# Patient Record
Sex: FEMALE | Race: BLACK OR AFRICAN AMERICAN | Employment: UNEMPLOYED | RURAL
[De-identification: names, ages, dates, MRNs, and addresses within clinical notes are randomized per-mention and may not be internally consistent; named-entity substitution may affect disease eponyms.]

---

## 2017-01-25 RX ORDER — ROPINIROLE 0.5 MG/1
0.5 TABLET, FILM COATED ORAL 2 TIMES DAILY
Qty: 60 TAB | Refills: 0 | Status: CANCELLED | OUTPATIENT
Start: 2017-01-25

## 2017-01-26 RX ORDER — ROPINIROLE 0.5 MG/1
0.5 TABLET, FILM COATED ORAL
Qty: 90 TAB | Status: CANCELLED | OUTPATIENT
Start: 2017-01-26

## 2017-02-03 RX ORDER — DICLOFENAC SODIUM 75 MG/1
TABLET, DELAYED RELEASE ORAL
Qty: 60 TAB | Refills: 0 | Status: SHIPPED | OUTPATIENT
Start: 2017-02-03 | End: 2017-09-14 | Stop reason: SDUPTHER

## 2017-04-25 ENCOUNTER — OFFICE VISIT (OUTPATIENT)
Dept: FAMILY MEDICINE CLINIC | Age: 72
End: 2017-04-25

## 2017-04-25 VITALS
RESPIRATION RATE: 16 BRPM | OXYGEN SATURATION: 97 % | DIASTOLIC BLOOD PRESSURE: 85 MMHG | SYSTOLIC BLOOD PRESSURE: 189 MMHG | BODY MASS INDEX: 32.76 KG/M2 | HEIGHT: 62 IN | WEIGHT: 178 LBS | TEMPERATURE: 97.3 F | HEART RATE: 75 BPM

## 2017-04-25 DIAGNOSIS — Z12.39 BREAST CANCER SCREENING: ICD-10-CM

## 2017-04-25 DIAGNOSIS — E55.9 VITAMIN D DEFICIENCY: ICD-10-CM

## 2017-04-25 DIAGNOSIS — F20.3 UNDIFFERENTIATED SCHIZOPHRENIA (HCC): ICD-10-CM

## 2017-04-25 DIAGNOSIS — I10 ESSENTIAL HYPERTENSION: Primary | ICD-10-CM

## 2017-04-25 DIAGNOSIS — E11.9 DIABETES MELLITUS TYPE 2, DIET-CONTROLLED (HCC): ICD-10-CM

## 2017-04-25 RX ORDER — ACETAMINOPHEN 500 MG
TABLET ORAL
Qty: 90 CAP | Refills: 1 | Status: SHIPPED | OUTPATIENT
Start: 2017-04-25 | End: 2017-05-20 | Stop reason: SDUPTHER

## 2017-04-25 RX ORDER — HYDROCHLOROTHIAZIDE 12.5 MG/1
12.5 TABLET ORAL DAILY
Qty: 90 TAB | Refills: 1 | Status: SHIPPED | OUTPATIENT
Start: 2017-04-25 | End: 2017-05-11 | Stop reason: SDUPTHER

## 2017-04-25 RX ORDER — LISINOPRIL 20 MG/1
TABLET ORAL
Qty: 90 TAB | Refills: 2 | Status: SHIPPED | OUTPATIENT
Start: 2017-04-25 | End: 2017-10-02 | Stop reason: SDUPTHER

## 2017-04-25 NOTE — PROGRESS NOTES
Debi Raya is an 70 y.o. female who presents with chief concern of HTN. HTN:  No check at home. Has been severely elevated at [facility] visits and elevated during visits over the last year since I saw her in June 2016. BP Readings from Last 3 Encounters:   04/25/17 189/85   10/13/16 161/77   08/18/16 184/85   Today, she states she hasn't taken her Lisinopril for 1 week because it was \"making her cold\". This cold and hot sensation has been going on for \"over 3 years\". Not clear what made her think it was Lisinopril. She is not smoking. Not drinking etOH. Eats well rounded diet and avoids fried foods. Denies HA, Vision changes, CP, Palp, SOB, ANDERSON, Edema, dizziness. Schizophrenia:  She goes for her injection next week. Still hearing voices, but no harmful thoughts of hurting self or others. No delusions, no harmful actions. Detailed  Review of Systems, positives are bolded, strike through if denied. GEN:    CV:      Pulm:    Abd/GI:   Psych:    Neuro:     ENT:      Endocrine:   /Temperature intolerance/  :      MSK:      Skin:    Lower Ext:           Current and past medical information:  I personally reviewed and included updated list below.     Past Medical History:   Diagnosis Date    Hypertension     Psychotic disorder        Allergies   Allergen Reactions    Bactrim [Sulfamethoxazole-Trimethoprim] Nausea Only       Past Surgical History:   Procedure Laterality Date    HX GYN         Social History     Social History    Marital status:      Spouse name: N/A    Number of children: N/A    Years of education: N/A     Social History Main Topics    Smoking status: Former Smoker    Smokeless tobacco: Never Used    Alcohol use No    Drug use: No    Sexual activity: Not Asked     Other Topics Concern    None     Social History Narrative           Physical Exam, Abnormal/pertinent findings bolded,     Visit Vitals    /85 (BP 1 Location: Left arm, BP Patient Position: Sitting)    Pulse 75    Temp 97.3 °F (36.3 °C) (Oral)    Resp 16    Ht 5' 2\" (1.575 m)    Wt 178 lb (80.7 kg)    SpO2 97%    BMI 32.56 kg/m2          GEN:    Alert and Oriented, No acute distress  Psych:  Mood appropriate, Strange affect but very pleasant, No pressured speech, linear thoughts  CV:    Regular Rhythm, S1 and S2 audible, no MRG, no palpable thrills  Pulm:    CTA B/L, no wheezes/rubs  GI/Abd:   Non-tender to palpation, normal bowel sounds x4, no masses  Neuro:   Lucid, No focal deficits  ENT:    EOMI, Non-icteric sclera, MMM  Neck:   Trachea midline, no lymphadenopathy  :    No suprapubic tenderness  MSK:  Normal gait, FROM in all four extremities  Skin:   No visible Rash, Ecchymosis, or Excoriations  Lower Ext: No edema, No tenderness to palpation, No palpable cords        Assessment/PLAN    1. Essential hypertension  - Her last few visits her BP has been severely elevated and with her Diet controlled diabetes would still recommend goal <140/90. She has not been taking the Lisinopril which I started in June 2016 for the last week but that does not account for the last year of elevated BP. Will start a low dose second agent (HCTZ) and titrate both. She is not having any cardiopulmonary symptoms.   - Advised follow up in 1 month for repeat BP.   - METABOLIC PANEL, COMPREHENSIVE  - CBC WITH AUTOMATED DIFF  - LIPID PANEL  - HEMOGLOBIN A1C WITH EAG  - MICROALBUMIN, UR, RAND W/ MICROALBUMIN/CREA RATIO  - TSH 3RD GENERATION  - hydroCHLOROthiazide (HYDRODIURIL) 12.5 mg tablet; Take 1 Tab by mouth daily. Dispense: 90 Tab; Refill: 1    2. Diabetes mellitus type 2, diet-controlled ([address]  - Diet controlled. She denies any symptoms of lows. Continue to monitor lab work and discuss vision screening at next visit.    - METABOLIC PANEL, COMPREHENSIVE  - CBC WITH AUTOMATED DIFF  - LIPID PANEL  - HEMOGLOBIN A1C WITH EAG  - MICROALBUMIN, UR, RAND W/ MICROALBUMIN/CREA RATIO  - TSH 3RD GENERATION    3. Undifferentiated schizophrenia ([facility]  - Stable, has follow up next week with [facility].   - METABOLIC PANEL, COMPREHENSIVE  - CBC WITH AUTOMATED DIFF  - LIPID PANEL  - HEMOGLOBIN A1C WITH EAG  - MICROALBUMIN, UR, RAND W/ MICROALBUMIN/CREA RATIO  - TSH 3RD GENERATION    4. Vitamin D deficiency  Lab Results   Component Value Date/Time    VITAMIN D, 25-HYDROXY 7.7 06/20/2016 10:00 AM       Severely low level last year. We attempted to give the 50,000 units weekly but she had a difficult time remembering and level was not rechecked. Will start just a daily supplemental and reassess today as a baseline.   - VITAMIN D, 25 HYDROXY  - Cholecalciferol, Vitamin D3, (VITAMIN D3) 2,000 unit cap capsule; Take 1 tablet daily. Dispense: 90 Cap; Refill: 1    5. Breast cancer screening  - Due for cancer screening.   - DYLAN MAMMO BI SCREENING INCL CAD; Future    Vit D, BP check in 1 month,    Follow-up Disposition:  Return in about 1 month (around 5/25/2017). For next visit follow up [facility], Lab eval, vitamin D, BP check in 1 month. Plan of care:  Discussed diagnoses in detail with patient. Medication risks/benefits/side effects discussed with patient. All of the patient's questions were addressed. The patient understands and agrees with our plan of care. The patient knows to call back if they are unsure of or forget any changes we discussed today or if the symptoms change. The patient received an After-Visit Summary which contains VS, orders, medication list and allergy list. This can be used as a \"mini-medical record\" should they have to seek medical care while out of town.       Maria Eugenia Hoffman DO  Resident note, PGY-3  Patient discussed with [address] [place] [address] Physician, Dr. Dave Suarez  Date Time Provider [place] Jackson   5/23/2017 1:20 PM Maria Eugenia Hoffman DO [facility] SCHED           Current Medications after this visit[de-identified]       Current Outpatient Prescriptions Medication Sig    hydroCHLOROthiazide (HYDRODIURIL) 12.5 mg tablet Take 1 Tab by mouth daily.  Cholecalciferol, Vitamin D3, (VITAMIN D3) 2,000 unit cap capsule Take 1 tablet daily.  lisinopril (PRINIVIL, ZESTRIL) 20 mg tablet TAKE ONE TABLET BY MOUTH EVERY DAY    diclofenac EC (VOLTAREN) 75 mg EC tablet TAKE ONE TABLET BY MOUTH TWICE DAILY AS NEEDED    lurasidone (LATUDA) 80 mg tab tablet 40 mg.    fluPHENAZine (PROLIXIN) 5 mg tablet     fluPHENAZine decanoate (PROLIXIN) 25 mg/mL injection     benztropine (COGENTIN) 0.5 mg tablet      No current facility-administered medications for this visit.

## 2017-04-25 NOTE — MR AVS SNAPSHOT
Visit Information Date & Time Provider Department Dept. Phone Encounter #  
 4/25/2017  8:50 AM Jacqui Durán, [place] [address] [id number] Follow-up Instructions Return in about 3 months (around 7/25/2017). Upcoming Health Maintenance Date Due DTaP/Tdap/Td series (1 - Tdap) 7/7/1966 GLAUCOMA SCREENING Q2Y 7/7/2010 Pneumococcal 65+ Low/Medium Risk (2 of 2 - PCV13) 10/22/2014 EYE EXAM RETINAL OR DILATED Q1 11/12/2014 FOBT Q 1 YEAR AGE 50-75 2/24/2016 MEDICARE YEARLY EXAM 3/18/2016 BREAST CANCER SCRN MAMMOGRAM 4/14/2016 HEMOGLOBIN A1C Q6M 12/20/2016 FOOT EXAM Q1 6/20/2017 MICROALBUMIN Q1 6/20/2017 LIPID PANEL Q1 6/20/2017 Allergies as of 4/25/2017  Review Complete On: 4/25/2017 By: Ricardo Lloyd LPN Severity Noted Reaction Type Reactions Bactrim [Sulfamethoxazole-trimethoprim]  06/27/2013    Nausea Only Current Immunizations  Reviewed on 10/13/2016 Name Date Influenza Vaccine (Quad) PF 10/13/2016, 2/9/2016, 9/25/2014 Influenza Vaccine Split 10/16/2012 Pneumococcal Polysaccharide (PPSV-23) 10/22/2013 Not reviewed this visit You Were Diagnosed With   
  
 Codes Comments Essential hypertension    -  Primary ICD-10-CM: I10 
ICD-9-CM: 401.9 Diabetes mellitus type 2, diet-controlled ([facility]     ICD-10-CM: E11.9 ICD-9-CM: 250.00 Undifferentiated schizophrenia ([facility]     ICD-10-CM: F20.3 ICD-9-CM: 295.90 Vitamin D deficiency     ICD-10-CM: E55.9 ICD-9-CM: 268.9 Breast cancer screening     ICD-10-CM: Z12.39 
ICD-9-CM: V76.10 Vitals BP Pulse Temp Resp Height(growth percentile) Weight(growth percentile) 192/83 (BP 1 Location: Left arm, BP Patient Position: Sitting) 75 97.3 °F (36.3 °C) (Oral) 16 5' 2\" (1.575 m) 178 lb (80.7 kg) SpO2 BMI OB Status Smoking Status 97% 32.56 kg/m2 Postmenopausal Former Smoker Vitals History BMI and BSA Data Body Mass Index Body Surface Area 32.56 kg/m 2 1.88 m 2 Preferred Pharmacy Pharmacy Name Phone [address] [facility] [address] [place] [address] [facility] [phone] Your Updated Medication List  
  
   
This list is accurate as of: 4/25/17  9:36 AM.  Always use your most recent med list.  
  
  
  
  
 benztropine 0.5 mg tablet Commonly known as:  COGENTIN Cholecalciferol (Vitamin D3) 2,000 unit Cap capsule Commonly known as:  VITAMIN D3 Take 1 tablet daily. diclofenac EC 75 mg EC tablet Commonly known as:  VOLTAREN  
TAKE ONE TABLET BY MOUTH TWICE DAILY AS NEEDED  
  
 fluPHENAZine 5 mg tablet Commonly known as:  PROLIXIN  
  
 fluPHENAZine decanoate 25 mg/mL injection Commonly known as:  PROLIXIN  
  
 hydroCHLOROthiazide 12.5 mg tablet Commonly known as:  HYDRODIURIL Take 1 Tab by mouth daily. lisinopril 20 mg tablet Commonly known as:  PRINIVIL, ZESTRIL  
TAKE ONE TABLET BY MOUTH EVERY DAY  
  
 lurasidone 80 mg Tab tablet Commonly known as:  LATUDA  
40 mg.  
  
  
  
  
Prescriptions Sent to Pharmacy Refills  
 hydroCHLOROthiazide (HYDRODIURIL) 12.5 mg tablet 1 Sig: Take 1 Tab by mouth daily. Class: Normal  
 Pharmacy: [address] [place] [address] Ph #: [phone] Route: Oral  
 Cholecalciferol, Vitamin D3, (VITAMIN D3) 2,000 unit cap capsule 1 Sig: Take 1 tablet daily. Class: Normal  
 Pharmacy: [address] [place] [address] Ph #: [phone] We Performed the Following CBC WITH AUTOMATED DIFF [78259 CPT(R)] HEMOGLOBIN A1C WITH EAG [90518 CPT(R)] LIPID PANEL [82976 CPT(R)] METABOLIC PANEL, COMPREHENSIVE [29780 CPT(R)] MICROALBUMIN, UR, RAND W/ MICROALBUMIN/CREA RATIO [id number] CPT(R)] TSH 3RD GENERATION [86033 CPT(R)] VITAMIN D, 25 HYDROXY [id number] CPT(R)] Follow-up Instructions Return in about 3 months (around 7/25/2017). To-Do List   
 04/25/2017 Imaging:  DYLAN MAMMO BI SCREENING INCL CAD Patient Instructions DASH Diet: Care Instructions Your Care Instructions The DASH diet is an eating plan that can help lower your blood pressure. DASH stands for Dietary Approaches to Stop Hypertension. Hypertension is high blood pressure. The DASH diet focuses on eating foods that are high in calcium, potassium, and magnesium. These nutrients can lower blood pressure. The foods that are highest in these nutrients are fruits, vegetables, low-fat dairy products, nuts, seeds, and legumes. But taking calcium, potassium, and magnesium supplements instead of eating foods that are high in those nutrients does not have the same effect. The DASH diet also includes whole grains, fish, and poultry. The DASH diet is one of several lifestyle changes your doctor may recommend to lower your high blood pressure. Your doctor may also want you to decrease the amount of sodium in your diet. Lowering sodium while following the DASH diet can lower blood pressure even further than just the DASH diet alone. Follow-up care is a key part of your treatment and safety. Be sure to make and go to all appointments, and call your doctor if you are having problems. It's also a good idea to know your test results and keep a list of the medicines you take. How can you care for yourself at home? Following the DASH diet · Eat 4 to 5 servings of fruit each day. A serving is 1 medium-sized piece of fruit, ½ cup chopped or canned fruit, 1/4 cup dried fruit, or 4 ounces (½ cup) of fruit juice. Choose fruit more often than fruit juice. · Eat 4 to 5 servings of vegetables each day. A serving is 1 cup of lettuce or raw leafy vegetables, ½ cup of chopped or cooked vegetables, or 4 ounces (½ cup) of vegetable juice. Choose vegetables more often than vegetable juice. · Get 2 to 3 servings of low-fat and fat-free dairy each day. A serving is 8 ounces of milk, 1 cup of yogurt, or 1 ½ ounces of cheese. · Eat 6 to 8 servings of grains each day. A serving is 1 slice of bread, 1 ounce of dry cereal, or ½ cup of cooked rice, pasta, or cooked cereal. Try to choose whole-grain products as much as possible. · Limit lean meat, poultry, and fish to 2 servings each day. A serving is 3 ounces, about the size of a deck of cards. · Eat 4 to 5 servings of nuts, seeds, and legumes (cooked dried beans, lentils, and split peas) each week. A serving is 1/3 cup of nuts, 2 tablespoons of seeds, or ½ cup of cooked beans or peas. · Limit fats and oils to 2 to 3 servings each day. A serving is 1 teaspoon of vegetable oil or 2 tablespoons of salad dressing. · Limit sweets and added sugars to 5 servings or less a week. A serving is 1 tablespoon jelly or jam, ½ cup sorbet, or 1 cup of lemonade. · Eat less than 2,300 milligrams (mg) of sodium a day. If you limit your sodium to 1,500 mg a day, you can lower your blood pressure even more. Tips for success · Start small. Do not try to make dramatic changes to your diet all at once. You might feel that you are missing out on your favorite foods and then be more likely to not follow the plan. Make small changes, and stick with them. Once those changes become habit, add a few more changes. · Try some of the following: ¨ Make it a goal to eat a fruit or vegetable at every meal and at snacks. This will make it easy to get the recommended amount of fruits and vegetables each day. ¨ Try yogurt topped with fruit and nuts for a snack or healthy dessert. ¨ Add lettuce, tomato, cucumber, and onion to sandwiches. ¨ Combine a ready-made pizza crust with low-fat mozzarella cheese and lots of vegetable toppings. Try using tomatoes, squash, spinach, broccoli, carrots, cauliflower, and onions. ¨ Have a variety of cut-up vegetables with a low-fat dip as an appetizer instead of chips and dip. ¨ Sprinkle sunflower seeds or chopped almonds over salads. Or try adding chopped walnuts or almonds to cooked vegetables. ¨ Try some vegetarian meals using beans and peas. Add garbanzo or kidney beans to salads. Make burritos and tacos with mashed godfrey beans or black beans. Where can you learn more? Go to http://florin-kevin.info/. Enter T412 in the search box to learn more about \"DASH Diet: Care Instructions. \" Current as of: March 23, 2016 Content Version: 11.2 © 6332-8224 [employer]. Care instructions adapted under license by [employer] (which disclaims liability or warranty for this information). If you have questions about a medical condition or this instruction, always ask your healthcare professional. [place] [address] any warranty or liability for your use of this information. Introducing [facility]! Soraya Harvey introduces [employer] patient portal. Now you can access parts of your medical record, email your doctor's office, and request medication refills online. 1. In your internet browser, go to [email or web address]. [employer] 2. Click on the First Time User? Click Here link in the Sign In box. You will see the New Member Sign Up page. 3. Enter your [employer] Access Code exactly as it appears below. You will not need to use this code after youve completed the sign-up process. If you do not sign up before the expiration date, you must request a new code. · [employer] Access Code: [email or web address] Expires: 7/24/2017  8:00 AM 
 
4. Enter the last four digits of your Social Security Number (xxxx) and Date of Birth (mm/dd/yyyy) as indicated and click Submit. You will be taken to the next sign-up page. 5. Create a [employer] ID.  This will be your [employer] login ID and cannot be changed, so think of one that is secure and easy to remember. 6. Create a [employer] password. You can change your password at any time. 7. Enter your Password Reset Question and Answer. This can be used at a later time if you forget your password. 8. Enter your e-mail address. You will receive e-mail notification when new information is available in [address] 9. Click Sign Up. You can now view and download portions of your medical record. 10. Click the Download Summary menu link to download a portable copy of your medical information. If you have questions, please visit the Frequently Asked Questions section of the [employer] website. Remember, [employer] is NOT to be used for urgent needs. For medical emergencies, dial 911. Now available from your iPhone and Android! Please provide this summary of care documentation to your next provider. Your primary care clinician is listed as Ronen Guerrero. If you have any questions after today's visit, please call [phone].

## 2017-04-25 NOTE — PROGRESS NOTES
Reviewed record in preparation for visit and have obtained necessary documentation. Patient did not bring medications to visit for review. Information provided on Advanced Directive, Living Will. Body mass index is 32.56 kg/(m^2).    Health Maintenance Due   Topic Date Due    DTaP/Tdap/Td series (1 - Tdap) 07/07/1966    GLAUCOMA SCREENING Q2Y  07/07/2010    Pneumococcal 65+ Low/Medium Risk (2 of 2 - PCV13) 10/22/2014    EYE EXAM RETINAL OR DILATED Q1  11/12/2014    FOBT Q 1 YEAR AGE 50-75  02/24/2016    MEDICARE YEARLY EXAM  03/18/2016    BREAST CANCER SCRN MAMMOGRAM  04/14/2016    HEMOGLOBIN A1C Q6M  12/20/2016

## 2017-04-25 NOTE — PATIENT INSTRUCTIONS

## 2017-04-26 LAB
25(OH)D3+25(OH)D2 SERPL-MCNC: 11.9 NG/ML (ref 30–100)
ALBUMIN SERPL-MCNC: 4.5 G/DL (ref 3.5–4.8)
ALBUMIN/CREAT UR: <22.4 MG/G CREAT (ref 0–30)
ALBUMIN/GLOB SERPL: 1.6 {RATIO} (ref 1.2–2.2)
ALP SERPL-CCNC: 55 IU/L (ref 39–117)
ALT SERPL-CCNC: 7 IU/L (ref 0–32)
AST SERPL-CCNC: 14 IU/L (ref 0–40)
BASOPHILS # BLD AUTO: 0 X10E3/UL (ref 0–0.2)
BASOPHILS NFR BLD AUTO: 0 %
BILIRUB SERPL-MCNC: 0.4 MG/DL (ref 0–1.2)
BUN SERPL-MCNC: 6 MG/DL (ref 8–27)
BUN/CREAT SERPL: 8 (ref 12–28)
CALCIUM SERPL-MCNC: 9.3 MG/DL (ref 8.7–10.3)
CHLORIDE SERPL-SCNC: 100 MMOL/L (ref 96–106)
CHOLEST SERPL-MCNC: 173 MG/DL (ref 100–199)
CO2 SERPL-SCNC: 26 MMOL/L (ref 18–29)
CREAT SERPL-MCNC: 0.73 MG/DL (ref 0.57–1)
CREAT UR-MCNC: 13.4 MG/DL
EOSINOPHIL # BLD AUTO: 0.2 X10E3/UL (ref 0–0.4)
EOSINOPHIL NFR BLD AUTO: 4 %
ERYTHROCYTE [DISTWIDTH] IN BLOOD BY AUTOMATED COUNT: 13.8 % (ref 12.3–15.4)
EST. AVERAGE GLUCOSE BLD GHB EST-MCNC: 120 MG/DL
GLOBULIN SER CALC-MCNC: 2.9 G/DL (ref 1.5–4.5)
GLUCOSE SERPL-MCNC: 101 MG/DL (ref 65–99)
HBA1C MFR BLD: 5.8 % (ref 4.8–5.6)
HCT VFR BLD AUTO: 34.3 % (ref 34–46.6)
HDLC SERPL-MCNC: 72 MG/DL
HGB BLD-MCNC: 11.4 G/DL (ref 11.1–15.9)
IMM GRANULOCYTES # BLD: 0 X10E3/UL (ref 0–0.1)
IMM GRANULOCYTES NFR BLD: 0 %
LDLC SERPL CALC-MCNC: 90 MG/DL (ref 0–99)
LYMPHOCYTES # BLD AUTO: 1.2 X10E3/UL (ref 0.7–3.1)
LYMPHOCYTES NFR BLD AUTO: 28 %
Lab: NORMAL
MCH RBC QN AUTO: 31.3 PG (ref 26.6–33)
MCHC RBC AUTO-ENTMCNC: 33.2 G/DL (ref 31.5–35.7)
MCV RBC AUTO: 94 FL (ref 79–97)
MICROALBUMIN UR-MCNC: <3 UG/ML
MONOCYTES # BLD AUTO: 0.2 X10E3/UL (ref 0.1–0.9)
MONOCYTES NFR BLD AUTO: 5 %
NEUTROPHILS # BLD AUTO: 2.7 X10E3/UL (ref 1.4–7)
NEUTROPHILS NFR BLD AUTO: 63 %
PLATELET # BLD AUTO: 284 X10E3/UL (ref 150–379)
POTASSIUM SERPL-SCNC: 4.1 MMOL/L (ref 3.5–5.2)
PROT SERPL-MCNC: 7.4 G/DL (ref 6–8.5)
RBC # BLD AUTO: 3.64 X10E6/UL (ref 3.77–5.28)
SODIUM SERPL-SCNC: 141 MMOL/L (ref 134–144)
TRIGL SERPL-MCNC: 55 MG/DL (ref 0–149)
TSH SERPL DL<=0.005 MIU/L-ACNC: 2.58 UIU/ML (ref 0.45–4.5)
VLDLC SERPL CALC-MCNC: 11 MG/DL (ref 5–40)
WBC # BLD AUTO: 4.3 X10E3/UL (ref 3.4–10.8)

## 2017-05-04 NOTE — PROGRESS NOTES
Labs OK. Vitamin D still very low. She had difficulty with the weekly dose and will continue the daily. Letter sent.

## 2017-09-11 ENCOUNTER — OFFICE VISIT (OUTPATIENT)
Dept: FAMILY MEDICINE CLINIC | Age: 72
End: 2017-09-11

## 2017-09-11 VITALS
BODY MASS INDEX: 35.15 KG/M2 | TEMPERATURE: 98.3 F | RESPIRATION RATE: 18 BRPM | HEIGHT: 62 IN | OXYGEN SATURATION: 98 % | WEIGHT: 191 LBS | HEART RATE: 78 BPM | DIASTOLIC BLOOD PRESSURE: 83 MMHG | SYSTOLIC BLOOD PRESSURE: 180 MMHG

## 2017-09-11 DIAGNOSIS — G89.29 CHRONIC PAIN OF LEFT KNEE: Primary | ICD-10-CM

## 2017-09-11 DIAGNOSIS — M25.562 CHRONIC PAIN OF LEFT KNEE: Primary | ICD-10-CM

## 2017-09-11 DIAGNOSIS — F20.9 SCHIZOPHRENIA, UNSPECIFIED TYPE (HCC): ICD-10-CM

## 2017-09-11 RX ORDER — HYDROXYZINE 25 MG/1
TABLET, FILM COATED ORAL
COMMUNITY
End: 2018-01-24 | Stop reason: SDUPTHER

## 2017-09-11 RX ORDER — ARIPIPRAZOLE 15 MG/1
15 TABLET ORAL DAILY
COMMUNITY
End: 2018-01-24 | Stop reason: SDUPTHER

## 2017-09-11 RX ORDER — OLANZAPINE 10 MG/1
10 TABLET ORAL
COMMUNITY
End: 2018-09-21 | Stop reason: ALTCHOICE

## 2017-09-11 RX ORDER — LIDOCAINE HYDROCHLORIDE 10 MG/ML
1 INJECTION INFILTRATION; PERINEURAL ONCE
Qty: 1 VIAL | Refills: 0
Start: 2017-09-11 | End: 2017-09-11

## 2017-09-11 RX ORDER — TRIAMCINOLONE ACETONIDE 40 MG/ML
40 INJECTION, SUSPENSION INTRA-ARTICULAR; INTRAMUSCULAR ONCE
Qty: 1 ML | Refills: 0
Start: 2017-09-11 | End: 2017-09-11

## 2017-09-11 NOTE — PROGRESS NOTES
Progress Note    Patient: Justine Leiva MRN: [id number]  SSN: [id number]    YOB: 1945  Age: 67 y.o. Sex: female        Chief Complaint   Patient presents with    Knee Pain     L knee         Subjective:     Encounter Diagnoses   Name Primary?  Chronic pain of left knee Yes    Schizophrenia, unspecified type ([facility]        Left knee pain  Patient presenting with left knee pain x 1month. This is a chronic issue for which the patient has received treatment in the past including \"pain pills\" (possibliy diclofenac), tylenol and steroid injection. Reports that pain pill and tylenol do not work but is interested in getting a needle. Denies recent trauma or falls. Current and past medical information:    Current Medications after this visit[de-identified]    Current Outpatient Prescriptions   Medication Sig    hydrOXYzine HCl (ATARAX) 25 mg tablet Take  by mouth two (2) times daily as needed for Itching.  OLANZapine (ZYPREXA) 10 mg tablet Take 10 mg by mouth nightly.  ARIPiprazole (ABILIFY) 15 mg tablet Take 15 mg by mouth daily.  triamcinolone acetonide (KENALOG) 40 mg/mL injection 1 mL by IntraMUSCular route once for 1 dose.  lidocaine (XYLOCAINE) 10 mg/mL (1 %) injection 1 mL by IntraDERMal route once for 1 dose.  VITAMIN D3 2,000 unit cap capsule TAKE ONE CAPSULE BY MOUTH EVERY DAY    lisinopril (PRINIVIL, ZESTRIL) 20 mg tablet TAKE ONE TABLET BY MOUTH EVERY DAY    diclofenac EC (VOLTAREN) 75 mg EC tablet TAKE ONE TABLET BY MOUTH TWICE DAILY AS NEEDED    lurasidone (LATUDA) 80 mg tab tablet 40 mg.    fluPHENAZine (PROLIXIN) 5 mg tablet     benztropine (COGENTIN) 0.5 mg tablet     hydroCHLOROthiazide (HYDRODIURIL) 12.5 mg tablet TAKE ONE TABLET BY MOUTH EVERY DAY    fluPHENAZine decanoate (PROLIXIN) 25 mg/mL injection      No current facility-administered medications for this visit.         Patient Active Problem List    Diagnosis Date Noted    Tardive dyskinesia 11/28/2014    Schizophrenia ([facility] 11/28/2014    Schizoaffective disorder ([facility] 11/28/2014    Psychosis 11/28/2014    Knee pain 07/26/2012    Diabetes mellitus type 2, diet-controlled ([facility] 07/25/2012    HTN (hypertension) 07/09/2012    Hypokalemia 07/09/2012    MR (mental retardation) 07/09/2012       Past Medical History:   Diagnosis Date    Hypertension     Psychotic disorder        Allergies   Allergen Reactions    Bactrim [Sulfamethoxazole-Trimethoprim] Nausea Only       Past Surgical History:   Procedure Laterality Date    HX GYN         Social History     Social History    Marital status:      Spouse name: N/A    Number of children: N/A    Years of education: N/A     Social History Main Topics    Smoking status: Former Smoker    Smokeless tobacco: Never Used    Alcohol use No    Drug use: No    Sexual activity: Not Asked     Other Topics Concern    None     Social History Narrative       {Review of Systems   Musculoskeletal: Positive for joint pain. Negative for back pain, falls and myalgias. Neurological: Negative for dizziness, tingling, tremors and headaches. Objective:     Vitals:    09/11/17 0759 09/11/17 0828   BP: 180/84 180/83   Pulse: 78    Resp: 18    Temp: 98.3 °F (36.8 °C)    TempSrc: Oral    SpO2: 98%    Weight: 191 lb (86.6 kg)    Height: 5' 2\" (1.575 m)       Body mass index is 34.93 kg/(m^2).       Physical Exam   Musculoskeletal:  Knee: left  Knee Effusion: None  Quadriceps atrophy: None   Popliteal (Bakers) Cyst: Negative   Patellofemoral crepitus: Negative  Q angle:     ROM:  Flexion: 130  Extension: 0   Hip IR/ER: FROM without pain    Alignment:  Foot:   Patellar tracking:     Dynamic Test:  Gait: Normal   Assistive devices: None    Palpation:   Patella tenderness: None  Patellar tendon tenderness: None  Quad tendon tenderness: None  Medial joint line tenderness: None  Lateral joint line tenderness: None  MCL tenderness: None  LCL tenderness: None  Medial facet tenderness: None  Lateral facet tenderness: None  Condyle tenderness: None  Tibia tubercle tenderness: None  Proximal fibula tenderness: None  Plica tenderness: None  Prepatellar bursa tenderness: None  Pes bursa tenderness: None  ITB tenderness: None    Ligament/Meniscal Exam:  Patellar Grind: Negative   Patellar apprehension (medial/lateral): Negative   Lochman: Negative, with good endpoint   Anterior Drawer: Negative   Posterior Drawer: Negative   Valgus stress: Negative with good endpoint   Varus stress: Negative with good endpoint   Dial test: Negative   Nirav: Negative   Shira sign: Negative. Strength (0-5/5):   Flexion: Left: 5/5    Right: 5/5    Extension: Left: 5/5    Right: 5/5    Hip abduction: 5/5    Hip adduction: 5/5            Health Maintenance Due   Topic Date Due    DTaP/Tdap/Td series (1 - Tdap) 07/07/1966    GLAUCOMA SCREENING Q2Y  07/07/2010    Pneumococcal 65+ Low/Medium Risk (2 of 2 - PCV13) 10/22/2014    EYE EXAM RETINAL OR DILATED Q1  11/12/2014    FOBT Q 1 YEAR AGE 50-75  02/24/2016    BREAST CANCER SCRN MAMMOGRAM  04/14/2016    FOOT EXAM Q1  06/20/2017    INFLUENZA AGE 9 TO ADULT  08/01/2017       Assessment and orders:     Encounter Diagnoses     ICD-10-CM ICD-9-CM   1. Chronic pain of left knee M25.562 719.46    G89.29 338.29   2. Schizophrenia, unspecified type ([facility] F20.9 295.90       1. Chronic pain of left knee  - triamcinolone acetonide (KENALOG) 40 mg/mL injection; 1 mL by IntraMUSCular route once for 1 dose. Dispense: 1 mL; Refill: 0  - lidocaine (XYLOCAINE) 10 mg/mL (1 %) injection; 1 mL by IntraDERMal route once for 1 dose. Dispense: 1 Vial; Refill: 0  - PA DRAIN/INJECT LARGE JOINT/BURSA  - 20610    2. Schizophrenia, unspecified type ([facility]  - hydrOXYzine HCl (ATARAX) 25 mg tablet; Take  by mouth two (2) times daily as needed for Itching.  - OLANZapine (ZYPREXA) 10 mg tablet; Take 10 mg by mouth nightly. - ARIPiprazole (ABILIFY) 15 mg tablet;  Take 15 mg by mouth daily.        Plan of care:  Discussed diagnoses in detail with patient. Medication risks/benefits/side effects discussed with patient. All of the patient's questions were addressed. The patient understands and agrees with our plan of care. The patient knows to call back if they are unsure of or forget any changes we discussed today or if the symptoms change. The patient received an After-Visit Summary which contains VS, orders, medication list and allergy list. This can be used as a \"mini-medical record\" should they have to seek medical care while out of town. Follow-up Disposition:  Return in about 2 weeks (around 9/25/2017) for HTN, please schedule with residents. .    No future appointments.     Signed By: Jesse Connolly MD     September 11, 2017

## 2017-09-11 NOTE — PROGRESS NOTES
[facility]  OFFICE PROCEDURE PROGRESS NOTE        Chart reviewed for the following:   Jann Dillard MD, have reviewed the History, Physical and updated the Allergic reactions for [address] performed immediately prior to start of procedure:   Jann Dillard MD, have performed the following reviews on Chanelle Aaron prior to the start of the procedure:            * Patient was identified by name and date of birth   * Agreement on procedure being performed was verified  * Risks and Benefits explained to the patient  * Procedure site verified and marked as necessary  * Patient was positioned for comfort  * Consent was signed and verified     Time: 8:25      Date of procedure: 9/11/2017    Procedure performed by:  Osvaldo Lainez MD    Provider assisted by: Ailyn Oliver LPN    Patient assisted by: self    How tolerated by patient: tolerated the procedure well with no complications    Post Procedural Pain Scale: 0 - No Hurt    Comments: none          Procedure note:  After consent was obtained and a timeout to verify accuracy the left knee joint was prepped with alcohol and 3 swabs of betadine. Using sterile no-touch technique the joint space was entered and no blood returned. One cc of 2% plain xylocaine, 1 cc of marcaine and 1 cc of Kenalog (40 mg) were mixed and injected. The procedure was well tolerated and the patient got immediate relief from pain. It was explained that pain may actually increase the day after the injection until the steroid takes effect. The patient knows to rest the joint for 2 days and call immediately if fever, redness or swelling of the joint occurs. Joint injections improve symptoms most but not all of the time.  Call back if symptoms persist.

## 2017-09-11 NOTE — PATIENT INSTRUCTIONS
Joint Injections: Care Instructions  Your Care Instructions  Joint injections are shots into a joint, such as the knee. They may be used to put in medicines, such as pain relievers. Or they can be used to take out fluid. Sometimes the fluid is tested in a lab. This can help find the cause of a joint problem. A corticosteroid, or steroid, shot is used to reduce inflammation in tendons or joints. It is often used to treat problems such as arthritis, tendinitis, and bursitis. Steroids can be injected directly into a painful, inflamed joint. They can also help reduce inflammation of a bursa. A bursa is a sac of fluid. It cushions and lubricates areas where tendons, ligaments, skin, muscles, or bones rub against each other. A steroid shot can sometimes help with short-term pain relief when other treatments haven't worked. If steroid shots help, pain may improve for weeks or months. Follow-up care is a key part of your treatment and safety. Be sure to make and go to all appointments, and call your doctor if you are having problems. It's also a good idea to know your test results and keep a list of the medicines you take. How can you care for yourself at home? · Put ice or a cold pack on the area for 10 to 20 minutes at a time. Put a thin cloth between the ice and your skin. · Take anti-inflammatory medicines to reduce pain, swelling, or inflammation. These include ibuprofen (Advil, Motrin) and naproxen (Aleve). Read and follow all instructions on the label. · Avoid strenuous activities for several days, especially those that put stress on the area where you got the shot. · If you have dressings over the area, keep them clean and dry. You may remove them when your doctor tells you to. When should you call for help? Call your doctor now or seek immediate medical care if:  · You have signs of infection, such as:  ¨ Increased pain, swelling, warmth, or redness. ¨ Red streaks leading from the site.   ¨ Pus draining from the site. ¨ A fever. Watch closely for changes in your health, and be sure to contact your doctor if you have any problems. Where can you learn more? Go to http://florin-kevin.info/. Enter N616 in the search box to learn more about \"Joint Injections: Care Instructions. \"  Current as of: March 21, 2017  Content Version: 11.3  © 1459-9966 [employer]. Care instructions adapted under license by [employer] (which disclaims liability or warranty for this information). If you have questions about a medical condition or this instruction, always ask your healthcare professional. [address] any warranty or liability for your use of this information.

## 2017-09-11 NOTE — PROGRESS NOTES
Reviewed record in preparation for visit and have necessary documentation  Opportunity was given for questions  Goals that were addressed and/or need to be completed after this appointment include   Health Maintenance Due   Topic Date Due    DTaP/Tdap/Td series (1 - Tdap) 07/07/1966    GLAUCOMA SCREENING Q2Y  07/07/2010    Pneumococcal 65+ Low/Medium Risk (2 of 2 - PCV13) 10/22/2014    EYE EXAM RETINAL OR DILATED Q1  11/12/2014    FOBT Q 1 YEAR AGE 50-75  02/24/2016    BREAST CANCER SCRN MAMMOGRAM  04/14/2016    FOOT EXAM Q1  06/20/2017    INFLUENZA AGE 9 TO ADULT  08/01/2017

## 2017-09-11 NOTE — MR AVS SNAPSHOT
Visit Information Date & Time Provider Department Dept. Phone Encounter #  
 9/11/2017  8:00 AM Alvaro Hathaway MD [facility] [place] [id number] Follow-up Instructions Return in about 2 weeks (around 9/25/2017) for HTN, please schedule with residents. Ariesraymondalba Deng Upcoming Health Maintenance Date Due DTaP/Tdap/Td series (1 - Tdap) 7/7/1966 GLAUCOMA SCREENING Q2Y 7/7/2010 Pneumococcal 65+ Low/Medium Risk (2 of 2 - PCV13) 10/22/2014 EYE EXAM RETINAL OR DILATED Q1 11/12/2014 FOBT Q 1 YEAR AGE 50-75 2/24/2016 BREAST CANCER SCRN MAMMOGRAM 4/14/2016 FOOT EXAM Q1 6/20/2017 INFLUENZA AGE 9 TO ADULT 8/1/2017 MEDICARE YEARLY EXAM 6/2/2018* HEMOGLOBIN A1C Q6M 10/25/2017 MICROALBUMIN Q1 4/25/2018 LIPID PANEL Q1 4/25/2018 *Topic was postponed. The date shown is not the original due date. Allergies as of 9/11/2017  Review Complete On: 9/11/2017 By: Alvaro Hathaway MD  
  
 Severity Noted Reaction Type Reactions Bactrim [Sulfamethoxazole-trimethoprim]  06/27/2013    Nausea Only Current Immunizations  Reviewed on 10/13/2016 Name Date Influenza Vaccine (Quad) PF 10/13/2016, 2/9/2016, 9/25/2014 Influenza Vaccine Split 10/16/2012 Pneumococcal Polysaccharide (PPSV-23) 10/22/2013 Not reviewed this visit You Were Diagnosed With   
  
 Codes Comments Chronic pain of left knee    -  Primary ICD-10-CM: M25.562, P72.56 ICD-9-CM: 719.46, 338.29 Schizophrenia, unspecified type ([facility]     ICD-10-CM: F20.9 ICD-9-CM: 295.90 Vitals BP Pulse Temp Resp Height(growth percentile) Weight(growth percentile) 180/83 78 98.3 °F (36.8 °C) (Oral) 18 5' 2\" (1.575 m) 191 lb (86.6 kg) SpO2 BMI OB Status Smoking Status 98% 34.93 kg/m2 Postmenopausal Former Smoker Vitals History BMI and BSA Data Body Mass Index Body Surface Area  34.93 kg/m 2 1.95 m 2  
  
  
 Preferred Pharmacy Pharmacy Name Phone [address] [facility] [place] [facility] [phone] Your Updated Medication List  
  
   
This list is accurate as of: 9/11/17  8:33 AM.  Always use your most recent med list.  
  
  
  
  
 ARIPiprazole 15 mg tablet Commonly known as:  ABILIFY Take 15 mg by mouth daily. benztropine 0.5 mg tablet Commonly known as:  COGENTIN  
  
 diclofenac EC 75 mg EC tablet Commonly known as:  VOLTAREN  
TAKE ONE TABLET BY MOUTH TWICE DAILY AS NEEDED  
  
 fluPHENAZine 5 mg tablet Commonly known as:  PROLIXIN  
  
 fluPHENAZine decanoate 25 mg/mL injection Commonly known as:  PROLIXIN  
  
 hydroCHLOROthiazide 12.5 mg tablet Commonly known as:  HYDRODIURIL  
TAKE ONE TABLET BY MOUTH EVERY DAY  
  
 hydrOXYzine HCl 25 mg tablet Commonly known as:  ATARAX Take  by mouth two (2) times daily as needed for Itching. lidocaine 10 mg/mL (1 %) injection Commonly known as:  XYLOCAINE  
1 mL by IntraDERMal route once for 1 dose. lisinopril 20 mg tablet Commonly known as:  PRINIVIL, ZESTRIL  
TAKE ONE TABLET BY MOUTH EVERY DAY  
  
 lurasidone 80 mg Tab tablet Commonly known as:  LATUDA  
40 mg. OLANZapine 10 mg tablet Commonly known as:  ZyPREXA Take 10 mg by mouth nightly. triamcinolone acetonide 40 mg/mL injection Commonly known as:  KENALOG  
1 mL by IntraMUSCular route once for 1 dose. VITAMIN D3 2,000 unit Cap capsule Generic drug:  Cholecalciferol (Vitamin D3) TAKE ONE CAPSULE BY MOUTH EVERY DAY We Performed the Following VT DRAIN/INJECT LARGE JOINT/BURSA [id number] CPT(R)] Follow-up Instructions Return in about 2 weeks (around 9/25/2017) for HTN, please schedule with residents. .  
  
  
Patient Instructions Joint Injections: Care Instructions Your Care Instructions Joint injections are shots into a joint, such as the knee.  They may be used to put in medicines, such as pain relievers. Or they can be used to take out fluid. Sometimes the fluid is tested in a lab. This can help find the cause of a joint problem. A corticosteroid, or steroid, shot is used to reduce inflammation in tendons or joints. It is often used to treat problems such as arthritis, tendinitis, and bursitis. Steroids can be injected directly into a painful, inflamed joint. They can also help reduce inflammation of a bursa. A bursa is a sac of fluid. It cushions and lubricates areas where tendons, ligaments, skin, muscles, or bones rub against each other. A steroid shot can sometimes help with short-term pain relief when other treatments haven't worked. If steroid shots help, pain may improve for weeks or months. Follow-up care is a key part of your treatment and safety. Be sure to make and go to all appointments, and call your doctor if you are having problems. It's also a good idea to know your test results and keep a list of the medicines you take. How can you care for yourself at home? · Put ice or a cold pack on the area for 10 to 20 minutes at a time. Put a thin cloth between the ice and your skin. · Take anti-inflammatory medicines to reduce pain, swelling, or inflammation. These include ibuprofen (Advil, Motrin) and naproxen (Aleve). Read and follow all instructions on the label. · Avoid strenuous activities for several days, especially those that put stress on the area where you got the shot. · If you have dressings over the area, keep them clean and dry. You may remove them when your doctor tells you to. When should you call for help? Call your doctor now or seek immediate medical care if: 
· You have signs of infection, such as: 
¨ Increased pain, swelling, warmth, or redness. ¨ Red streaks leading from the site. ¨ Pus draining from the site. ¨ A fever. Watch closely for changes in your health, and be sure to contact your doctor if you have any problems. Where can you learn more? Go to http://florin-kevin.info/. Enter N616 in the search box to learn more about \"Joint Injections: Care Instructions. \" Current as of: March 21, 2017 Content Version: 11.3 © 9958-6868 [employer]. Care instructions adapted under license by [employer] (which disclaims liability or warranty for this information). If you have questions about a medical condition or this instruction, always ask your healthcare professional. [address] any warranty or liability for your use of this information. Introducing [facility]! Karan Tadeo introduces [employer] patient portal. Now you can access parts of your medical record, email your doctor's office, and request medication refills online. 1. In your internet browser, go to [email or web address]. [employer]/[facility] [employer] 2. Click on the First Time User? Click Here link in the Sign In box. You will see the New Member Sign Up page. 3. Enter your [employer] Access Code exactly as it appears below. You will not need to use this code after youve completed the sign-up process. If you do not sign up before the expiration date, you must request a new code. · [employer] Access Code: [email or web address] Expires: 12/10/2017  8:33 AM 
 
4. Enter the last four digits of your Social Security Number (xxxx) and Date of Birth (mm/dd/yyyy) as indicated and click Submit. You will be taken to the next sign-up page. 5. Create a [employer] ID. This will be your [employer] login ID and cannot be changed, so think of one that is secure and easy to remember. 6. Create a [employer] password. You can change your password at any time. 7. Enter your Password Reset Question and Answer. This can be used at a later time if you forget your password. 8. Enter your e-mail address. You will receive e-mail notification when new information is available in [address] 9. Click Sign Up. You can now view and download portions of your medical record. 10. Click the Download Summary menu link to download a portable copy of your medical information. If you have questions, please visit the Frequently Asked Questions section of the [facility] website. Remember, [facility] is NOT to be used for urgent needs. For medical emergencies, dial 911. Now available from your iPhone and Android! Please provide this summary of care documentation to your next provider. If you have any questions after today's visit, please call [phone].

## 2017-09-14 RX ORDER — DICLOFENAC SODIUM 75 MG/1
TABLET, DELAYED RELEASE ORAL
Qty: 60 TAB | Refills: 0 | Status: SHIPPED | OUTPATIENT
Start: 2017-09-14 | End: 2018-01-24 | Stop reason: SDUPTHER

## 2017-09-26 DIAGNOSIS — E55.9 VITAMIN D DEFICIENCY: ICD-10-CM

## 2017-09-27 RX ORDER — NICOTINE 11MG/24HR
PATCH, TRANSDERMAL 24 HOURS TRANSDERMAL
Qty: 30 CAP | Refills: 3 | Status: SHIPPED | OUTPATIENT
Start: 2017-09-27 | End: 2018-01-24 | Stop reason: SDUPTHER

## 2017-10-02 ENCOUNTER — OFFICE VISIT (OUTPATIENT)
Dept: FAMILY MEDICINE CLINIC | Age: 72
End: 2017-10-02

## 2017-10-02 VITALS
HEIGHT: 62 IN | HEART RATE: 92 BPM | RESPIRATION RATE: 16 BRPM | SYSTOLIC BLOOD PRESSURE: 145 MMHG | OXYGEN SATURATION: 98 % | DIASTOLIC BLOOD PRESSURE: 72 MMHG | WEIGHT: 192.8 LBS | BODY MASS INDEX: 35.48 KG/M2 | TEMPERATURE: 97.8 F

## 2017-10-02 DIAGNOSIS — I10 ESSENTIAL HYPERTENSION: Primary | ICD-10-CM

## 2017-10-02 DIAGNOSIS — Z12.11 SCREENING FOR MALIGNANT NEOPLASM OF COLON: ICD-10-CM

## 2017-10-02 DIAGNOSIS — E11.9 DIABETES MELLITUS TYPE 2, DIET-CONTROLLED (HCC): ICD-10-CM

## 2017-10-02 DIAGNOSIS — Z23 ENCOUNTER FOR IMMUNIZATION: ICD-10-CM

## 2017-10-02 DIAGNOSIS — Z12.39 ENCOUNTER FOR OTHER SCREENING FOR MALIGNANT NEOPLASM OF BREAST: ICD-10-CM

## 2017-10-02 RX ORDER — HYDROCHLOROTHIAZIDE 12.5 MG/1
12.5 TABLET ORAL DAILY
Qty: 90 TAB | Refills: 2 | Status: SHIPPED | OUTPATIENT
Start: 2017-10-02 | End: 2017-11-25 | Stop reason: SDUPTHER

## 2017-10-02 RX ORDER — LISINOPRIL 20 MG/1
20 TABLET ORAL DAILY
Qty: 90 TAB | Refills: 2 | Status: SHIPPED | OUTPATIENT
Start: 2017-10-02 | End: 2018-01-24 | Stop reason: SDUPTHER

## 2017-10-02 NOTE — PROGRESS NOTES
Chief Complaint   Patient presents with    Medication Refill    Hypertension    Other     \"I feel weak in my stomach. \"     Body mass index is 35.26 kg/(m^2).     Reviewed record in preparation for visit and have necessary documentation  Pt did not bring medication to office visit for review  Information was given to pt on Advanced Directives, Living Will  Information was given on Shingles Vaccine  Opportunity was given for questions  Goals that were addressed and/or need to be completed after this appointment include:     Health Maintenance Due   Topic Date Due    DTaP/Tdap/Td series (1 - Tdap) 07/07/1966    GLAUCOMA SCREENING Q2Y  07/07/2010    Pneumococcal 65+ Low/Medium Risk (2 of 2 - PCV13) 10/22/2014    EYE EXAM RETINAL OR DILATED Q1  11/12/2014    FOBT Q 1 YEAR AGE 50-75  02/24/2016    BREAST CANCER SCRN MAMMOGRAM  04/14/2016    FOOT EXAM Q1  06/20/2017    INFLUENZA AGE 9 TO ADULT  08/01/2017    HEMOGLOBIN A1C Q6M  10/25/2017

## 2017-10-02 NOTE — PATIENT INSTRUCTIONS

## 2017-10-02 NOTE — MR AVS SNAPSHOT
Visit Information Date & Time Provider Department Dept. Phone Encounter #  
 10/2/2017  9:10 AM Erika Ferrell MD [address] [facility] [id number] Follow-up Instructions Return in about 3 months (around 1/2/2018) for Routine. Gómez Callaway Upcoming Health Maintenance Date Due DTaP/Tdap/Td series (1 - Tdap) 7/7/1966 GLAUCOMA SCREENING Q2Y 7/7/2010 Pneumococcal 65+ Low/Medium Risk (2 of 2 - PCV13) 10/22/2014 EYE EXAM RETINAL OR DILATED Q1 11/12/2014 FOBT Q 1 YEAR AGE 50-75 2/24/2016 BREAST CANCER SCRN MAMMOGRAM 4/14/2016 FOOT EXAM Q1 6/20/2017 INFLUENZA AGE 9 TO ADULT 8/1/2017 HEMOGLOBIN A1C Q6M 10/25/2017 MEDICARE YEARLY EXAM 6/2/2018* MICROALBUMIN Q1 4/25/2018 LIPID PANEL Q1 4/25/2018 *Topic was postponed. The date shown is not the original due date. Allergies as of 10/2/2017  Review Complete On: 10/2/2017 By: Erika Ferrell MD  
  
 Severity Noted Reaction Type Reactions Bactrim [Sulfamethoxazole-trimethoprim]  06/27/2013    Nausea Only Current Immunizations  Reviewed on 10/13/2016 Name Date Influenza High Dose Vaccine PF  Incomplete Influenza Vaccine (Quad) PF 10/13/2016, 2/9/2016, 9/25/2014 Influenza Vaccine Split 10/16/2012 Pneumococcal Polysaccharide (PPSV-23) 10/22/2013 Not reviewed this visit You Were Diagnosed With   
  
 Codes Comments Essential hypertension    -  Primary ICD-10-CM: I10 
ICD-9-CM: 401.9 Diabetes mellitus type 2, [facility]     ICD-10-CM: E11.9 ICD-9-CM: 250.00 Encounter for immunization     ICD-10-CM: O71 ICD-9-CM: V03.89 Screening for malignant neoplasm of colon     ICD-10-CM: Z12.11 ICD-9-CM: V76.51 Encounter for other screening for malignant neoplasm of breast     ICD-10-CM: Z12.39 
ICD-9-CM: V76.19 Vitals BP Pulse Temp Resp Height(growth percentile) Weight(growth percentile) 145/72 (BP 1 Location: Left arm, BP Patient Position: Sitting) 92 97.8 °F (36.6 °C) (Oral) 16 5' 2\" (1.575 m) 192 lb 12.8 oz (87.5 kg) SpO2 BMI OB Status Smoking Status 98% 35.26 kg/m2 Postmenopausal Former Smoker Vitals History BMI and BSA Data Body Mass Index Body Surface Area  
 35.26 kg/m 2 1.96 m 2 Preferred Pharmacy Pharmacy Name Phone [address] [place], [place] - [address] [phone] Your Updated Medication List  
  
   
This list is accurate as of: 10/2/17  9:17 AM.  Always use your most recent med list.  
  
  
  
  
 ARIPiprazole 15 mg tablet Commonly known as:  ABILIFY Take 15 mg by mouth daily. benztropine 0.5 mg tablet Commonly known as:  COGENTIN  
  
 diclofenac EC 75 mg EC tablet Commonly known as:  VOLTAREN  
TAKE ONE TABLET BY MOUTH TWICE DAILY AS NEEDED  
  
 fluPHENAZine 5 mg tablet Commonly known as:  PROLIXIN  
  
 fluPHENAZine decanoate 25 mg/mL injection Commonly known as:  PROLIXIN  
  
 hydroCHLOROthiazide 12.5 mg tablet Commonly known as:  HYDRODIURIL Take 1 Tab by mouth daily. hydrOXYzine HCl 25 mg tablet Commonly known as:  ATARAX Take  by mouth two (2) times daily as needed for Itching. lisinopril 20 mg tablet Commonly known as:  Joceline Ezra Take 1 Tab by mouth daily. lurasidone 80 mg Tab tablet Commonly known as:  LATUDA  
40 mg. OLANZapine 10 mg tablet Commonly known as:  ZyPREXA Take 10 mg by mouth nightly. pneumococcal 13 phoenix conj dip 0.5 mL Syrg injection Commonly known as:  PREVNAR-13  
0.5 mL by IntraMUSCular route once for 1 dose. VITAMIN D3 2,000 unit Cap capsule Generic drug:  Cholecalciferol (Vitamin D3) TAKE ONE CAPSULE BY MOUTH EVERY DAY Prescriptions Sent to Pharmacy Refills  
 hydroCHLOROthiazide (HYDRODIURIL) 12.5 mg tablet 2 Sig: Take 1 Tab by mouth daily.   
 Class: Normal  
 Pharmacy: [address] [facility] [place], [address] [facility] #: [phone] Route: Oral  
 lisinopril (PRINIVIL, ZESTRIL) 20 mg tablet 2 Sig: Take 1 Tab by mouth daily. Class: Normal  
 Pharmacy: [address] [place] [address] Ph #: [phone] Route: Oral  
 pneumococcal 13 phoenix conj dip (PREVNAR-13) 0.5 mL syrg injection 0 Si.5 mL by IntraMUSCular route once for 1 dose. Class: Normal  
 Pharmacy: [address] [place] [address] Ph #: [phone] Route: IntraMUSCular We Performed the Following ADMIN INFLUENZA VIRUS VAC [ HCP] AMB POC FUNDUS PHOTOGRAPHY WITH INTERP AND REPORT [93576 CPT(R)] HEMOGLOBIN A1C WITH EAG [44639 CPT(R)] INFLUENZA VIRUS VACCINE, HIGH DOSE SEASONAL, PRESERVATIVE FREE [56157 CPT(R)] LIPID PANEL [94475 CPT(R)] METABOLIC PANEL, BASIC [53988 CPT(R)] OCCULT BLOOD, IMMUNOASSAY (FIT) V956040 CPT(R)] Follow-up Instructions Return in about 3 months (around 2018) for Routine. Connie Echeverria To-Do List   
 10/02/2017 Imaging:  DYLAN MAMMO BI SCREENING INCL CAD Patient Instructions Influenza (Flu) Vaccine (Inactivated or Recombinant): What You Need to Know Why get vaccinated? Influenza (\"flu\") is a contagious disease that spreads around the [place] every winter, usually between October and May. Flu is caused by influenza viruses and is spread mainly by coughing, sneezing, and close contact. Anyone can get flu. Flu strikes suddenly and can last several days. Symptoms vary by age, but can include: · Fever/chills. · Sore throat. · Muscle aches. · Fatigue. · Cough. · Headache. · Runny or stuffy nose. Flu can also lead to pneumonia and blood infections, and cause diarrhea and seizures in children. If you have a medical condition, such as heart or lung disease, flu can make it worse. Flu is more dangerous for some people. Infants and young children, people 72years of age and older, pregnant women, and people with certain health conditions or a weakened immune system are at greatest risk. Each year thousands of people in the [facility] die from flu, and many more are hospitalized. Flu vaccine can: · Keep you from getting flu. · Make flu less severe if you do get it. · Keep you from spreading flu to your family and other people. Inactivated and recombinant flu vaccines A dose of flu vaccine is recommended every flu season. Children 6 months through 6years of age may need two doses during the same flu season. Everyone else needs only one dose each flu season. Some inactivated flu vaccines contain a very small amount of a mercury-based preservative called thimerosal. Studies have not shown thimerosal in vaccines to be harmful, but flu vaccines that do not contain thimerosal are available. There is no live flu virus in flu shots. They cannot cause the flu. There are many flu viruses, and they are always changing. Each year a new flu vaccine is made to protect against three or four viruses that are likely to cause disease in the upcoming flu season. But even when the vaccine doesn't exactly match these viruses, it may still provide some protection. Flu vaccine cannot prevent: · Flu that is caused by a virus not covered by the vaccine. · Illnesses that look like flu but are not. Some people should not get this vaccine Tell the person who is giving you the vaccine: · If you have any severe (life-threatening) allergies. If you ever had a life-threatening allergic reaction after a dose of flu vaccine, or have a severe allergy to any part of this vaccine, you may be advised not to get vaccinated. Most, but not all, types of flu vaccine contain a small amount of egg protein.  
· If you ever had Guillain-Barré syndrome (also called GBS) Some people with a history of GBS should not get this vaccine. This should be discussed with your doctor. · If you are not feeling well. It is usually okay to get flu vaccine when you have a mild illness, but you might be asked to come back when you feel better. Risks of a vaccine reaction With any medicine, including vaccines, there is a chance of reactions. These are usually mild and go away on their own, but serious reactions are also possible. Most people who get a flu shot do not have any problems with it. Minor problems following a flu shot include: · Soreness, redness, or swelling where the shot was given · Hoarseness · Sore, red or itchy eyes · Cough · Fever · Aches · Headache · Itching · Fatigue If these problems occur, they usually begin soon after the shot and last 1 or 2 days. More serious problems following a flu shot can include the following: · There may be a small increased risk of Guillain-Barré Syndrome (GBS) after inactivated flu vaccine. This risk has been estimated at 1 or 2 additional cases per million people vaccinated. This is much lower than the risk of severe complications from flu, which can be prevented by flu vaccine. · Partida Riff children who get the flu shot along with pneumococcal vaccine (PCV13) and/or DTaP vaccine at the same time might be slightly more likely to have a seizure caused by fever. Ask your doctor for more information. Tell your doctor if a child who is getting flu vaccine has ever had a seizure Problems that could happen after any injected vaccine: · People sometimes faint after a medical procedure, including vaccination. Sitting or lying down for about 15 minutes can help prevent fainting, and injuries caused by a fall. Tell your doctor if you feel dizzy, or have vision changes or ringing in the ears. · Some people get severe pain in the shoulder and have difficulty moving the arm where a shot was given. This happens very rarely. · Any medication can cause a severe allergic reaction. Such reactions from a vaccine are very rare, estimated at about 1 in a million doses, and would happen within a few minutes to a few hours after the vaccination. As with any medicine, there is a very remote chance of a vaccine causing a serious injury or death. The safety of vaccines is always being monitored. For more information, visit: www.cdc.gov/vaccinesafety/. What if there is a serious reaction? What should I look for? · Look for anything that concerns you, such as signs of a severe allergic reaction, very high fever, or unusual behavior. Signs of a severe allergic reaction can include hives, swelling of the face and throat, difficulty breathing, a fast heartbeat, dizziness, and weakness  usually within a few minutes to a few hours after the vaccination. What should I do? · If you think it is a severe allergic reaction or other emergency that can't wait, call 9-1-1 and get the person to the nearest hospital. Otherwise, call your doctor. · Reactions should be reported to the \"Vaccine Adverse Event Reporting System\" (VAERS). Your doctor should file this report, or you can do it yourself through the VAERS website at www.vaers. [facility].gov, or by calling 7-106.116.5425. [employer] does not give medical advice. The National Vaccine Injury Compensation Program 
The National Vaccine Injury Compensation Program (VICP) is a federal program that was created to compensate people who may have been injured by certain vaccines. Persons who believe they may have been injured by a vaccine can learn about the program and about filing a claim by calling 0-181.685.7502 or visiting the [employer] website at www.[facility].gov/vaccinecompensation. There is a time limit to file a claim for compensation. How can I learn more? · Ask your healthcare provider. He or she can give you the vaccine package insert or suggest other sources of information. · Call your local or state health department. · Contact the Centers for Disease Control and Prevention (CDC): 
¨ Call 1-471.369.5702 (1-800-CDC-INFO) or ¨ Visit CDC's website at www.cdc.gov/flu Vaccine Information Statement Inactivated Influenza Vaccine 8/7/2015) 42 GUSTAVO Coker 964RU-40 [facility] and [place] Centers for Disease Control and Prevention Many Vaccine Information Statements are available in [place] and other languages. See www.immunize.org/vis. Muchas hojas de información sobre vacunas están disponibles en español y en otros idiomas. Visite www.immunize.org/vis. Care instructions adapted under license by [employer] (which disclaims liability or warranty for this information). If you have questions about a medical condition or this instruction, always ask your healthcare professional. [address] any warranty or liability for your use of this information. Introducing [facility]! Sung Barkley introduces [employer] patient portal. Now you can access parts of your medical record, email your doctor's office, and request medication refills online. 1. In your internet browser, go to [email or web address] 2. Click on the First Time User? Click Here link in the Sign In box. You will see the New Member Sign Up page. 3. Enter your [employer] Access Code exactly as it appears below. You will not need to use this code after youve completed the sign-up process. If you do not sign up before the expiration date, you must request a new code. · [employer] Access Code: [email or web address] Expires: 12/10/2017  8:33 AM 
 
4. Enter the last four digits of your Social Security Number (xxxx) and Date of Birth (mm/dd/yyyy) as indicated and click Submit. You will be taken to the next sign-up page. 5. Create a OBOOKt ID. This will be your [employer] login ID and cannot be changed, so think of one that is secure and easy to remember. 6. Create a [employer] password. You can change your password at any time. 7. Enter your Password Reset Question and Answer. This can be used at a later time if you forget your password. 8. Enter your e-mail address. You will receive e-mail notification when new information is available in [address] 9. Click Sign Up. You can now view and download portions of your medical record. 10. Click the Download Summary menu link to download a portable copy of your medical information. If you have questions, please visit the Frequently Asked Questions section of the [employer] website. Remember, [employer] is NOT to be used for urgent needs. For medical emergencies, dial 911. Now available from your iPhone and Android! Please provide this summary of care documentation to your next provider. If you have any questions after today's visit, please call [phone].

## 2017-10-02 NOTE — PROGRESS NOTES
Jorge Pete  67 y.o. female  1945  [address] [facility]  [id number]     [facility]: Progress Note       Encounter Date: 10/2/2017    Chief Complaint   Patient presents with    Medication Refill    Hypertension    Other     \"I feel weak in my stomach. \"     History of Present Illness   Jorge Pete is a 67 y.o. female who presents to clinic today for:    Hypertension: Improved   BP Readings from Last 3 Encounters:   10/02/17 145/72   09/11/17 180/83   04/25/17 189/85     The patient reports: NOT taking medications as instructed, no medication side effects noted, no TIA's, no chest pain on exertion, no dyspnea on exertion, no swelling of ankles. Lab Results   Component Value Date/Time    Sodium 141 04/25/2017 09:53 AM    Potassium 4.1 04/25/2017 09:53 AM    Chloride 100 04/25/2017 09:53 AM    CO2 26 04/25/2017 09:53 AM    Glucose 101 04/25/2017 09:53 AM    BUN 6 04/25/2017 09:53 AM    Creatinine 0.73 04/25/2017 09:53 AM    BUN/Creatinine ratio 8 04/25/2017 09:53 AM    GFR est AA 96 04/25/2017 09:53 AM    GFR est non-AA 83 04/25/2017 09:53 AM    Calcium 9.3 04/25/2017 09:53 AM    Bilirubin, total 0.4 04/25/2017 09:53 AM    AST (SGOT) 14 04/25/2017 09:53 AM    Alk. phosphatase 55 04/25/2017 09:53 AM    Protein, total 7.4 04/25/2017 09:53 AM    Albumin 4.5 04/25/2017 09:53 AM    A-G Ratio 1.6 04/25/2017 09:53 AM    ALT (SGPT) 7 04/25/2017 09:53 AM     Our goal is to normalize the blood pressure to decrease the risks of strokes and heart attacks. The patient is in agreement with the plan. Diabetes Follow up: Controlled   Overall the patient feels well with good energy level.    Diabetic Consultants:Endocrinologist - N/A   Insulin dependence: NO   Pertinent Labs:   Lab Results   Component Value Date/Time    Hemoglobin A1c 5.8 04/25/2017 09:53 AM        Lab Results   Component Value Date/Time    Microalb/Creat ratio (ug/mg creat.) <22.4 04/25/2017 09:53 AM      Body mass index is 35.26 kg/(m^2). Lab Results   Component Value Date/Time    LDL, calculated 90 04/25/2017 09:53 AM      Wt Readings from Last 3 Encounters:   10/02/17 192 lb 12.8 oz (87.5 kg)   09/11/17 191 lb (86.6 kg)   04/25/17 178 lb (80.7 kg)        History   Smoking Status    Former Smoker   Smokeless Tobacco    Never Used     Questions regarding medications, diet and exercise were answered. Goal of A1C of less than 6.5% discussed. Diabetic foot care was discussed. Cold Symptoms  Patient reports that she is feeling weak in the stomach as well as a HA. Denies fever, chills, N/V/D. Associated with nasal congestion. Has not taken any medications OTC for the symptoms. Health Maintenance  Will do influenza, Eye exam and ordered mammo and FIT today. Health Maintenance Due   Topic Date Due    DTaP/Tdap/Td series (1 - Tdap) 07/07/1966    GLAUCOMA SCREENING Q2Y  07/07/2010    Pneumococcal 65+ Low/Medium Risk (2 of 2 - PCV13) 10/22/2014    EYE EXAM RETINAL OR DILATED Q1  11/12/2014    FOBT Q 1 YEAR AGE 50-75  02/24/2016    BREAST CANCER SCRN MAMMOGRAM  04/14/2016    FOOT EXAM Q1  06/20/2017    INFLUENZA AGE 9 TO ADULT  08/01/2017    HEMOGLOBIN A1C Q6M  10/25/2017     Review of Systems   Review of Systems   Constitutional: Positive for malaise/fatigue. Negative for diaphoresis and fever. HENT: Positive for congestion. Negative for ear pain, sinus pain and sore throat. Eyes: Negative for blurred vision, double vision, photophobia and pain. Respiratory: Negative for cough, sputum production, shortness of breath and wheezing. Cardiovascular: Negative for chest pain, palpitations and leg swelling. Skin: Negative for itching and rash. Neurological: Positive for headaches. Negative for dizziness, tingling, seizures, loss of consciousness and weakness.        Vitals/Objective:     Vitals:    10/02/17 0853   BP: 145/72   Pulse: 92   Resp: 16   Temp: 97.8 °F (36.6 °C)   TempSrc: Oral   SpO2: 98% Weight: 192 lb 12.8 oz (87.5 kg)   Height: 5' 2\" (1.575 m)     Body mass index is 35.26 kg/(m^2). Physical Exam    No results found for this or any previous visit (from the past 24 hour(s)). Assessment and Plan:     Encounter Diagnoses     ICD-10-CM ICD-9-CM   1. Essential hypertension I10 401.9   2. Diabetes mellitus type 2, diet-controlled (HCC) E11.9 250.00   3. Encounter for immunization Z23 V03.89   4. Screening for malignant neoplasm of colon Z12.11 V76.51   5. Encounter for other screening for malignant neoplasm of breast Z12.39 V76.19       1. Essential hypertension  Despite not having medication for several weeks, BP is best that it has been in this office. Will resume medication at this time. - hydroCHLOROthiazide (HYDRODIURIL) 12.5 mg tablet; Take 1 Tab by mouth daily. Dispense: 90 Tab; Refill: 2  - lisinopril (PRINIVIL, ZESTRIL) 20 mg tablet; Take 1 Tab by mouth daily. Dispense: 90 Tab; Refill: 2  - METABOLIC PANEL, BASIC    2. Diabetes mellitus type 2, diet-controlled (HCC)  - LIPID PANEL  - HEMOGLOBIN A1C WITH EAG  - AMB POC FUNDUS PHOTOGRAPHY WITH INTERP AND REPORT    3. Encounter for immunization  - INFLUENZA VIRUS VACCINE, HIGH DOSE SEASONAL, PRESERVATIVE FREE  - ADMIN INFLUENZA VIRUS VAC  - pneumococcal 13 phoenix conj dip (PREVNAR-13) 0.5 mL syrg injection; 0.5 mL by IntraMUSCular route once for 1 dose. Dispense: 0.5 mL; Refill: 0    4. Screening for malignant neoplasm of colon  - OCCULT BLOOD, IMMUNOASSAY (FIT)    5. Encounter for other screening for malignant neoplasm of breast  - DYLAN MAMMO BI SCREENING INCL CAD; Future      I have discussed the diagnosis with the patient and the intended plan as seen in the above orders. she has expressed understanding. The patient has received an after-visit summary and questions were answered concerning future plans. I have discussed medication side effects and warnings with the patient as well.     Electronically Signed: Nancy Singleton MD History/Allergies   Patients past medical, surgical and family histories were reviewed and updated. Past Medical History:   Diagnosis Date    Hypertension     Psychotic disorder       Past Surgical History:   Procedure Laterality Date    HX GYN       History reviewed. No pertinent family history. Social History     Social History    Marital status:      Spouse name: N/A    Number of children: N/A    Years of education: N/A     Occupational History    Not on file. Social History Main Topics    Smoking status: Former Smoker    Smokeless tobacco: Never Used    Alcohol use No    Drug use: No    Sexual activity: Not on file     Other Topics Concern    Not on file     Social History Narrative         Allergies   Allergen Reactions    Bactrim [Sulfamethoxazole-Trimethoprim] Nausea Only       Disposition     Follow-up Disposition:  Return in about 3 months (around 1/2/2018) for Routine. .    No future appointments. Current Medications after this visit     Current Outpatient Prescriptions   Medication Sig    hydroCHLOROthiazide (HYDRODIURIL) 12.5 mg tablet Take 1 Tab by mouth daily.  lisinopril (PRINIVIL, ZESTRIL) 20 mg tablet Take 1 Tab by mouth daily.  pneumococcal 13 phoenix conj dip (PREVNAR-13) 0.5 mL syrg injection 0.5 mL by IntraMUSCular route once for 1 dose.  VITAMIN D3 2,000 unit cap capsule TAKE ONE CAPSULE BY MOUTH EVERY DAY    hydrOXYzine HCl (ATARAX) 25 mg tablet Take  by mouth two (2) times daily as needed for Itching.  OLANZapine (ZYPREXA) 10 mg tablet Take 10 mg by mouth nightly.  ARIPiprazole (ABILIFY) 15 mg tablet Take 15 mg by mouth daily.     lurasidone (LATUDA) 80 mg tab tablet 40 mg.    fluPHENAZine (PROLIXIN) 5 mg tablet     benztropine (COGENTIN) 0.5 mg tablet     diclofenac EC (VOLTAREN) 75 mg EC tablet TAKE ONE TABLET BY MOUTH TWICE DAILY AS NEEDED    fluPHENAZine decanoate (PROLIXIN) 25 mg/mL injection      No current facility-administered medications for this visit.       Medications Discontinued During This Encounter   Medication Reason    hydroCHLOROthiazide (HYDRODIURIL) 12.5 mg tablet Reorder    lisinopril (PRINIVIL, ZESTRIL) 20 mg tablet Reorder

## 2017-10-03 LAB
BUN SERPL-MCNC: 8 MG/DL (ref 8–27)
BUN/CREAT SERPL: 11 (ref 12–28)
CALCIUM SERPL-MCNC: 9.2 MG/DL (ref 8.7–10.3)
CHLORIDE SERPL-SCNC: 101 MMOL/L (ref 96–106)
CHOLEST SERPL-MCNC: 175 MG/DL (ref 100–199)
CO2 SERPL-SCNC: 26 MMOL/L (ref 18–29)
CREAT SERPL-MCNC: 0.73 MG/DL (ref 0.57–1)
EST. AVERAGE GLUCOSE BLD GHB EST-MCNC: 105 MG/DL
GLUCOSE SERPL-MCNC: 94 MG/DL (ref 65–99)
HBA1C MFR BLD: 5.3 % (ref 4.8–5.6)
HDLC SERPL-MCNC: 65 MG/DL
LDLC SERPL CALC-MCNC: 96 MG/DL (ref 0–99)
POTASSIUM SERPL-SCNC: 4.3 MMOL/L (ref 3.5–5.2)
SODIUM SERPL-SCNC: 140 MMOL/L (ref 134–144)
TRIGL SERPL-MCNC: 72 MG/DL (ref 0–149)
VLDLC SERPL CALC-MCNC: 14 MG/DL (ref 5–40)

## 2017-11-25 DIAGNOSIS — I10 ESSENTIAL HYPERTENSION: ICD-10-CM

## 2017-11-27 RX ORDER — HYDROCHLOROTHIAZIDE 12.5 MG/1
TABLET ORAL
Qty: 30 TAB | Refills: 0 | Status: SHIPPED | OUTPATIENT
Start: 2017-11-27 | End: 2018-01-24 | Stop reason: SDUPTHER

## 2018-01-24 ENCOUNTER — OFFICE VISIT (OUTPATIENT)
Dept: FAMILY MEDICINE CLINIC | Age: 73
End: 2018-01-24

## 2018-01-24 VITALS
BODY MASS INDEX: 35.88 KG/M2 | DIASTOLIC BLOOD PRESSURE: 71 MMHG | RESPIRATION RATE: 18 BRPM | OXYGEN SATURATION: 100 % | SYSTOLIC BLOOD PRESSURE: 137 MMHG | HEIGHT: 62 IN | HEART RATE: 82 BPM | TEMPERATURE: 98.1 F | WEIGHT: 195 LBS

## 2018-01-24 DIAGNOSIS — E11.9 DIABETES MELLITUS TYPE 2, DIET-CONTROLLED (HCC): Primary | ICD-10-CM

## 2018-01-24 DIAGNOSIS — I10 ESSENTIAL HYPERTENSION: ICD-10-CM

## 2018-01-24 DIAGNOSIS — Z12.11 SCREENING FOR MALIGNANT NEOPLASM OF COLON: ICD-10-CM

## 2018-01-24 DIAGNOSIS — E55.9 VITAMIN D DEFICIENCY: ICD-10-CM

## 2018-01-24 DIAGNOSIS — F20.3 UNDIFFERENTIATED SCHIZOPHRENIA (HCC): ICD-10-CM

## 2018-01-24 DIAGNOSIS — G24.01 TARDIVE DYSKINESIA: ICD-10-CM

## 2018-01-24 RX ORDER — HYDROXYZINE 25 MG/1
25 TABLET, FILM COATED ORAL 2 TIMES DAILY
COMMUNITY
Start: 2018-01-24 | End: 2018-06-11

## 2018-01-24 RX ORDER — ACETAMINOPHEN 500 MG
2000 TABLET ORAL DAILY
Qty: 90 CAP | Refills: 3 | Status: SHIPPED | OUTPATIENT
Start: 2018-01-24 | End: 2018-10-08 | Stop reason: SDUPTHER

## 2018-01-24 RX ORDER — HYDROCHLOROTHIAZIDE 12.5 MG/1
25 TABLET ORAL DAILY
Qty: 30 TAB | Refills: 0 | Status: SHIPPED | OUTPATIENT
Start: 2018-01-24 | End: 2018-06-11 | Stop reason: SINTOL

## 2018-01-24 RX ORDER — LURASIDONE HYDROCHLORIDE 80 MG/1
40 TABLET, FILM COATED ORAL 2 TIMES DAILY
Qty: 30 TAB | Refills: 0 | COMMUNITY
Start: 2018-01-24

## 2018-01-24 RX ORDER — LISINOPRIL 20 MG/1
20 TABLET ORAL DAILY
Qty: 90 TAB | Refills: 2 | Status: SHIPPED | OUTPATIENT
Start: 2018-01-24 | End: 2018-04-18 | Stop reason: SDUPTHER

## 2018-01-24 RX ORDER — ARIPIPRAZOLE 15 MG/1
15 TABLET ORAL DAILY
COMMUNITY
Start: 2018-01-24 | End: 2018-09-21 | Stop reason: ALTCHOICE

## 2018-01-24 RX ORDER — DICLOFENAC SODIUM 75 MG/1
TABLET, DELAYED RELEASE ORAL
Qty: 60 TAB | Refills: 0 | Status: SHIPPED | OUTPATIENT
Start: 2018-01-24 | End: 2018-03-23 | Stop reason: SDUPTHER

## 2018-01-24 RX ORDER — BENZTROPINE MESYLATE 0.5 MG/1
0.5 TABLET ORAL 2 TIMES DAILY
COMMUNITY
Start: 2018-01-24 | End: 2018-09-21 | Stop reason: ALTCHOICE

## 2018-01-24 NOTE — MR AVS SNAPSHOT
Tylor Burroughs 
 
 
 [address] [facility] [address] [address] 
[phone] Patient: Mike Renee MRN: [id number] LNX:[id number] Visit Information Date & Time Provider Department Dept. Phone Encounter #  
 1/24/2018  9:10 AM Marina Marques MD [address] [facility] [id number] Follow-up Instructions Return in about 4 months (around 5/24/2018) for Routine. .  
  
Your Appointments 1/24/2018  9:10 AM  
ROUTINE CARE with Marina Marques MD  
[facility] ([facility]) Appt Note: 3 mo f/u-HTN,Diabetes, Due for eye exam; 01/03/18 AOC; 3 mo f/u-HTN,Diabetes, Due for eye exam  
 [address] [facility] [address] [address]  
[place] [address] [address] Upcoming Health Maintenance Date Due DTaP/Tdap/Td series (1 - Tdap) 7/7/1966 GLAUCOMA SCREENING Q2Y 7/7/2010 EYE EXAM RETINAL OR DILATED Q1 11/12/2014 FOBT Q 1 YEAR AGE 50-75 2/24/2016 BREAST CANCER SCRN MAMMOGRAM 4/14/2016 FOOT EXAM Q1 6/20/2017 MEDICARE YEARLY EXAM 6/2/2018* HEMOGLOBIN A1C Q6M 4/2/2018 MICROALBUMIN Q1 4/25/2018 LIPID PANEL Q1 10/2/2018 *Topic was postponed. The date shown is not the original due date. Allergies as of 1/24/2018  Review Complete On: 1/24/2018 By: Maria Fernanda Anand LPN Severity Noted Reaction Type Reactions Bactrim [Sulfamethoxazole-trimethoprim]  06/27/2013    Nausea Only Current Immunizations  Reviewed on 10/13/2016 Name Date Influenza High Dose Vaccine PF 10/2/2017 Influenza Vaccine (Quad) PF 10/13/2016, 2/9/2016, 9/25/2014 Influenza Vaccine Split 10/16/2012 Pneumococcal Conjugate (PCV-13) 10/24/2017 Pneumococcal Polysaccharide (PPSV-23) 10/22/2013 Not reviewed this visit You Were Diagnosed With   
  
 Codes Comments Diabetes mellitus type 2, diet-controlled ([facility]    -  Primary ICD-10-CM: E11.9 ICD-9-CM: 250.00 Essential hypertension     ICD-10-CM: I10 
ICD-9-CM: 401.9 Vitamin D deficiency     ICD-10-CM: E55.9 ICD-9-CM: 268.9 Undifferentiated schizophrenia ([facility]     ICD-10-CM: F20.3 ICD-9-CM: 295.90 Tardive dyskinesia     ICD-10-CM: G24.01 
ICD-9-CM: 333.85 Hypokalemia     ICD-10-CM: E87.6 ICD-9-CM: 276.8 Screening for malignant neoplasm of colon     ICD-10-CM: Z12.11 ICD-9-CM: V76.51 Schizoaffective disorder, depressive type ([facility]     ICD-10-CM: F25.1 ICD-9-CM: 295.70 Schizophrenia, unspecified type ([facility]     ICD-10-CM: F20.9 ICD-9-CM: 295.90 Vitals BP Pulse Temp Resp Height(growth percentile) Weight(growth percentile) 154/74 82 98.1 °F (36.7 °C) (Oral) 18 5' 2\" (1.575 m) 195 lb (88.5 kg) SpO2 BMI OB Status Smoking Status 100% 35.67 kg/m2 Postmenopausal Former Smoker Vitals History BMI and BSA Data Body Mass Index Body Surface Area  
 35.67 kg/m 2 1.97 m 2 Preferred Pharmacy Pharmacy Name Phone [address] [place], [place] - [address] [phone] Your Updated Medication List  
  
   
This list is accurate as of: 1/24/18  8:52 AM.  Always use your most recent med list.  
  
  
  
  
 ARIPiprazole 15 mg tablet Commonly known as:  ABILIFY Take 1 Tab by mouth daily. benztropine 0.5 mg tablet Commonly known as:  COGENTIN Take 1 Tab by mouth two (2) times a day. Cholecalciferol (Vitamin D3) 2,000 unit Cap capsule Commonly known as:  VITAMIN D3 Take 2,000 Units by mouth daily. Indications: Vitamin D Deficiency  
  
 diclofenac EC 75 mg EC tablet Commonly known as:  VOLTAREN  
TAKE ONE TABLET BY MOUTH TWICE DAILY AS NEEDED  
  
 fluPHENAZine 5 mg tablet Commonly known as:  PROLIXIN  
  
 fluPHENAZine decanoate 25 mg/mL injection Commonly known as:  PROLIXIN  
  
 hydroCHLOROthiazide 12.5 mg tablet Commonly known as:  HYDRODIURIL Take 2 Tabs by mouth daily. hydrOXYzine HCl 25 mg tablet Commonly known as:  ATARAX Take 1 Tab by mouth two (2) times a day. LATUDA 80 mg Tab tablet Generic drug:  lurasidone Take 1 Tab by mouth two (2) times a day. lisinopril 20 mg tablet Commonly known as:  Ashley Cantu Take 1 Tab by mouth daily. OLANZapine 10 mg tablet Commonly known as:  ZyPREXA Take 10 mg by mouth nightly. Prescriptions Sent to Pharmacy Refills  
 lisinopril (PRINIVIL, ZESTRIL) 20 mg tablet 2 Sig: Take 1 Tab by mouth daily. Class: Normal  
 Pharmacy: [address] [place] [address] Ph #: [phone] Route: Oral  
 hydroCHLOROthiazide (HYDRODIURIL) 12.5 mg tablet 0 Sig: Take 2 Tabs by mouth daily. Class: Normal  
 Pharmacy: [address] [place] [address] Ph #: [phone] Route: Oral  
 diclofenac EC (VOLTAREN) 75 mg EC tablet 0 Sig: TAKE ONE TABLET BY MOUTH TWICE DAILY AS NEEDED Class: Normal  
 Pharmacy: [address] [place] [address] Ph #: [phone] Cholecalciferol, Vitamin D3, (VITAMIN D3) 2,000 unit cap capsule 3 Sig: Take 2,000 Units by mouth daily. Indications: Vitamin D Deficiency Class: Normal  
 Pharmacy: [address] [place] [address] Ph #: [phone] Route: Oral  
  
We Performed the Following FUNDUS PHOTOGRAPHY C9280845 CPT(R)] HEMOGLOBIN A1C WITH EAG [19647 CPT(R)]  DIABETES FOOT EXAM [HM7 Custom] LIPID PANEL [29157 CPT(R)] METABOLIC PANEL, BASIC [24580 CPT(R)] OCCULT BLOOD, IMMUNOASSAY (FIT) P1792302 CPT(R)] VITAMIN D, 25 HYDROXY [id number] CPT(R)] Follow-up Instructions Return in about 4 months (around 5/24/2018) for Routine. .  
  
  
Patient Instructions Body Mass Index: Care Instructions Your Care Instructions Body mass index (BMI) can help you see if your weight is raising your risk for health problems. It uses a formula to compare how much you weigh with how tall you are. · A BMI lower than 18.5 is considered underweight. · A BMI between 18.5 and 24.9 is considered healthy. · A BMI between 25 and 29.9 is considered overweight. A BMI of 30 or higher is considered obese. If your BMI is in the normal range, it means that you have a lower risk for weight-related health problems. If your BMI is in the overweight or obese range, you may be at increased risk for weight-related health problems, such as high blood pressure, heart disease, stroke, arthritis or joint pain, and diabetes. If your BMI is in the underweight range, you may be at increased risk for health problems such as fatigue, lower protection (immunity) against illness, muscle loss, bone loss, hair loss, and hormone problems. BMI is just one measure of your risk for weight-related health problems. You may be at higher risk for health problems if you are not active, you eat an unhealthy diet, or you drink too much alcohol or use tobacco products. Follow-up care is a key part of your treatment and safety. Be sure to make and go to all appointments, and call your doctor if you are having problems. It's also a good idea to know your test results and keep a list of the medicines you take. How can you care for yourself at home? · Practice healthy eating habits. This includes eating plenty of fruits, vegetables, whole grains, lean protein, and low-fat dairy. · If your doctor recommends it, get more exercise. Walking is a good choice. Bit by bit, increase the amount you walk every day. Try for at least 30 minutes on most days of the week. · Do not smoke. Smoking can increase your risk for health problems.  If you need help quitting, talk to your doctor about stop-smoking programs and medicines. These can increase your chances of quitting for good. · Limit alcohol to 2 drinks a day for men and 1 drink a day for women. Too much alcohol can cause health problems. If you have a BMI higher than 25 · Your doctor may do other tests to check your risk for weight-related health problems. This may include measuring the distance around your waist. A waist measurement of more than 40 inches in men or 35 inches in women can increase the risk of weight-related health problems. · Talk with your doctor about steps you can take to stay healthy or improve your health. You may need to make lifestyle changes to lose weight and stay healthy, such as changing your diet and getting regular exercise. If you have a BMI lower than 18.5 · Your doctor may do other tests to check your risk for health problems. · Talk with your doctor about steps you can take to stay healthy or improve your health. You may need to make lifestyle changes to gain or maintain weight and stay healthy, such as getting more healthy foods in your diet and doing exercises to build muscle. Where can you learn more? Go to http://florin-kevin.info/. Enter S176 in the search box to learn more about \"Body Mass Index: Care Instructions. \" Current as of: October 13, 2016 Content Version: 11.4 © 5791-3848 Healthwise, [employer]. Care instructions adapted under license by [employer] (which disclaims liability or warranty for this information). If you have questions about a medical condition or this instruction, always ask your healthcare professional. [address] any warranty or liability for your use of this information. Learning About Diabetes Food Guidelines Your Care Instructions Meal planning is important to manage diabetes. It helps keep your blood sugar at a target level (which you set with your doctor).  You don't have to eat special foods. You can eat what your family eats, including sweets once in a while. But you do have to pay attention to how often you eat and how much you eat of certain foods. You may want to work with a dietitian or a certified diabetes educator (CDE) to help you plan meals and snacks. A dietitian or CDE can also help you lose weight if that is one of your goals. What should you know about eating carbs? Managing the amount of carbohydrate (carbs) you eat is an important part of healthy meals when you have diabetes. Carbohydrate is found in many foods. · Learn which foods have carbs. And learn the amounts of carbs in different foods. ¨ Bread, cereal, pasta, and rice have about 15 grams of carbs in a serving. A serving is 1 slice of bread (1 ounce), ½ cup of cooked cereal, or 1/3 cup of cooked pasta or rice. ¨ Fruits have 15 grams of carbs in a serving. A serving is 1 small fresh fruit, such as an apple or orange; ½ of a banana; ½ cup of cooked or canned fruit; ½ cup of fruit juice; 1 cup of melon or raspberries; or 2 tablespoons of dried fruit. ¨ Milk and no-sugar-added yogurt have 15 grams of carbs in a serving. A serving is 1 cup of milk or 2/3 cup of no-sugar-added yogurt. ¨ Starchy vegetables have 15 grams of carbs in a serving. A serving is ½ cup of mashed potatoes or sweet potato; 1 cup winter squash; ½ of a small baked potato; ½ cup of cooked beans; or ½ cup cooked corn or green peas. · Learn how much carbs to eat each day and at each meal. A dietitian or CDE can teach you how to keep track of the amount of carbs you eat. This is called carbohydrate counting. · If you are not sure how to count carbohydrate grams, use the Plate Method to plan meals. It is a good, quick way to make sure that you have a balanced meal. It also helps you spread carbs throughout the day. ¨ Divide your plate by types of foods.  Put non-starchy vegetables on half the plate, meat or other protein food on one-quarter of the plate, and a grain or starchy vegetable in the final quarter of the plate. To this you can add a small piece of fruit and 1 cup of milk or yogurt, depending on how many carbs you are supposed to eat at a meal. 
· Try to eat about the same amount of carbs at each meal. Do not \"save up\" your daily allowance of carbs to eat at one meal. 
· Proteins have very little or no carbs per serving. Examples of proteins are beef, chicken, turkey, fish, eggs, tofu, cheese, cottage cheese, and peanut butter. A serving size of meat is 3 ounces, which is about the size of a deck of cards. Examples of meat substitute serving sizes (equal to 1 ounce of meat) are 1/4 cup of cottage cheese, 1 egg, 1 tablespoon of peanut butter, and ½ cup of tofu. How can you eat out and still eat healthy? · Learn to estimate the serving sizes of foods that have carbohydrate. If you measure food at home, it will be easier to estimate the amount in a serving of restaurant food. · If the meal you order has too much carbohydrate (such as potatoes, corn, or baked beans), ask to have a low-carbohydrate food instead. Ask for a salad or green vegetables. · If you use insulin, check your blood sugar before and after eating out to help you plan how much to eat in the future. · If you eat more carbohydrate at a meal than you had planned, take a walk or do other exercise. This will help lower your blood sugar. What else should you know? · Limit saturated fat, such as the fat from meat and dairy products. This is a healthy choice because people who have diabetes are at higher risk of heart disease. So choose lean cuts of meat and nonfat or low-fat dairy products. Use olive or canola oil instead of butter or shortening when cooking. · Don't skip meals. Your blood sugar may drop too low if you skip meals and take insulin or certain medicines for diabetes. · Check with your doctor before you drink alcohol. Alcohol can cause your blood sugar to drop too low. Alcohol can also cause a bad reaction if you take certain diabetes medicines. Follow-up care is a key part of your treatment and safety. Be sure to make and go to all appointments, and call your doctor if you are having problems. It's also a good idea to know your test results and keep a list of the medicines you take. Where can you learn more? Go to http://florin-kevin.info/. Enter F765 in the search box to learn more about \"Learning About Diabetes Food Guidelines. \" Current as of: March 13, 2017 Content Version: 11.4 © 8234-6788 [employer]. Care instructions adapted under license by [employer] (which disclaims liability or warranty for this information). If you have questions about a medical condition or this instruction, always ask your healthcare professional. [place] [address] any warranty or liability for your use of this information. Introducing [facility]! [address] introduces [facility] patient portal. Now you can access parts of your medical record, email your doctor's office, and request medication refills online. 1. In your internet browser, go to [email or web address] 2. Click on the First Time User? Click Here link in the Sign In box. You will see the New Member Sign Up page. 3. Enter your [facility] Access Code exactly as it appears below. You will not need to use this code after youve completed the sign-up process. If you do not sign up before the expiration date, you must request a new code. · [facility] Access Code: [email or web address] Expires: 4/24/2018  8:52 AM 
 
4. Enter the last four digits of your Social Security Number (xxxx) and Date of Birth (mm/dd/yyyy) as indicated and click Submit. You will be taken to the next sign-up page. 5. Create a MedAptus ID. This will be your MedA[employer]us login ID and cannot be changed, so think of one that is secure and easy to remember. 6. Create a MedA[employer]us password. You can change your password at any time. 7. Enter your Password Reset Question and Answer. This can be used at a later time if you forget your password. 8. Enter your e-mail address. You will receive e-mail notification when new information is available in [address] 9. Click Sign Up. You can now view and download portions of your medical record. 10. Click the Download Summary menu link to download a portable copy of your medical information. If you have questions, please visit the Frequently Asked Questions section of the MedAptus website. Remember, MedAptus is NOT to be used for urgent needs. For medical emergencies, dial 911. Now available from your iPhone and Android! Please provide this summary of care documentation to your next provider. Your primary care clinician is listed as Trini Nesbitt. If you have any questions after today's visit, please call [phone].

## 2018-01-24 NOTE — PATIENT INSTRUCTIONS
Body Mass Index: Care Instructions  Your Care Instructions    Body mass index (BMI) can help you see if your weight is raising your risk for health problems. It uses a formula to compare how much you weigh with how tall you are. · A BMI lower than 18.5 is considered underweight. · A BMI between 18.5 and 24.9 is considered healthy. · A BMI between 25 and 29.9 is considered overweight. A BMI of 30 or higher is considered obese. If your BMI is in the normal range, it means that you have a lower risk for weight-related health problems. If your BMI is in the overweight or obese range, you may be at increased risk for weight-related health problems, such as high blood pressure, heart disease, stroke, arthritis or joint pain, and diabetes. If your BMI is in the underweight range, you may be at increased risk for health problems such as fatigue, lower protection (immunity) against illness, muscle loss, bone loss, hair loss, and hormone problems. BMI is just one measure of your risk for weight-related health problems. You may be at higher risk for health problems if you are not active, you eat an unhealthy diet, or you drink too much alcohol or use tobacco products. Follow-up care is a key part of your treatment and safety. Be sure to make and go to all appointments, and call your doctor if you are having problems. It's also a good idea to know your test results and keep a list of the medicines you take. How can you care for yourself at home? · Practice healthy eating habits. This includes eating plenty of fruits, vegetables, whole grains, lean protein, and low-fat dairy. · If your doctor recommends it, get more exercise. Walking is a good choice. Bit by bit, increase the amount you walk every day. Try for at least 30 minutes on most days of the week. · Do not smoke. Smoking can increase your risk for health problems. If you need help quitting, talk to your doctor about stop-smoking programs and medicines. These can increase your chances of quitting for good. · Limit alcohol to 2 drinks a day for men and 1 drink a day for women. Too much alcohol can cause health problems. If you have a BMI higher than 25  · Your doctor may do other tests to check your risk for weight-related health problems. This may include measuring the distance around your waist. A waist measurement of more than 40 inches in men or 35 inches in women can increase the risk of weight-related health problems. · Talk with your doctor about steps you can take to stay healthy or improve your health. You may need to make lifestyle changes to lose weight and stay healthy, such as changing your diet and getting regular exercise. If you have a BMI lower than 18.5  · Your doctor may do other tests to check your risk for health problems. · Talk with your doctor about steps you can take to stay healthy or improve your health. You may need to make lifestyle changes to gain or maintain weight and stay healthy, such as getting more healthy foods in your diet and doing exercises to build muscle. Where can you learn more? Go to http://florin-kevin.info/. Enter S176 in the search box to learn more about \"Body Mass Index: Care Instructions. \"  Current as of: October 13, 2016  Content Version: 11.4  © 3386-5966 [employer]. Care instructions adapted under license by [employer] (which disclaims liability or warranty for this information). If you have questions about a medical condition or this instruction, always ask your healthcare professional. [address] any warranty or liability for your use of this information. Learning About Diabetes Food Guidelines  Your Care Instructions    Meal planning is important to manage diabetes. It helps keep your blood sugar at a target level (which you set with your doctor). You don't have to eat special foods.  You can eat what your family eats, including sweets once in a while. But you do have to pay attention to how often you eat and how much you eat of certain foods. You may want to work with a dietitian or a certified diabetes educator (CDE) to help you plan meals and snacks. A dietitian or CDE can also help you lose weight if that is one of your goals. What should you know about eating carbs? Managing the amount of carbohydrate (carbs) you eat is an important part of healthy meals when you have diabetes. Carbohydrate is found in many foods. · Learn which foods have carbs. And learn the amounts of carbs in different foods. ¨ Bread, cereal, pasta, and rice have about 15 grams of carbs in a serving. A serving is 1 slice of bread (1 ounce), ½ cup of cooked cereal, or 1/3 cup of cooked pasta or rice. ¨ Fruits have 15 grams of carbs in a serving. A serving is 1 small fresh fruit, such as an apple or orange; ½ of a banana; ½ cup of cooked or canned fruit; ½ cup of fruit juice; 1 cup of melon or raspberries; or 2 tablespoons of dried fruit. ¨ Milk and no-sugar-added yogurt have 15 grams of carbs in a serving. A serving is 1 cup of milk or 2/3 cup of no-sugar-added yogurt. ¨ Starchy vegetables have 15 grams of carbs in a serving. A serving is ½ cup of mashed potatoes or sweet potato; 1 cup winter squash; ½ of a small baked potato; ½ cup of cooked beans; or ½ cup cooked corn or green peas. · Learn how much carbs to eat each day and at each meal. A dietitian or CDE can teach you how to keep track of the amount of carbs you eat. This is called carbohydrate counting. · If you are not sure how to count carbohydrate grams, use the Plate Method to plan meals. It is a good, quick way to make sure that you have a balanced meal. It also helps you spread carbs throughout the day. ¨ Divide your plate by types of foods.  Put non-starchy vegetables on half the plate, meat or other protein food on one-quarter of the plate, and a grain or starchy vegetable in the final quarter of the plate. To this you can add a small piece of fruit and 1 cup of milk or yogurt, depending on how many carbs you are supposed to eat at a meal.  · Try to eat about the same amount of carbs at each meal. Do not \"save up\" your daily allowance of carbs to eat at one meal.  · Proteins have very little or no carbs per serving. Examples of proteins are beef, chicken, turkey, fish, eggs, tofu, cheese, cottage cheese, and peanut butter. A serving size of meat is 3 ounces, which is about the size of a deck of cards. Examples of meat substitute serving sizes (equal to 1 ounce of meat) are 1/4 cup of cottage cheese, 1 egg, 1 tablespoon of peanut butter, and ½ cup of tofu. How can you eat out and still eat healthy? · Learn to estimate the serving sizes of foods that have carbohydrate. If you measure food at home, it will be easier to estimate the amount in a serving of restaurant food. · If the meal you order has too much carbohydrate (such as potatoes, corn, or baked beans), ask to have a low-carbohydrate food instead. Ask for a salad or green vegetables. · If you use insulin, check your blood sugar before and after eating out to help you plan how much to eat in the future. · If you eat more carbohydrate at a meal than you had planned, take a walk or do other exercise. This will help lower your blood sugar. What else should you know? · Limit saturated fat, such as the fat from meat and dairy products. This is a healthy choice because people who have diabetes are at higher risk of heart disease. So choose lean cuts of meat and nonfat or low-fat dairy products. Use olive or canola oil instead of butter or shortening when cooking. · Don't skip meals. Your blood sugar may drop too low if you skip meals and take insulin or certain medicines for diabetes. · Check with your doctor before you drink alcohol. Alcohol can cause your blood sugar to drop too low.  Alcohol can also cause a bad reaction if you take certain diabetes medicines. Follow-up care is a key part of your treatment and safety. Be sure to make and go to all appointments, and call your doctor if you are having problems. It's also a good idea to know your test results and keep a list of the medicines you take. Where can you learn more? Go to http://florin-kevin.info/. Enter H015 in the search box to learn more about \"Learning About Diabetes Food Guidelines. \"  Current as of: March 13, 2017  Content Version: 11.4  © 7720-8082 [employer]. Care instructions adapted under license by [employer] (which disclaims liability or warranty for this information). If you have questions about a medical condition or this instruction, always ask your healthcare professional. [address] any warranty or liability for your use of this information.

## 2018-01-24 NOTE — PROGRESS NOTES
1. Have you been to the ER, urgent care clinic, or been hospitalized since your last visit? No     2. Have you seen or consulted any other health care providers outside of the [address] [place] [address] since your last visit?  No     Reviewed record in preparation for visit and have necessary documentation  opportunity was given for questions  Goals that were addressed and/or need to be completed during or after this appointment include     Health Maintenance Due   Topic Date Due    DTaP/Tdap/Td series (1 - Tdap) 07/07/1966    GLAUCOMA SCREENING Q2Y  07/07/2010    EYE EXAM RETINAL OR DILATED Q1  11/12/2014    FOBT Q 1 YEAR AGE 50-75  02/24/2016    BREAST CANCER SCRN MAMMOGRAM  04/14/2016    FOOT EXAM Q1  06/20/2017

## 2018-01-24 NOTE — PROGRESS NOTES
Anil Blackman  67 y.o. female  1945  [address] [facility]  [id number]     [facility]: Progress Note       Encounter Date: 1/24/2018    Chief Complaint   Patient presents with    Diabetes    Hypertension       Patient was accompanied by patient  History of Present Illness   Anil Blackman is a 67 y.o. female who presents to clinic today for:    Diabetes Follow up: Controlled   Overall the patient feels she is well   Diabetic Consultants:Endocrinologist - N/A   Therapy:diet   Medication compliance:Good   Frequency of home glucose testing: N/A   Blood Sugar range at home: N/A   Polyuria, polyphagia or polydipsia: NO   Retinopathy: NO   Neuropathy SX: NO   Low blood sugar symptoms: No   Dietary compliance: Good   On ASA: No   Pertinent Labs:      Hemoglobin A1c   Date Value Ref Range Status   10/02/2017 5.3 4.8 - 5.6 % Final     Comment:              Pre-diabetes: 5.7 - 6.4           Diabetes: >6.4           Glycemic control for adults with diabetes: <7.0     04/25/2017 5.8 (H) 4.8 - 5.6 % Final     Comment:              Pre-diabetes: 5.7 - 6.4           Diabetes: >6.4           Glycemic control for adults with diabetes: <7.0     06/20/2016 6.1 (H) 4.8 - 5.6 % Final     Comment:              Pre-diabetes: 5.7 - 6.4           Diabetes: >6.4           Glycemic control for adults with diabetes: <7.0       Estimated average glucose   Date Value Ref Range Status   10/02/2017 105 mg/dL Final   04/25/2017 120 mg/dL Final   06/20/2016 128 mg/dL Final     Cholesterol, total   Date Value Ref Range Status   10/02/2017 175 100 - 199 mg/dL Final     HDL Cholesterol   Date Value Ref Range Status   10/02/2017 65 >39 mg/dL Final        Wt Readings from Last 3 Encounters:   01/24/18 195 lb (88.5 kg)   10/02/17 192 lb 12.8 oz (87.5 kg)   09/11/17 191 lb (86.6 kg)        History   Smoking Status    Former Smoker   Smokeless Tobacco    Never Used     Hypertension: Uncontrolled   BP Readings from Last 3 Encounters:   01/24/18 137/71   10/02/17 145/72   09/11/17 180/83     The patient reports:  taking medications as instructed, no medication side effects noted, no TIA's, no chest pain on exertion, no dyspnea on exertion, no swelling of ankles. Sodium   Date Value Ref Range Status   10/02/2017 140 134 - 144 mmol/L Final     Potassium   Date Value Ref Range Status   10/02/2017 4.3 3.5 - 5.2 mmol/L Final     Chloride   Date Value Ref Range Status   10/02/2017 101 96 - 106 mmol/L Final     Creatinine   Date Value Ref Range Status   10/02/2017 0.73 0.57 - 1.00 mg/dL Final   04/25/2017 0.73 0.57 - 1.00 mg/dL Final   06/20/2016 0.76 0.57 - 1.00 mg/dL Final     GFR est AA   Date Value Ref Range Status   10/02/2017 95 >59 mL/min/1.73 Final   04/25/2017 96 >59 mL/min/1.73 Final   06/20/2016 92 >59 mL/min/1.73 Final       Our goal is to normalize the blood pressure to decrease the risks of strokes and heart attacks. The patient is in agreement with the plan. Vitamin D Deficiency:Low  Patient is currently taking Vitamin D3 2,000 units. Report that she is feeling well on current supplementation. VITAMIN D, 25-HYDROXY   Date Value Ref Range Status   04/25/2017 11.9 (L) 30.0 - 100.0 ng/mL Final   06/20/2016 7.7 (L) 30.0 - 100.0 ng/mL Final       Health Maintenance  Patient reports that she has been unable to afford TDaP in the past; will postpone. - Did not schedule kasey due to knee pain and difficulty of transportation. Discussed with patient importance of screening and she believes that she should be able to schedule soon. Health Maintenance Due   Topic Date Due    DTaP/Tdap/Td series (1 - Tdap) 07/07/1966    GLAUCOMA SCREENING Q2Y  07/07/2010    EYE EXAM RETINAL OR DILATED Q1  11/12/2014    FOBT Q 1 YEAR AGE 50-75  02/24/2016    BREAST CANCER SCRN MAMMOGRAM  04/14/2016    FOOT EXAM Q1  06/20/2017     Review of Systems   Review of Systems   Constitutional: Negative for chills and fever.    HENT: Negative for congestion, sinus pain and sore throat. Respiratory: Negative for cough and hemoptysis. Cardiovascular: Negative for chest pain and leg swelling. Gastrointestinal: Negative for abdominal pain, constipation, diarrhea, nausea and vomiting. Genitourinary: Negative for dysuria, frequency and urgency. Skin: Negative for itching and rash. Neurological: Negative for dizziness and headaches. Vitals/Objective:     Vitals:    01/24/18 0834 01/24/18 0840 01/24/18 0855   BP: 166/82 154/74 137/71   Pulse: 82     Resp: 18     Temp: 98.1 °F (36.7 °C)     TempSrc: Oral     SpO2: 100%     Weight: 195 lb (88.5 kg)     Height: 5' 2\" (1.575 m)       Body mass index is 35.67 kg/(m^2). Physical Exam   Constitutional: She is oriented to person, place, and time. She appears well-developed and well-nourished. HENT:   Head: Normocephalic and atraumatic. Right Ear: External ear normal.   Left Ear: External ear normal.   Eyes: Conjunctivae are normal. Pupils are equal, round, and reactive to light. Neck: Normal range of motion. Neck supple. No thyromegaly present. Cardiovascular: Normal rate and regular rhythm. No murmur heard. Pulmonary/Chest: Effort normal.   Musculoskeletal: She exhibits no edema or tenderness. Lymphadenopathy:     She has no cervical adenopathy. Neurological: She is alert and oriented to person, place, and time. Skin: Skin is warm and dry. No results found for this or any previous visit (from the past 24 hour(s)). Assessment and Plan:     Encounter Diagnoses     ICD-10-CM ICD-9-CM   1. Diabetes mellitus type 2, diet-controlled (HCC) E11.9 250.00   2. Essential hypertension I10 401.9   3. Vitamin D deficiency E55.9 268.9   4. 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 Tardive dyskinesia G24.01 333.85   6. BMI 35.0-35.9,adult Z68.35 V85.35   7. Screening for malignant neoplasm of colon Z12.11 V76.51       1.  Diabetes mellitus type 2, diet-controlled [facility])  Patient is on high risk medications and has had elevated HbA1b in the past.   - HM DIABETES FOOT EXAM  - FUNDUS PHOTOGRAPHY  - LIPID PANEL  - HEMOGLOBIN A1C WITH EAG    2. Essential hypertension  Will increase the HCTZ to 25 mg as patient BP runs consistently high.   - METABOLIC PANEL, BASIC  - lisinopril (PRINIVIL, ZESTRIL) 20 mg tablet; Take 1 Tab by mouth daily. Dispense: 90 Tab; Refill: 2  - hydroCHLOROthiazide (HYDRODIURIL) 12.5 mg tablet; Take 2 Tabs by mouth daily. Dispense: 30 Tab; Refill: 0    3. Vitamin D deficiency  Will recheck level. - VITAMIN D, 25 HYDROXY  - Cholecalciferol, Vitamin D3, (VITAMIN D3) 2,000 unit cap capsule; Take 2,000 Units by mouth daily. Indications: Vitamin D Deficiency  Dispense: 90 Cap; Refill: 3    4. Undifferentiated schizophrenia [facility])  Patient is followed by Dr. Brenda Villarreal of [facility] in [facility]. - lurasidone (LATUDA) 80 mg tab tablet; Take 1 Tab by mouth two (2) times a day. Dispense: 30 Tab; Refill: 0  - ARIPiprazole (ABILIFY) 15 mg tablet; Take 1 Tab by mouth daily. - hydrOXYzine HCl (ATARAX) 25 mg tablet; Take 1 Tab by mouth two (2) times a day. - benztropine (COGENTIN) 0.5 mg tablet; Take 1 Tab by mouth two (2) times a day. 5. Tardive dyskinesia  - benztropine (COGENTIN) 0.5 mg tablet; Take 1 Tab by mouth two (2) times a day. 6. BMI 35.0-35.9,adult  Discussed the patient's BMI with her. The BMI follow up plan is as follows:     dietary management education, guidance, and counseling  encourage exercise  monitor weight  prescribed dietary intake    An After Visit Summary was printed and given to the patient. 7. Screening for malignant neoplasm of colon  - OCCULT BLOOD, IMMUNOASSAY (FIT)      I have discussed the diagnosis with the patient and the intended plan as seen in the above orders. she has expressed understanding. The patient has received an after-visit summary and questions were answered concerning future plans.   I have discussed medication side effects and warnings with the patient as well. Electronically Signed: Beatrice Farnsworth MD     History/Allergies   Patients past medical, surgical and family histories were reviewed and updated. Past Medical History:   Diagnosis Date    Hypertension     Psychotic disorder       Past Surgical History:   Procedure Laterality Date    HX GYN       No family history on file. Social History     Social History    Marital status:      Spouse name: N/A    Number of children: N/A    Years of education: N/A     Occupational History    Not on file. Social History Main Topics    Smoking status: Former Smoker    Smokeless tobacco: Never Used    Alcohol use No    Drug use: No    Sexual activity: Not on file     Other Topics Concern    Not on file     Social History Narrative         Allergies   Allergen Reactions    Bactrim [Sulfamethoxazole-Trimethoprim] Nausea Only       Disposition     Follow-up Disposition:  Return in about 4 months (around 5/24/2018) for Routine. .    Future Appointments  Date Time Provider [place] Jackson   1/24/2018 9:10 AM Beatrice Farnsworth MD [facility] SCHED            Current Medications after this visit     Current Outpatient Prescriptions   Medication Sig    lurasidone (LATUDA) 80 mg tab tablet Take 1 Tab by mouth two (2) times a day.  ARIPiprazole (ABILIFY) 15 mg tablet Take 1 Tab by mouth daily.  lisinopril (PRINIVIL, ZESTRIL) 20 mg tablet Take 1 Tab by mouth daily.  hydroCHLOROthiazide (HYDRODIURIL) 12.5 mg tablet Take 2 Tabs by mouth daily.  diclofenac EC (VOLTAREN) 75 mg EC tablet TAKE ONE TABLET BY MOUTH TWICE DAILY AS NEEDED    hydrOXYzine HCl (ATARAX) 25 mg tablet Take 1 Tab by mouth two (2) times a day.  benztropine (COGENTIN) 0.5 mg tablet Take 1 Tab by mouth two (2) times a day.  Cholecalciferol, Vitamin D3, (VITAMIN D3) 2,000 unit cap capsule Take 2,000 Units by mouth daily.  Indications: Vitamin D Deficiency    OLANZapine (ZYPREXA) 10 mg tablet Take 10 mg by mouth nightly.  fluPHENAZine (PROLIXIN) 5 mg tablet     fluPHENAZine decanoate (PROLIXIN) 25 mg/mL injection      No current facility-administered medications for this visit.       Medications Discontinued During This Encounter   Medication Reason    lurasidone (LATUDA) 80 mg tab tablet Reorder    ARIPiprazole (ABILIFY) 15 mg tablet Reorder    lisinopril (PRINIVIL, ZESTRIL) 20 mg tablet Reorder    hydroCHLOROthiazide (HYDRODIURIL) 12.5 mg tablet Reorder    diclofenac EC (VOLTAREN) 75 mg EC tablet Reorder    hydrOXYzine HCl (ATARAX) 25 mg tablet Reorder    benztropine (COGENTIN) 0.5 mg tablet Reorder    VITAMIN D3 2,000 unit cap capsule Reorder

## 2018-01-25 ENCOUNTER — TELEPHONE (OUTPATIENT)
Dept: FAMILY MEDICINE CLINIC | Age: 73
End: 2018-01-25

## 2018-01-25 DIAGNOSIS — E87.1 HYPONATREMIA: Primary | ICD-10-CM

## 2018-01-25 LAB
25(OH)D3+25(OH)D2 SERPL-MCNC: 34.7 NG/ML (ref 30–100)
BUN SERPL-MCNC: 12 MG/DL (ref 8–27)
BUN/CREAT SERPL: 11 (ref 12–28)
CALCIUM SERPL-MCNC: 9.1 MG/DL (ref 8.7–10.3)
CHLORIDE SERPL-SCNC: 89 MMOL/L (ref 96–106)
CHOLEST SERPL-MCNC: 170 MG/DL (ref 100–199)
CO2 SERPL-SCNC: 23 MMOL/L (ref 18–29)
CREAT SERPL-MCNC: 1.06 MG/DL (ref 0.57–1)
EST. AVERAGE GLUCOSE BLD GHB EST-MCNC: 120 MG/DL
GFR SERPLBLD CREATININE-BSD FMLA CKD-EPI: 53 ML/MIN/1.73
GFR SERPLBLD CREATININE-BSD FMLA CKD-EPI: 61 ML/MIN/1.73
GLUCOSE SERPL-MCNC: 91 MG/DL (ref 65–99)
HBA1C MFR BLD: 5.8 % (ref 4.8–5.6)
HDLC SERPL-MCNC: 59 MG/DL
LDLC SERPL CALC-MCNC: 99 MG/DL (ref 0–99)
Lab: NORMAL
POTASSIUM SERPL-SCNC: 4.5 MMOL/L (ref 3.5–5.2)
SODIUM SERPL-SCNC: 129 MMOL/L (ref 134–144)
TRIGL SERPL-MCNC: 60 MG/DL (ref 0–149)
VLDLC SERPL CALC-MCNC: 12 MG/DL (ref 5–40)

## 2018-01-25 NOTE — TELEPHONE ENCOUNTER
Advised per lab letter: Your electrolytes are a little off. Please return to clinic in 1 week for repeat blood work (lab appt).  Do NOT fast prior to blood work please and remember to hydrate well!!

## 2018-02-01 ENCOUNTER — OFFICE VISIT (OUTPATIENT)
Dept: FAMILY MEDICINE CLINIC | Age: 73
End: 2018-02-01

## 2018-02-01 VITALS
HEIGHT: 62 IN | WEIGHT: 196.2 LBS | BODY MASS INDEX: 36.1 KG/M2 | HEART RATE: 78 BPM | OXYGEN SATURATION: 99 % | RESPIRATION RATE: 18 BRPM | DIASTOLIC BLOOD PRESSURE: 56 MMHG | TEMPERATURE: 97.9 F | SYSTOLIC BLOOD PRESSURE: 113 MMHG

## 2018-02-01 DIAGNOSIS — S01.81XA LACERATION OF FACE WITHOUT COMPLICATION, INITIAL ENCOUNTER: ICD-10-CM

## 2018-02-01 DIAGNOSIS — E11.9 DIABETES MELLITUS TYPE 2, DIET-CONTROLLED (HCC): ICD-10-CM

## 2018-02-01 DIAGNOSIS — Z48.02 ENCOUNTER FOR REMOVAL OF SUTURES: ICD-10-CM
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
## 2018-02-01 PROBLEM — E11.21 TYPE 2 DIABETES MELLITUS WITH NEPHROPATHY (HCC): Status: ACTIVE | Noted: 2018-02-01

## 2018-02-01 NOTE — PROGRESS NOTES
Chief Complaint   Patient presents with    Suture Removal     Erroll Shingles on Friday sutures to forehead     1. Have you been to the ER, urgent care clinic since your last visit? Hospitalized since your last visit? 01/26/2018, [facility] ER, Fall with injury to her face and forehead. 2. Have you seen or consulted any other health care providers outside of the [address] [place] [address] since your last visit? Include any pap smears or colon screening.  No

## 2018-02-01 NOTE — MR AVS SNAPSHOT
Milad Arellano 
 
 
 [address] [address] 
[phone] Patient: Nirmal Putnam MRN: [id number] [facility]:8/7/6641 Visit Information Date & Time Provider Department Dept. Phone Encounter #  
 2/1/2018  8:50 AM [facility] Lopez MD [facility] [id number] Follow-up Instructions Return if symptoms worsen or fail to improve. Your Appointments 2/1/2018  8:50 AM  
ROUTINE CARE with Saad Lopez MD  
[address] [place] [address] ([address] [place] [address] Road) Appt Note: Lachelle Melara on Friday. Went to the [facility]. F/U  
 2005 A [facility] [address] [facility]   
[phone]  
  
   
 [facility] [address]  
  
    
 5/24/2018  9:10 AM  
ROUTINE CARE with Saad Lopez MD  
[address] [place] [address] ([address] [place] [address]) Appt Note: 4 month fu  
 [address] A [address] [id number] [phone] Upcoming Health Maintenance Date Due  
 GLAUCOMA SCREENING Q2Y 7/7/2010 EYE EXAM RETINAL OR DILATED Q1 11/12/2014 FOBT Q 1 YEAR AGE 50-75 2/24/2016 BREAST CANCER SCRN MAMMOGRAM 4/14/2016 DTaP/Tdap/Td series (1 - Tdap) 4/24/2018* MEDICARE YEARLY EXAM 6/2/2018* MICROALBUMIN Q1 4/25/2018 HEMOGLOBIN A1C Q6M 7/24/2018 FOOT EXAM Q1 1/24/2019 LIPID PANEL Q1 1/24/2019 *Topic was postponed. The date shown is not the original due date. Allergies as of 2/1/2018  Review Complete On: 2/1/2018 By: Andrew Geiger LPN Severity Noted Reaction Type Reactions Bactrim [Sulfamethoxazole-trimethoprim]  06/27/2013    Nausea Only Current Immunizations  Reviewed on 10/13/2016 Name Date Influenza High Dose Vaccine PF 10/2/2017 Influenza Vaccine (Quad) PF 10/13/2016, 2/9/2016, 9/25/2014 Influenza Vaccine Split 10/16/2012 Pneumococcal Conjugate (PCV-13) 10/24/2017 Pneumococcal Polysaccharide (PPSV-23) 10/22/2013 Not reviewed this visit You Were Diagnosed With   
  
 Codes Comments Fall, initial encounter    -  Primary ICD-10-CM: W19. Uriel Alexis ICD-9-CM: E888.9 Laceration of face without complication, initial encounter     ICD-10-CM: S01.81XA ICD-9-CM: 873.40 Encounter for removal of sutures     ICD-10-CM: Z48.02 
ICD-9-CM: V58.32 Diabetes mellitus type 2, diet-controlled ([facility] [address]     ICD-10-CM: E11.9 ICD-9-CM: 250.00 Vitals BP Pulse Temp Resp Height(growth percentile) Weight(growth percentile) 113/56 (BP 1 Location: Left arm, BP Patient Position: Sitting) 78 97.9 °F (36.6 °C) (Oral) 18 5' 2\" (1.575 m) 196 lb 3.2 oz (89 kg) SpO2 BMI OB Status Smoking Status 99% 35.89 kg/m2 Postmenopausal Former Smoker Vitals History BMI and BSA Data Body Mass Index Body Surface Area  
 35.89 kg/m 2 1.97 m 2 Preferred Pharmacy Pharmacy Name Phone [address] [place], [place] - [address] [phone] Your Updated Medication List  
  
   
This list is accurate as of: 2/1/18  8:37 AM.  Always use your most recent med list.  
  
  
  
  
 ARIPiprazole 15 mg tablet Commonly known as:  ABILIFY Take 1 Tab by mouth daily. benztropine 0.5 mg tablet Commonly known as:  COGENTIN Take 1 Tab by mouth two (2) times a day. Cholecalciferol (Vitamin D3) 2,000 unit Cap capsule Commonly known as:  VITAMIN D3 Take 2,000 Units by mouth daily. Indications: Vitamin D Deficiency  
  
 diclofenac EC 75 mg EC tablet Commonly known as:  VOLTAREN  
TAKE ONE TABLET BY MOUTH TWICE DAILY AS NEEDED  
  
 hydroCHLOROthiazide 12.5 mg tablet Commonly known as:  HYDRODIURIL Take 2 Tabs by mouth daily. hydrOXYzine HCl 25 mg tablet Commonly known as:  ATARAX Take 1 Tab by mouth two (2) times a day. LATUDA 80 mg Tab tablet Generic drug:  lurasidone Take 1 Tab by mouth two (2) times a day. lisinopril 20 mg tablet Commonly known as:  Cleotis Evans Take 1 Tab by mouth daily. OLANZapine 10 mg tablet Commonly known as:  ZyPREXA Take 10 mg by mouth nightly. We Performed the Following REFERRAL TO OPHTHALMOLOGY [REF57 Custom] Follow-up Instructions Return if symptoms worsen or fail to improve. Referral Information Referral ID Referred By Referred To  
  
 [id number] Gita, [address] [facility], [place]   
   [address] [place] [address]   
   [place], [address] Phone: [phone] Fax: [phone] Visits Status Start Date End Date 1 New Request 2/1/18 2/1/19 If your referral has a status of pending review or denied, additional information will be sent to support the outcome of this decision. Patient Instructions Preventing Outdoor Falls: Care Instructions Your Care Instructions Worries about falls don't need to keep you indoors. Outdoor activities like walking have big benefits for your health. You will need to watch your step and learn a few safety measures. If you are worried about having a fall outdoors, ask your doctor about exercises, classes, or physical therapy that may help. You can learn ways to gain strength, flexibility, and balance. Ask if it might help to use a cane or walker. You can make your time outdoors safer with a few simple measures. Follow-up care is a key part of your treatment and safety. Be sure to make and go to all appointments, and call your doctor if you are having problems. It's also a good idea to know your test results and keep a list of the medicines you take. How can you prevent falls outdoors? · Wear shoes with firm soles and low heels. If you have to walk on an icy surface, use grippers that can be worn over your shoes in bad weather. · Be extra careful if weather is bad.  Walk on the grass when the sidewalks are slick. If you live in a place that gets snow and ice in the winter, sprinkle salt on slippery stairs and sidewalks. · Be careful getting on or off buses and trains or getting in and out of cars. If handrails are available, use them. · Be careful when you cross the street. Look for crosswalks or places where curb cuts or ramps are present. · Try not to hurry, especially if you are carrying something. · Be cautious in parking lots or garages. There may be curbs or changes in pavement, or the height of the pavement may vary. · Make sure to wear the correct eyeglasses, if you need them. Reading glasses or bifocals can make it harder to see hazards that might be in your way. · If you are walking outdoors for exercise, try to: 
¨ Walk in well-lighted, well-maintained areas. These include high school or college tracks, shopping malls, and public spaces. ¨ Walk with a partner. ¨ Watch out for cracked sidewalks, curbs, changes in the height of the pavement, exposed tree roots, and debris such as fallen leaves or branches. Where can you learn more? Go to http://florin-kevin.info/. Enter B488 in the search box to learn more about \"Preventing Outdoor Falls: Care Instructions. \" Current as of: May 12, 2017 Content Version: 11.4 © 7796-2517 Medigo. Care instructions adapted under license by [employer] (which disclaims liability or warranty for this information). If you have questions about a medical condition or this instruction, always ask your healthcare professional. [place] [address] any warranty or liability for your use of this information. Introducing [facility]! [address] introduces CrowdT[employer] patient portal. Now you can access parts of your medical record, email your doctor's office, and request medication refills online. 1. In your internet browser, go to [email or web address] 2. Click on the First Time User? Click Here link in the Sign In box. You will see the New Member Sign Up page. 3. Enter your Posto7 Access Code exactly as it appears below. You will not need to use this code after youve completed the sign-up process. If you do not sign up before the expiration date, you must request a new code. · Posto7 Access Code: [email or web address] Expires: 4/24/2018  8:52 AM 
 
4. Enter the last four digits of your Social Security Number (xxxx) and Date of Birth (mm/dd/yyyy) as indicated and click Submit. You will be taken to the next sign-up page. 5. Create a Posto7 ID. This will be your Posto7 login ID and cannot be changed, so think of one that is secure and easy to remember. 6. Create a Posto7 password. You can change your password at any time. 7. Enter your Password Reset Question and Answer. This can be used at a later time if you forget your password. 8. Enter your e-mail address. You will receive e-mail notification when new information is available in [address] 9. Click Sign Up. You can now view and download portions of your medical record. 10. Click the Download Summary menu link to download a portable copy of your medical information. If you have questions, please visit the Frequently Asked Questions section of the Posto7 website. Remember, Posto7 is NOT to be used for urgent needs. For medical emergencies, dial 911. Now available from your iPhone and Android! Please provide this summary of care documentation to your next provider. Your primary care clinician is listed as Geri Alvarado. If you have any questions after today's visit, please call [phone].

## 2018-02-01 NOTE — PROGRESS NOTES
Usha Beltran  67 y.o. female  1945  [address] [facility]  [id number]     [facility]: Progress Note       Encounter Date: 2/1/2018    Chief Complaint   Patient presents with    Suture Removal     Kylie Lock on Friday sutures to forehead       Patient was accompanied by none   History of Present Illness   Usha Beltran is a 67 y.o. female who presents to clinic today for:    Fall and laceration to forehead  Patient reports that on Friday afternoon she was at [facility] and fell in the parking lot. Suffered a laceration to the forehead. Was seen at [place], [place] where she states she had a head scan and stitches placed in forehead. Here today for folow up and suture removal. Patient reports that swelling around has improved greatly. She has a cane since fall and reports that she is using it and feels that it helps her with balance. Health Maintenance  Referral for eye exam  Health Maintenance Due   Topic Date Due    GLAUCOMA SCREENING Q2Y  07/07/2010    EYE EXAM RETINAL OR DILATED Q1  11/12/2014    FOBT Q 1 YEAR AGE 50-75  02/24/2016    BREAST CANCER SCRN MAMMOGRAM  04/14/2016     Review of Systems   Review of Systems   Musculoskeletal: Negative for falls (since the incident on Friday. ). Skin: Negative for itching and rash. Neurological: Negative for dizziness, focal weakness, seizures, loss of consciousness and headaches. Vitals/Objective:     Vitals:    02/01/18 0816   BP: 113/56   Pulse: 78   Resp: 18   Temp: 97.9 °F (36.6 °C)   TempSrc: Oral   SpO2: 99%   Weight: 196 lb 3.2 oz (89 kg)   Height: 5' 2\" (1.575 m)     Body mass index is 35.89 kg/(m^2). Physical Exam   HENT:   Head: Head is with contusion (right cheek swollen and ecchoymoses). No results found for this or any previous visit (from the past 24 hour(s)). Assessment and Plan:     Encounter Diagnoses     ICD-10-CM ICD-9-CM   1.  Fall, initial encounter W19.XXXA E888.9   2. Laceration of face without complication, initial encounter S01.81XA 873.40   3. Encounter for removal of sutures Z48.02 V58.32   4. Diabetes mellitus type 2, diet-controlled ([facility]) E11.9 250.00       1. Fall, initial encounter  2. Laceration of face without complication, initial encounter  3. Encounter for removal of sutures  Sutures removed. Patient is healing well. 4. Diabetes mellitus type 2, diet-controlled ([facility] [address]  - REFERRAL TO OPHTHALMOLOGY    I have discussed the diagnosis with the patient and the intended plan as seen in the above orders. she has expressed understanding. The patient has received an after-visit summary and questions were answered concerning future plans. I have discussed medication side effects and warnings with the patient as well. Electronically Signed: Keny Worthy MD     History/Allergies   Patients past medical, surgical and family histories were reviewed and updated. Past Medical History:   Diagnosis Date    Hypertension     Psychotic disorder       Past Surgical History:   Procedure Laterality Date    HX GYN       History reviewed. No pertinent family history. Social History     Social History    Marital status:      Spouse name: N/A    Number of children: N/A    Years of education: N/A     Occupational History    Not on file. Social History Main Topics    Smoking status: Former Smoker    Smokeless tobacco: Never Used    Alcohol use No    Drug use: No    Sexual activity: Not on file     Other Topics Concern    Not on file     Social History Narrative         Allergies   Allergen Reactions    Bactrim [Sulfamethoxazole-Trimethoprim] Nausea Only       Disposition     Follow-up Disposition:  Return if symptoms worsen or fail to improve.     Future Appointments  Date Time Provider Cl[place]is Jackson   5/24/2018 9:10 AM Keny Worthy MD [facility]EY SCHED            Current Medications after this visit     Current Outpatient Prescriptions   Medication Sig    lurasidone (LATUDA) 80 mg tab tablet Take 1 Tab by mouth two (2) times a day.  ARIPiprazole (ABILIFY) 15 mg tablet Take 1 Tab by mouth daily.  lisinopril (PRINIVIL, ZESTRIL) 20 mg tablet Take 1 Tab by mouth daily.  hydroCHLOROthiazide (HYDRODIURIL) 12.5 mg tablet Take 2 Tabs by mouth daily.  diclofenac EC (VOLTAREN) 75 mg EC tablet TAKE ONE TABLET BY MOUTH TWICE DAILY AS NEEDED    hydrOXYzine HCl (ATARAX) 25 mg tablet Take 1 Tab by mouth two (2) times a day.  benztropine (COGENTIN) 0.5 mg tablet Take 1 Tab by mouth two (2) times a day.  Cholecalciferol, Vitamin D3, (VITAMIN D3) 2,000 unit cap capsule Take 2,000 Units by mouth daily. Indications: Vitamin D Deficiency    OLANZapine (ZYPREXA) 10 mg tablet Take 10 mg by mouth nightly. No current facility-administered medications for this visit. There are no discontinued medications.

## 2018-02-01 NOTE — PATIENT INSTRUCTIONS
Preventing Outdoor Falls: Care Instructions  Your Care Instructions    Worries about falls don't need to keep you indoors. Outdoor activities like walking have big benefits for your health. You will need to watch your step and learn a few safety measures. If you are worried about having a fall outdoors, ask your doctor about exercises, classes, or physical therapy that may help. You can learn ways to gain strength, flexibility, and balance. Ask if it might help to use a cane or walker. You can make your time outdoors safer with a few simple measures. Follow-up care is a key part of your treatment and safety. Be sure to make and go to all appointments, and call your doctor if you are having problems. It's also a good idea to know your test results and keep a list of the medicines you take. How can you prevent falls outdoors? · Wear shoes with firm soles and low heels. If you have to walk on an icy surface, use grippers that can be worn over your shoes in bad weather. · Be extra careful if weather is bad. Walk on the grass when the sidewalks are slick. If you live in a place that gets snow and ice in the winter, sprinkle salt on slippery stairs and sidewalks. · Be careful getting on or off buses and trains or getting in and out of cars. If handrails are available, use them. · Be careful when you cross the street. Look for crosswalks or places where curb cuts or ramps are present. · Try not to hurry, especially if you are carrying something. · Be cautious in parking lots or garages. There may be curbs or changes in pavement, or the height of the pavement may vary. · Make sure to wear the correct eyeglasses, if you need them. Reading glasses or bifocals can make it harder to see hazards that might be in your way. · If you are walking outdoors for exercise, try to:  ¨ Walk in well-lighted, well-maintained areas. These include high school or college tracks, shopping malls, and public spaces.   ¨ Walk with a partner. ¨ Watch out for cracked sidewalks, curbs, changes in the height of the pavement, exposed tree roots, and debris such as fallen leaves or branches. Where can you learn more? Go to http://florin-kevin.info/. Enter L654 in the search box to learn more about \"Preventing Outdoor Falls: Care Instructions. \"  Current as of: May 12, 2017  Content Version: 11.4  © 1333-1095 HiWi[employer]. Care instructions adapted under license by [employer] (which disclaims liability or warranty for this information). If you have questions about a medical condition or this instruction, always ask your healthcare professional. [address] any warranty or liability for your use of this information. Body Mass Index: Care Instructions  Your Care Instructions    Body mass index (BMI) can help you see if your weight is raising your risk for health problems. It uses a formula to compare how much you weigh with how tall you are. · A BMI lower than 18.5 is considered underweight. · A BMI between 18.5 and 24.9 is considered healthy. · A BMI between 25 and 29.9 is considered overweight. A BMI of 30 or higher is considered obese. If your BMI is in the normal range, it means that you have a lower risk for weight-related health problems. If your BMI is in the overweight or obese range, you may be at increased risk for weight-related health problems, such as high blood pressure, heart disease, stroke, arthritis or joint pain, and diabetes. If your BMI is in the underweight range, you may be at increased risk for health problems such as fatigue, lower protection (immunity) against illness, muscle loss, bone loss, hair loss, and hormone problems. BMI is just one measure of your risk for weight-related health problems.  You may be at higher risk for health problems if you are not active, you eat an unhealthy diet, or you drink too much alcohol or use tobacco products. Follow-up care is a key part of your treatment and safety. Be sure to make and go to all appointments, and call your doctor if you are having problems. It's also a good idea to know your test results and keep a list of the medicines you take. How can you care for yourself at home? · Practice healthy eating habits. This includes eating plenty of fruits, vegetables, whole grains, lean protein, and low-fat dairy. · If your doctor recommends it, get more exercise. Walking is a good choice. Bit by bit, increase the amount you walk every day. Try for at least 30 minutes on most days of the week. · Do not smoke. Smoking can increase your risk for health problems. If you need help quitting, talk to your doctor about stop-smoking programs and medicines. These can increase your chances of quitting for good. · Limit alcohol to 2 drinks a day for men and 1 drink a day for women. Too much alcohol can cause health problems. If you have a BMI higher than 25  · Your doctor may do other tests to check your risk for weight-related health problems. This may include measuring the distance around your waist. A waist measurement of more than 40 inches in men or 35 inches in women can increase the risk of weight-related health problems. · Talk with your doctor about steps you can take to stay healthy or improve your health. You may need to make lifestyle changes to lose weight and stay healthy, such as changing your diet and getting regular exercise. If you have a BMI lower than 18.5  · Your doctor may do other tests to check your risk for health problems. · Talk with your doctor about steps you can take to stay healthy or improve your health. You may need to make lifestyle changes to gain or maintain weight and stay healthy, such as getting more healthy foods in your diet and doing exercises to build muscle. Where can you learn more? Go to http://florin-kevin.info/.   Enter S176 in the search box to learn more about \"Body Mass Index: Care Instructions. \"  Current as of: October 13, 2016  Content Version: 11.4  © 6078-2919 Healthwise, [employer]. Care instructions adapted under license by [employer] (which disclaims liability or warranty for this information). If you have questions about a medical condition or this instruction, always ask your healthcare professional. [address] any warranty or liability for your use of this information.

## 2018-02-02 DIAGNOSIS — E87.1 HYPONATREMIA: Primary | ICD-10-CM

## 2018-02-02 LAB
BUN SERPL-MCNC: 22 MG/DL (ref 8–27)
BUN/CREAT SERPL: 20 (ref 12–28)
CALCIUM SERPL-MCNC: 9.1 MG/DL (ref 8.7–10.3)
CHLORIDE SERPL-SCNC: 87 MMOL/L (ref 96–106)
CO2 SERPL-SCNC: 22 MMOL/L (ref 18–29)
CREAT SERPL-MCNC: 1.1 MG/DL (ref 0.57–1)
GFR SERPLBLD CREATININE-BSD FMLA CKD-EPI: 50 ML/MIN/1.73
GFR SERPLBLD CREATININE-BSD FMLA CKD-EPI: 58 ML/MIN/1.73
GLUCOSE SERPL-MCNC: 118 MG/DL (ref 65–99)
POTASSIUM SERPL-SCNC: 4 MMOL/L (ref 3.5–5.2)
SODIUM SERPL-SCNC: 125 MMOL/L (ref 134–144)

## 2018-02-05 ENCOUNTER — TELEPHONE (OUTPATIENT)
Dept: FAMILY MEDICINE CLINIC | Age: 73
End: 2018-02-05

## 2018-02-05 NOTE — TELEPHONE ENCOUNTER
Attempted to call. No answer. Message left. Your sodium is still rather low. Please stop taking your hydrochlorothiazide (HCTZ) and return next week (after Wednesday 2/7) to get your blood re-drawn.

## 2018-03-23 ENCOUNTER — OFFICE VISIT (OUTPATIENT)
Dept: FAMILY MEDICINE CLINIC | Age: 73
End: 2018-03-23

## 2018-03-23 ENCOUNTER — TELEPHONE (OUTPATIENT)
Dept: FAMILY MEDICINE CLINIC | Age: 73
End: 2018-03-23

## 2018-03-23 VITALS
RESPIRATION RATE: 18 BRPM | WEIGHT: 193 LBS | SYSTOLIC BLOOD PRESSURE: 158 MMHG | TEMPERATURE: 98.1 F | BODY MASS INDEX: 35.51 KG/M2 | OXYGEN SATURATION: 97 % | DIASTOLIC BLOOD PRESSURE: 79 MMHG | HEART RATE: 69 BPM | HEIGHT: 62 IN

## 2018-03-23 DIAGNOSIS — M25.561 BILATERAL CHRONIC KNEE PAIN: Primary | ICD-10-CM

## 2018-03-23 DIAGNOSIS — Z12.31 ENCOUNTER FOR SCREENING MAMMOGRAM FOR MALIGNANT NEOPLASM OF BREAST: ICD-10-CM

## 2018-03-23 DIAGNOSIS — Z12.11 SCREENING FOR MALIGNANT NEOPLASM OF COLON: ICD-10-CM

## 2018-03-23 DIAGNOSIS — G89.29 BILATERAL CHRONIC KNEE PAIN: Primary | ICD-10-CM

## 2018-03-23 DIAGNOSIS — E11.21 TYPE 2 DIABETES MELLITUS WITH NEPHROPATHY (HCC): ICD-10-CM

## 2018-03-23 DIAGNOSIS — M25.562 BILATERAL CHRONIC KNEE PAIN: Primary | ICD-10-CM

## 2018-03-23 RX ORDER — PHENOL/SODIUM PHENOLATE
20 AEROSOL, SPRAY (ML) MUCOUS MEMBRANE DAILY
Qty: 30 TAB | Refills: 6 | Status: SHIPPED | OUTPATIENT
Start: 2018-03-23 | End: 2018-06-11

## 2018-03-23 RX ORDER — DICLOFENAC SODIUM 75 MG/1
TABLET, DELAYED RELEASE ORAL
Qty: 60 TAB | Refills: 0 | Status: SHIPPED | OUTPATIENT
Start: 2018-03-23 | End: 2018-04-18 | Stop reason: SDUPTHER

## 2018-03-23 NOTE — PROGRESS NOTES
1. Have you been to the ER, urgent care clinic, or been hospitalized since your last visit? No       2. Have you seen or consulted any other health care providers outside of the [address] [place] [address] since your last visit?    No     Reviewed record in preparation for visit and have necessary documentation  opportunity was given for questions  Goals that were addressed and/or need to be completed during or after this appointment include     Health Maintenance Due   Topic Date Due    GLAUCOMA SCREENING Q2Y  07/07/2010    EYE EXAM RETINAL OR DILATED Q1  11/12/2014    FOBT Q 1 YEAR AGE 50-75  02/24/2016    BREAST CANCER SCRN MAMMOGRAM  04/14/2016

## 2018-03-23 NOTE — TELEPHONE ENCOUNTER
Pt called stating that she received a letter to go to [facility] for eye exam but she states that it's too far for her to travel. She wants to know if there is somewhere in [place] she can go.     Best contact number is: [phone]

## 2018-03-23 NOTE — PROGRESS NOTES
Marty Huizar  67 y.o. female  1945  [address] [facility]  [id number]     [facility]: Progress Note       Encounter Date: 3/23/2018    Chief Complaint   Patient presents with    Knee Pain     bilateral knee pain       History provided by patient  History of Present Illness   Marty Huizar is a 67 y.o. female who presents to clinic today for:    Knee pain, bilateral  Patient present with cc of bilateral knee pain worsen over the last 3 months. Pain is worse with movement. Pain is radiates up hip posteriorly bilaterally. She has been taking diclofenac for the pain with good results. Health Maintenance  Reviewed and ordered as discussed with patient. Health Maintenance Due   Topic Date Due    GLAUCOMA SCREENING Q2Y  07/07/2010    EYE EXAM RETINAL OR DILATED Q1  11/12/2014    FOBT Q 1 YEAR AGE 50-75  02/24/2016    BREAST CANCER SCRN MAMMOGRAM  04/14/2016     Review of Systems   Review of Systems   Constitutional: Negative for chills and fever. Gastrointestinal: Negative for abdominal pain, constipation, diarrhea, nausea and vomiting. Musculoskeletal: Positive for joint pain. Negative for falls. Skin: Negative for itching and rash. Neurological: Negative for dizziness and headaches. Vitals/Objective:     Vitals:    03/23/18 0915 03/23/18 0918 03/23/18 0952   BP: 175/68 174/82 158/79   Pulse: 77 73 69   Resp: 18     Temp: 98.1 °F (36.7 °C)     TempSrc: Oral     SpO2: 97%     Weight: 193 lb (87.5 kg)     Height: 5' 2\" (1.575 m)       Body mass index is 35.3 kg/(m^2). Wt Readings from Last 3 Encounters:   03/23/18 193 lb (87.5 kg)   02/01/18 196 lb 3.2 oz (89 kg)   01/24/18 195 lb (88.5 kg)       Physical Exam   Constitutional: She appears well-developed and well-nourished. HENT:   Head: Normocephalic and atraumatic. Cardiovascular: Normal rate and regular rhythm.     Pulmonary/Chest: Effort normal and breath sounds normal.   Musculoskeletal:  Knee: bilaterally  Knee Effusion: + left knee  Quadriceps atrophy: None   Popliteal (Bakers) Cyst: Negative on right. Positive on Left   Patellofemoral crepitus: Negative  Q angle:     ROM:  Flexion: 130  Extension: 0   Hip IR/ER: FROM without pain    Palpation:   Patella tenderness: None  Patellar tendon tenderness: None  Quad tendon tenderness: None  Medial joint line tenderness: None  Lateral joint line tenderness: None  MCL tenderness: None  LCL tenderness: None  Medial facet tenderness: None  Lateral facet tenderness: None  Condyle tenderness: None  Tibia tubercle tenderness: None  Proximal fibula tenderness: None  Plica tenderness: None  Prepatellar bursa tenderness: None  Pes bursa tenderness: None  ITB tenderness: None    Ligament/Meniscal Exam:  Patellar Grind: Negative   Patellar apprehension (medial/lateral): Negative   Lochman: Negative, with good endpoint   Anterior Drawer: Negative   Posterior Drawer: Negative   Valgus stress: Negative with good endpoint   Varus stress: Negative with good endpoint   Dial test: Negative   Nirav: Negative   Shira sign: Negative. Strength (0-5/5):   Flexion: Left: 5/5    Right: 5/5    Extension: Left: 5/5    Right: 5/5    Hip abduction: 5/5    Hip adduction: 5/5            No results found for this or any previous visit (from the past 24 hour(s)). Assessment and Plan:     Encounter Diagnoses     ICD-10-CM ICD-9-CM   1. Bilateral chronic knee pain M25.561 719.46    M25.562 338.29    G89.29    2. Type 2 diabetes mellitus with nephropathy (HCC) E11.21 250.40     583.81   3. Screening for malignant neoplasm of colon Z12.11 V76.51   4. Encounter for screening mammogram for malignant neoplasm of breast Z12.31 V76.12       1. Bilateral chronic knee pain  Patient will continue with NSAID and routine monitoring of blood work. Advised if pain is not managed with current medication to return to office for possible injection.    - diclofenac EC (VOLTAREN) 75 mg EC tablet; TAKE ONE TABLET BY MOUTH TWICE DAILY AS NEEDED  Dispense: 61 Tab; Refill: 0  - Omeprazole delayed release (PRILOSEC D/R) 20 mg tablet; Take 1 Tab by mouth daily. Indications: PREVENTION OF NSAID-INDUCED GASTRIC ULCER  Dispense: 30 Tab; Refill: 6    2. Type 2 diabetes mellitus with nephropathy ([facility] [address]  Iris was unable to capture sufficient image at last visit. Referral for sent to eye doctor. Patient had missed appointment. 3. Screening for malignant neoplasm of colon  - OCCULT BLOOD, IMMUNOASSAY (FIT)    4. Encounter for screening mammogram for malignant neoplasm of breast  - DYLAN MAMMO BI SCREENING INCL CAD; Future    I have discussed the diagnosis with the patient and the intended plan as seen in the above orders. she has expressed understanding. The patient has received an after-visit summary and questions were answered concerning future plans. I have discussed medication side effects and warnings with the patient as well. Electronically Signed: Shilpa Estrella MD     History/Allergies   Patients past medical, surgical and family histories were reviewed and updated. Past Medical History:   Diagnosis Date    Hypertension     Psychotic disorder       Past Surgical History:   Procedure Laterality Date    HX GYN       No family history on file. Social History     Social History    Marital status:      Spouse name: N/A    Number of children: N/A    Years of education: N/A     Occupational History    Not on file. Social History Main Topics    Smoking status: Former Smoker    Smokeless tobacco: Never Used    Alcohol use No    Drug use: No    Sexual activity: Not on file     Other Topics Concern    Not on file     Social History Narrative         Allergies   Allergen Reactions    Bactrim [Sulfamethoxazole-Trimethoprim] Nausea Only       Disposition     Follow-up Disposition:  Return if symptoms worsen or fail to improve, for keep scheduled appt.  .    Future Appointments  Date Time Provider Clovis Jackson   5/24/2018 9:10 AM Mishel Lawler MD [facility] SCHED            Current Medications after this visit     Current Outpatient Prescriptions   Medication Sig    diclofenac EC (VOLTAREN) 75 mg EC tablet TAKE ONE TABLET BY MOUTH TWICE DAILY AS NEEDED    Omeprazole delayed release (PRILOSEC D/R) 20 mg tablet Take 1 Tab by mouth daily. Indications: PREVENTION OF NSAID-INDUCED GASTRIC ULCER    lurasidone (LATUDA) 80 mg tab tablet Take 1 Tab by mouth two (2) times a day.  ARIPiprazole (ABILIFY) 15 mg tablet Take 1 Tab by mouth daily.  lisinopril (PRINIVIL, ZESTRIL) 20 mg tablet Take 1 Tab by mouth daily.  hydroCHLOROthiazide (HYDRODIURIL) 12.5 mg tablet Take 2 Tabs by mouth daily.  hydrOXYzine HCl (ATARAX) 25 mg tablet Take 1 Tab by mouth two (2) times a day.  benztropine (COGENTIN) 0.5 mg tablet Take 1 Tab by mouth two (2) times a day.  Cholecalciferol, Vitamin D3, (VITAMIN D3) 2,000 unit cap capsule Take 2,000 Units by mouth daily. Indications: Vitamin D Deficiency    OLANZapine (ZYPREXA) 10 mg tablet Take 10 mg by mouth nightly. No current facility-administered medications for this visit.       Medications Discontinued During This Encounter   Medication Reason    diclofenac EC (VOLTAREN) 75 mg EC tablet Reorder

## 2018-03-23 NOTE — PATIENT INSTRUCTIONS
Colon Cancer Screening: Care Instructions  Your Care Instructions    Colorectal cancer occurs in the colon or rectum. That's the lower part of your digestive system. It is the second-leading cause of cancer deaths in the [place]. It often starts with small growths called polyps in the colon or rectum. Polyps are usually found with screening tests. Depending on the type of test, any polyps found may be removed during the tests. Colorectal cancer usually does not cause symptoms at first. But regular tests can help find it early, before it spreads and becomes harder to treat. Experts advise routine tests for colon cancer for people starting at age 48. And they advise people with a higher risk of colon cancer to get tested sooner. Talk with your doctor about when you should start testing. Discuss which tests you need. Follow-up care is a key part of your treatment and safety. Be sure to make and go to all appointments, and call your doctor if you are having problems. It's also a good idea to know your test results and keep a list of the medicines you take. What are the main screening tests for colon cancer? · Stool tests. These include the fecal immunochemical test (FIT) and the fecal occult blood test (FOBT). These tests check stool samples for signs of cancer. If your test is positive, you will need to have a colonoscopy. · Sigmoidoscopy. This test lets your doctor look at the lining of your rectum and the lowest part of your colon. Your doctor uses a lighted tube called a sigmoidoscope. This test can't find cancers or polyps in the upper part of your colon. In some cases, polyps that are found can be removed. But if your doctor finds polyps, you will need to have a colonoscopy to check the upper part of your colon. · Colonoscopy. This test lets your doctor look at the lining of your rectum and your entire colon. The doctor uses a thin, flexible tool called a colonoscope.  It can also be used to remove polyps or get a tissue sample (biopsy). What tests do you need? The following guidelines are for people age 48 and over who are not at high risk for colorectal cancer. You may have at least one of these tests as directed by your doctor. · Fecal immunochemical test (FIT) or fecal occult blood test (FOBT) every year  · Sigmoidoscopy every 5 years  · Colonoscopy every 10 years  If you are age 68 to 80, you can work with your doctor to decide if screening is a good option. If you are age 80 or older, your doctor will likely advise that screening is not helpful. Talk with your doctor about when you need to be tested. And discuss which tests are right for you. Your doctor may recommend earlier or more frequent testing if you:  · Have had colorectal cancer before. · Have had colon polyps. · Have symptoms of colorectal cancer. These include blood in your stool and changes in your bowel habits. · Have a parent, brother or sister, or child with colon polyps or colorectal cancer. · Have a bowel disease. This includes ulcerative colitis and Crohn's disease. · Have a rare polyp syndrome that runs in families, such as familial adenomatous polyposis (FAP). · Have had radiation treatments to the belly or pelvis. When should you call for help? Watch closely for changes in your health, and be sure to contact your doctor if:  ? · You have any changes in your bowel habits. ? · You have any problems. Where can you learn more? Go to http://florin-kevin.info/. Enter M541 in the search box to learn more about \"Colon Cancer Screening: Care Instructions. \"  Current as of: May 12, 2017  Content Version: 11.4  © 6644-1829 [employer]. Care instructions adapted under license by [employer] (which disclaims liability or warranty for this information).  If you have questions about a medical condition or this instruction, always ask your healthcare professional. Katy Burnett disclaims any warranty or liability for your use of this information.

## 2018-03-23 NOTE — MR AVS SNAPSHOT
[address] [facility] [address] 
 
 
 [address] [facility] [address] [address] 
[phone] Patient: Usha Beltran MRN: [id number] Visit Information Date & Time Provider Department Dept. Phone Encounter #  
 3/23/2018  9:30 AM Noa Irizarry MD [address] [facility] [id number] Follow-up Instructions Return if symptoms worsen or fail to improve, for keep scheduled appt. .  
  
Your Appointments 5/24/2018  9:10 AM  
ROUTINE CARE with Noa Irizarry MD  
[address] [facility] Appt Note: 4 month fu  
 [address] [facility] [address] [address]  
[place] [address] [place] [id number] Upcoming Health Maintenance Date Due  
 GLAUCOMA SCREENING Q2Y 7/7/2010 EYE EXAM RETINAL OR DILATED Q1 11/12/2014 FOBT Q 1 YEAR AGE 50-75 2/24/2016 BREAST CANCER SCRN MAMMOGRAM 4/14/2016 DTaP/Tdap/Td series (1 - Tdap) 4/24/2018* MICROALBUMIN Q1 4/25/2018 HEMOGLOBIN A1C Q6M 7/24/2018 FOOT EXAM Q1 1/24/2019 LIPID PANEL Q1 1/24/2019 *Topic was postponed. The date shown is not the original due date. Allergies as of 3/23/2018  Review Complete On: 3/23/2018 By: Missouri Sacks, LPN Severity Noted Reaction Type Reactions Bactrim [Sulfamethoxazole-trimethoprim]  06/27/2013    Nausea Only Current Immunizations  Reviewed on 10/13/2016 Name Date Influenza High Dose Vaccine PF 10/2/2017 Influenza Vaccine (Quad) PF 10/13/2016, 2/9/2016, 9/25/2014 Influenza Vaccine Split 10/16/2012 Pneumococcal Conjugate (PCV-13) 10/24/2017 Pneumococcal Polysaccharide (PPSV-23) 10/22/2013 Not reviewed this visit You Were Diagnosed With   
  
 Codes Comments Bilateral chronic knee pain    -  Primary ICD-10-CM: M25.561, M25.562, G89.29 ICD-9-CM: 719.46, 338.29   
 Type 2 diabetes mellitus with nephropathy (HCC)     ICD-10-CM: E11.21 
ICD-9-CM: 250.40, 583.81 Screening for malignant neoplasm of colon     ICD-10-CM: Z12.11 ICD-9-CM: V76.51 Encounter for screening mammogram for malignant neoplasm of breast     ICD-10-CM: Z12.31 
ICD-9-CM: V76.12 Vitals BP Pulse Temp Resp Height(growth percentile) Weight(growth percentile) 174/82 73 98.1 °F (36.7 °C) (Oral) 18 5' 2\" (1.575 m) 193 lb (87.5 kg) SpO2 BMI OB Status Smoking Status 97% 35.3 kg/m2 Postmenopausal Former Smoker Vitals History BMI and BSA Data Body Mass Index Body Surface Area  
 35.3 kg/m 2 1.96 m 2 Preferred Pharmacy Pharmacy Name Phone [address] [place], [place] - [address] [phone] Your Updated Medication List  
  
   
This list is accurate as of 3/23/18  9:49 AM.  Always use your most recent med list.  
  
  
  
  
 ARIPiprazole 15 mg tablet Commonly known as:  ABILIFY Take 1 Tab by mouth daily. benztropine 0.5 mg tablet Commonly known as:  COGENTIN Take 1 Tab by mouth two (2) times a day. Cholecalciferol (Vitamin D3) 2,000 unit Cap capsule Commonly known as:  VITAMIN D3 Take 2,000 Units by mouth daily. Indications: Vitamin D Deficiency  
  
 diclofenac EC 75 mg EC tablet Commonly known as:  VOLTAREN  
TAKE ONE TABLET BY MOUTH TWICE DAILY AS NEEDED  
  
 hydroCHLOROthiazide 12.5 mg tablet Commonly known as:  HYDRODIURIL Take 2 Tabs by mouth daily. hydrOXYzine HCl 25 mg tablet Commonly known as:  ATARAX Take 1 Tab by mouth two (2) times a day. LATUDA 80 mg Tab tablet Generic drug:  lurasidone Take 1 Tab by mouth two (2) times a day. lisinopril 20 mg tablet Commonly known as:  Rennis Douse Take 1 Tab by mouth daily. OLANZapine 10 mg tablet Commonly known as:  ZyPREXA Take 10 mg by mouth nightly. Omeprazole delayed release 20 mg tablet Commonly known as:  PRILOSEC D/R Take 1 Tab by mouth daily. Indications: PREVENTION OF NSAID-INDUCED GASTRIC ULCER Prescriptions Sent to Pharmacy Refills  
 diclofenac EC (VOLTAREN) 75 mg EC tablet 0 Sig: TAKE ONE TABLET BY MOUTH TWICE DAILY AS NEEDED Class: Normal  
 Pharmacy: [address] [place] [address] #: [phone] Omeprazole delayed release (PRILOSEC D/R) 20 mg tablet 6 Sig: Take 1 Tab by mouth daily. Indications: PREVENTION OF NSAID-INDUCED GASTRIC ULCER Class: Normal  
 Pharmacy: [address] [place] [address] #: [phone] Route: Oral  
  
We Performed the Following FUNDUS PHOTOGRAPHY [id number] CPT(R)] OCCULT BLOOD, IMMUNOASSAY (FIT) [id number] CPT(R)] Follow-up Instructions Return if symptoms worsen or fail to improve, for keep scheduled appt. Lance Germain To-Do List   
 03/23/2018 Imaging:  DYLAN MAMMO BI SCREENING INCL CAD Patient Instructions Colon Cancer Screening: Care Instructions Your Care Instructions Colorectal cancer occurs in the colon or rectum. That's the lower part of your digestive system. It is the second-leading cause of cancer deaths in the [place]. It often starts with small growths called polyps in the colon or rectum. Polyps are usually found with screening tests. Depending on the type of test, any polyps found may be removed during the tests. Colorectal cancer usually does not cause symptoms at first. But regular tests can help find it early, before it spreads and becomes harder to treat. Experts advise routine tests for colon cancer for people starting at age 48. And they advise people with a higher risk of colon cancer to get tested sooner. Talk with your doctor about when you should start testing. Discuss which tests you need. Follow-up care is a key part of your treatment and safety.  Be sure to make and go to all appointments, and call your doctor if you are having problems. It's also a good idea to know your test results and keep a list of the medicines you take. What are the main screening tests for colon cancer? · Stool tests. These include the fecal immunochemical test (FIT) and the fecal occult blood test (FOBT). These tests check stool samples for signs of cancer. If your test is positive, you will need to have a colonoscopy. · Sigmoidoscopy. This test lets your doctor look at the lining of your rectum and the lowest part of your colon. Your doctor uses a lighted tube called a sigmoidoscope. This test can't find cancers or polyps in the upper part of your colon. In some cases, polyps that are found can be removed. But if your doctor finds polyps, you will need to have a colonoscopy to check the upper part of your colon. · Colonoscopy. This test lets your doctor look at the lining of your rectum and your entire colon. The doctor uses a thin, flexible tool called a colonoscope. It can also be used to remove polyps or get a tissue sample (biopsy). What tests do you need? The following guidelines are for people age 48 and over who are not at high risk for colorectal cancer. You may have at least one of these tests as directed by your doctor. · Fecal immunochemical test (FIT) or fecal occult blood test (FOBT) every year · Sigmoidoscopy every 5 years · Colonoscopy every 10 years If you are age 68 to 80, you can work with your doctor to decide if screening is a good option. If you are age 80 or older, your doctor will likely advise that screening is not helpful. Talk with your doctor about when you need to be tested. And discuss which tests are right for you. Your doctor may recommend earlier or more frequent testing if you: 
· Have had colorectal cancer before. · Have had colon polyps. · Have symptoms of colorectal cancer. These include blood in your stool and changes in your bowel habits. · Have a parent, brother or sister, or child with colon polyps or colorectal cancer. · Have a bowel disease. This includes ulcerative colitis and Crohn's disease. · Have a rare polyp syndrome that runs in families, such as familial adenomatous polyposis (FAP). · Have had radiation treatments to the belly or pelvis. When should you call for help? Watch closely for changes in your health, and be sure to contact your doctor if: 
? · You have any changes in your bowel habits. ? · You have any problems. Where can you learn more? Go to http://florin-kevin.info/. Enter M541 in the search box to learn more about \"Colon Cancer Screening: Care Instructions. \" Current as of: May 12, 2017 Content Version: 11.4 © 5783-3204 [employer]. Care instructions adapted under license by [employer] (which disclaims liability or warranty for this information). If you have questions about a medical condition or this instruction, always ask your healthcare professional. [place] [address] any warranty or liability for your use of this information. Introducing [facility]! Latricia Meyers introduces [employer] patient portal. Now you can access parts of your medical record, email your doctor's office, and request medication refills online. 1. In your internet browser, go to [email or web address]. [email or web address] 2. Click on the First Time User? Click Here link in the Sign In box. You will see the New Member Sign Up page. 3. Enter your [employer] Access Code exactly as it appears below. You will not need to use this code after youve completed the sign-up process. If you do not sign up before the expiration date, you must request a new code. · [employer] Access Code: [email or web address] Expires: 4/24/2018  9:52 AM 
 
4. Enter the last four digits of your Social Security Number (xxxx) and Date of Birth (mm/dd/yyyy) as indicated and click Submit.  You will be taken to the next sign-up page. 5. Create a [employer] ID. This will be your [employer] login ID and cannot be changed, so think of one that is secure and easy to remember. 6. Create a [employer] password. You can change your password at any time. 7. Enter your Password Reset Question and Answer. This can be used at a later time if you forget your password. 8. Enter your e-mail address. You will receive e-mail notification when new information is available in [address]. 9. Click Sign Up. You can now view and download portions of your medical record. 10. Click the Download Summary menu link to download a portable copy of your medical information. If you have questions, please visit the Frequently Asked Questions section of the [employer] website. Remember, [employer] is NOT to be used for urgent needs. For medical emergencies, dial 911. Now available from your iPhone and Android! Please provide this summary of care documentation to your next provider. Your primary care clinician is listed as Mazin Nicole. If you have any questions after today's visit, please call [phone].

## 2018-03-24 LAB
BUN SERPL-MCNC: 16 MG/DL (ref 8–27)
BUN/CREAT SERPL: 18 (ref 12–28)
CALCIUM SERPL-MCNC: 9.2 MG/DL (ref 8.7–10.3)
CHLORIDE SERPL-SCNC: 100 MMOL/L (ref 96–106)
CO2 SERPL-SCNC: 24 MMOL/L (ref 18–29)
CREAT SERPL-MCNC: 0.9 MG/DL (ref 0.57–1)
GFR SERPLBLD CREATININE-BSD FMLA CKD-EPI: 64 ML/MIN/1.73
GFR SERPLBLD CREATININE-BSD FMLA CKD-EPI: 74 ML/MIN/1.73
GLUCOSE SERPL-MCNC: 89 MG/DL (ref 65–99)
POTASSIUM SERPL-SCNC: 4.3 MMOL/L (ref 3.5–5.2)
SODIUM SERPL-SCNC: 138 MMOL/L (ref 134–144)

## 2018-04-13 ENCOUNTER — OFFICE VISIT (OUTPATIENT)
Dept: FAMILY MEDICINE CLINIC | Age: 73
End: 2018-04-13

## 2018-04-13 VITALS
TEMPERATURE: 98 F | SYSTOLIC BLOOD PRESSURE: 175 MMHG | DIASTOLIC BLOOD PRESSURE: 89 MMHG | BODY MASS INDEX: 36.44 KG/M2 | WEIGHT: 198 LBS | HEART RATE: 87 BPM | RESPIRATION RATE: 18 BRPM | OXYGEN SATURATION: 99 % | HEIGHT: 62 IN

## 2018-04-13 DIAGNOSIS — M79.89 PAIN AND SWELLING OF LEFT LOWER LEG: Primary | ICD-10-CM

## 2018-04-13 DIAGNOSIS — M79.662 PAIN AND SWELLING OF LEFT LOWER LEG: Primary | ICD-10-CM

## 2018-04-13 PROBLEM — E66.01 SEVERE OBESITY (BMI 35.0-39.9) WITH COMORBIDITY (HCC): Status: ACTIVE | Noted: 2018-04-13

## 2018-04-13 NOTE — PROGRESS NOTES
1. Have you been to the ER, urgent care clinic, or been hospitalized since your last visit? No     2. Have you seen or consulted any other health care providers outside of the [address] [place] [address] since your last visit?   No       Reviewed record in preparation for visit and have necessary documentation  opportunity was given for questions  Goals that were addressed and/or need to be completed during or after this appointment include     Health Maintenance Due   Topic Date Due    GLAUCOMA SCREENING Q2Y  07/07/2010    EYE EXAM RETINAL OR DILATED Q1  11/12/2014    FOBT Q 1 YEAR AGE 50-75  02/24/2016    BREAST CANCER SCRN MAMMOGRAM  04/14/2016    MICROALBUMIN Q1  04/25/2018

## 2018-04-13 NOTE — PROGRESS NOTES
Mario Alberto Dee  67 y.o. female  1945  [address] [facility]  [id number]     [facility]: Progress Note       Encounter Date: 4/13/2018    Chief Complaint   Patient presents with    Leg Swelling     left leg       History provided by patient  History of Present Illness   Mario Alberto Dee is a 67 y.o. female who presents to clinic today for:    Left leg swelling  Patient present with cc of left leg swelling. Patient reports that her left leg has been present beginning this week. She had attributed pain to arthritis of her left knee which has been a chronic problem for her. LLE is painful and erythematous. Denies CP, SOB, fever, chills. No recent falls or prolonged sedentary positioning. Review of Systems   Review of Systems   Constitutional: Negative for chills and fever. Cardiovascular: Positive for leg swelling. Negative for chest pain, palpitations and claudication. Gastrointestinal: Negative for abdominal pain, constipation, diarrhea, nausea and vomiting. Skin: Positive for rash. Negative for itching. Neurological: Negative for dizziness and headaches. Vitals/Objective:     Vitals:    04/13/18 1307   BP: 175/89   Pulse: 87   Resp: 18   Temp: 98 °F (36.7 °C)   TempSrc: Oral   SpO2: 99%   Weight: 198 lb (89.8 kg)   Height: 5' 2\" (1.575 m)     Body mass index is 36.21 kg/(m^2). Wt Readings from Last 3 Encounters:   04/13/18 198 lb (89.8 kg)   03/23/18 193 lb (87.5 kg)   02/01/18 196 lb 3.2 oz (89 kg)       Physical Exam   Constitutional: She is oriented to person, place, and time. She appears well-developed and well-nourished. HENT:   Head: Normocephalic and atraumatic. Cardiovascular: Normal rate and regular rhythm. No murmur heard. Pulmonary/Chest: Effort normal and breath sounds normal.   Musculoskeletal: She exhibits edema and tenderness. Right lower leg: Normal.        Left lower leg: She exhibits tenderness and swelling (and erythematous). Neurological: She is alert and oriented to person, place, and time. No results found for this or any previous visit (from the past 24 hour(s)). Assessment and Plan:     Encounter Diagnoses     ICD-10-CM ICD-9-CM   1. Pain and swelling of left lower leg M79.662 729.5    M79.89 729.81       1. Pain and swelling of left lower leg  Cancelled appt for Doppler at [facility]. Patient's Granddaughter, Ez Montes De Oca, is unable to bring her grandmother this afternoon to [facility] for an appointment. Advised Ms Clifford Rojas and Ms Deann Winkler to go to nearest ER for evaluation and testing of possible  DVT. - DUPLEX LOWER EXT VENOUS BILAT; Future    I have discussed the diagnosis with the patient and the intended plan as seen in the above orders. she has expressed understanding. The patient has received an after-visit summary and questions were answered concerning future plans. I have discussed medication side effects and warnings with the patient as well. Electronically Signed: Raghu Matos MD     History/Allergies   Patients past medical, surgical and family histories were reviewed and updated. Past Medical History:   Diagnosis Date    Hypertension     Psychotic disorder       Past Surgical History:   Procedure Laterality Date    HX GYN       No family history on file. Social History     Social History    Marital status:      Spouse name: N/A    Number of children: N/A    Years of education: N/A     Occupational History    Not on file. Social History Main Topics    Smoking status: Former Smoker    Smokeless tobacco: Never Used    Alcohol use No    Drug use: No    Sexual activity: Not on file     Other Topics Concern    Not on file     Social History Narrative         Allergies   Allergen Reactions    Bactrim [Sulfamethoxazole-Trimethoprim] Nausea Only       Disposition     Follow-up Disposition:  Return if symptoms worsen or fail to improve, for ER for transport.  .    Future Appointments  Date Time Provider [place] Renetta   4/14/2018 9:30 AM VAS ROOM 1 [facility]   5/24/2018 9:10 AM Ronel Evans MD [facility] PETEY SCHED            Current Medications after this visit     Current Outpatient Prescriptions   Medication Sig    diclofenac EC (VOLTAREN) 75 mg EC tablet TAKE ONE TABLET BY MOUTH TWICE DAILY AS NEEDED    Omeprazole delayed release (PRILOSEC D/R) 20 mg tablet Take 1 Tab by mouth daily. Indications: PREVENTION OF NSAID-INDUCED GASTRIC ULCER    lurasidone (LATUDA) 80 mg tab tablet Take 1 Tab by mouth two (2) times a day.  ARIPiprazole (ABILIFY) 15 mg tablet Take 1 Tab by mouth daily.  lisinopril (PRINIVIL, ZESTRIL) 20 mg tablet Take 1 Tab by mouth daily.  hydroCHLOROthiazide (HYDRODIURIL) 12.5 mg tablet Take 2 Tabs by mouth daily.  hydrOXYzine HCl (ATARAX) 25 mg tablet Take 1 Tab by mouth two (2) times a day.  benztropine (COGENTIN) 0.5 mg tablet Take 1 Tab by mouth two (2) times a day.  Cholecalciferol, Vitamin D3, (VITAMIN D3) 2,000 unit cap capsule Take 2,000 Units by mouth daily. Indications: Vitamin D Deficiency    OLANZapine (ZYPREXA) 10 mg tablet Take 10 mg by mouth nightly. No current facility-administered medications for this visit. There are no discontinued medications.

## 2018-04-13 NOTE — PATIENT INSTRUCTIONS
Concern for a blood clot in lower left leg. Please go to an ER for testing. Learning About Deep Vein Thrombosis  What is deep vein thrombosis? A deep vein thrombosis (DVT) is a blood clot in certain veins of the legs, pelvis, or arms. The clot is usually in the legs. DVT may damage the vein and cause the area to ache, swell, and change color. DVT also can lead to sores. DVT in these veins needs to be treated because the clots can get bigger, break loose, and travel through the bloodstream to the lungs. A blood clot in a lung can cause death. Blood clots can form in the veins when you are not active for a long period of time. For example, they can form if you need to stay in bed because of a health problem or must sit for a long time on an airplane or in a car. Surgery or an injury can damage your blood vessels and cause a clot to form. Cancer also can cause DVT. And some people have blood that clots too easily, which is a problem that may run in families. A risk factor is something that makes you more likely to develop a disease. Here are some major risk factors for DVT:  · You have surgery. · You have to stay in bed for more than 3 days (such as in the hospital). · Your blood is likely to clot because of an injury, cancer, or inherited condition. Here are some minor risk factors for DVT:  · You take birth control hormones. · You are pregnant. · You are in a car or airplane for a long trip. What are the symptoms? Symptoms of DVT may include:  · Swelling in the affected area. · Redness and warmth in the affected area. · Pain or tenderness. You may have pain only when you touch the affected area or when you stand or walk. If your doctor thinks you may have DVT, you will probably have an ultrasound test. You may have other tests as well. How can you prevent DVT? · Exercise your lower leg muscles to help blood flow in your legs.  Point your toes up toward your head so the calves of your legs are stretched, then relax and repeat. This is a good exercise to do when you are sitting for long periods of time. · Get out of bed as soon as you can after an illness or surgery. If you need to stay in bed, do the leg exercise noted above every hour when you are awake. · Use special stockings called compression stockings. These stockings are tight at the feet with a gradually looser fit on the leg. Many doctors recommend that you wear compression stockings during a journey longer than 8 hours. · Take breaks when you are on long trips. Stop the car and walk around. On long airplane flights, walk up and down the aisle hourly, flex and point your feet every 20 minutes while sitting, and drink plenty of water. · Take blood-thinning medicines after some types of surgery if your doctor recommends it. Blood thinners also may be used if you are likely to develop clots. How is DVT treated? Treatment for DVT usually involves taking blood thinners. These medicines are given through a vein (intravenously, or IV) or as a pill. Talk with your doctor about which medicine is right for you. Your doctor also may suggest that you prop up or elevate your leg when possible, take walks, and wear compression stockings. These measures may help reduce the pain and swelling that can happen with DVT. Follow-up care is a key part of your treatment and safety. Be sure to make and go to all appointments, and call your doctor if you are having problems. It's also a good idea to know your test results and keep a list of the medicines you take. Where can you learn more? Go to http://florin-kevin.info/. Enter I375 in the search box to learn more about \"Learning About Deep Vein Thrombosis. \"  Current as of: March 20, 2017  Content Version: 11.4  © 2102-4288 [employer]. Care instructions adapted under license by [employer] (which disclaims liability or warranty for this information).  If you have questions about a medical condition or this instruction, always ask your healthcare professional. [place] [address] any warranty or liability for your use of this information.

## 2018-04-13 NOTE — MR AVS SNAPSHOT
[address] 
 
 
 [address] [address] 
[phone] Patient: Dennie Sequin MRN: [id number] NZA:[id number] Visit Information Date & Time Provider Department Dept. Phone Encounter #  
 4/13/2018  1:20 PM Gina Wallace  [facility] [phone] [id number] Follow-up Instructions Return if symptoms worsen or fail to improve, for ER for transport. .  
  
Your Appointments 5/24/2018  9:10 AM  
ROUTINE CARE with Gina Wallace MD  
[address] [facility] ([address]) Appt Note: 4 month fu  
 [address] [address]  
[place] [address] [place] [id number] Upcoming Health Maintenance Date Due  
 GLAUCOMA SCREENING Q2Y 7/7/2010 EYE EXAM RETINAL OR DILATED Q1 11/12/2014 FOBT Q 1 YEAR AGE 50-75 2/24/2016 BREAST CANCER SCRN MAMMOGRAM 4/14/2016 MICROALBUMIN Q1 4/25/2018 DTaP/Tdap/Td series (1 - Tdap) 4/24/2018* MEDICARE YEARLY EXAM 6/2/2018* HEMOGLOBIN A1C Q6M 7/24/2018 FOOT EXAM Q1 1/24/2019 LIPID PANEL Q1 1/24/2019 *Topic was postponed. The date shown is not the original due date. Allergies as of 4/13/2018  Review Complete On: 4/13/2018 By: Gina Wallace MD  
  
 Severity Noted Reaction Type Reactions Bactrim [Sulfamethoxazole-trimethoprim]  06/27/2013    Nausea Only Current Immunizations  Reviewed on 10/13/2016 Name Date Influenza High Dose Vaccine PF 10/2/2017 Influenza Vaccine (Quad) PF 10/13/2016, 2/9/2016, 9/25/2014 Influenza Vaccine Split 10/16/2012 Pneumococcal Conjugate (PCV-13) 10/24/2017 Pneumococcal Polysaccharide (PPSV-23) 10/22/2013 Not reviewed this visit You Were Diagnosed With   
  
 Codes Comments Pain and swelling of left lower leg    -  Primary ICD-10-CM: M79.662, M79.89 ICD-9-CM: 729.5, 729.81 Vitals BP Pulse Temp Resp Height(growth percentile) Weight(growth percentile) 175/89 87 98 °F (36.7 °C) (Oral) 18 5' 2\" (1.575 m) 198 lb (89.8 kg) SpO2 BMI OB Status Smoking Status 99% 36.21 kg/m2 Postmenopausal Former Smoker Vitals History BMI and BSA Data Body Mass Index Body Surface Area  
 36.21 kg/m 2 1.98 m 2 Preferred Pharmacy Pharmacy Name Phone [address] [place], [place] - [address] [phone] Your Updated Medication List  
  
   
This list is accurate as of 4/13/18  1:42 PM.  Always use your most recent med list.  
  
  
  
  
 ARIPiprazole 15 mg tablet Commonly known as:  ABILIFY Take 1 Tab by mouth daily. benztropine 0.5 mg tablet Commonly known as:  COGENTIN Take 1 Tab by mouth two (2) times a day. Cholecalciferol (Vitamin D3) 2,000 unit Cap capsule Commonly known as:  VITAMIN D3 Take 2,000 Units by mouth daily. Indications: Vitamin D Deficiency  
  
 diclofenac EC 75 mg EC tablet Commonly known as:  VOLTAREN  
TAKE ONE TABLET BY MOUTH TWICE DAILY AS NEEDED  
  
 hydroCHLOROthiazide 12.5 mg tablet Commonly known as:  HYDRODIURIL Take 2 Tabs by mouth daily. hydrOXYzine HCl 25 mg tablet Commonly known as:  ATARAX Take 1 Tab by mouth two (2) times a day. LATUDA 80 mg Tab tablet Generic drug:  lurasidone Take 1 Tab by mouth two (2) times a day. lisinopril 20 mg tablet Commonly known as:  Varun Paul Take 1 Tab by mouth daily. OLANZapine 10 mg tablet Commonly known as:  ZyPREXA Take 10 mg by mouth nightly. Omeprazole delayed release 20 mg tablet Commonly known as:  PRILOSEC D/R Take 1 Tab by mouth daily. Indications: PREVENTION OF NSAID-INDUCED GASTRIC ULCER Follow-up Instructions Return if symptoms worsen or fail to improve, for ER for transport. Lola Leonard To-Do List   
 04/13/2018 Imaging:  DUPLEX LOWER EXT VENOUS BILAT 04/14/2018 9:30 AM  
  Appointment with VAS ROOM 1 [facility] at [facility] VASCULAR LAB ([phone]) No Prep  GENERAL INSTRUCTIONS 1. [facility] films/reports pertaining to the area being studied with you on the day of appointment. 2. A written order with a valid diagnosis and Physicians signature is required for all scheduled tests. 3. Check in at registration 30 minutes before your appointment time unless you were instructed to do otherwise. Patient Instructions Concern for a blood clot in lower left leg. Please go to an ER for testing. Learning About Deep Vein Thrombosis What is deep vein thrombosis? A deep vein thrombosis (DVT) is a blood clot in certain veins of the legs, pelvis, or arms. The clot is usually in the legs. DVT may damage the vein and cause the area to ache, swell, and change color. DVT also can lead to sores. DVT in these veins needs to be treated because the clots can get bigger, break loose, and travel through the bloodstream to the lungs. A blood clot in a lung can cause death. Blood clots can form in the veins when you are not active for a long period of time. For example, they can form if you need to stay in bed because of a health problem or must sit for a long time on an airplane or in a car. Surgery or an injury can damage your blood vessels and cause a clot to form. Cancer also can cause DVT. And some people have blood that clots too easily, which is a problem that may run in families. A risk factor is something that makes you more likely to develop a disease. Here are some major risk factors for DVT: 
· You have surgery. · You have to stay in bed for more than 3 days (such as in the hospital). · Your blood is likely to clot because of an injury, cancer, or inherited condition. Here are some minor risk factors for DVT: 
· You take birth control hormones. · You are pregnant. · You are in a car or airplane for a long trip. What are the symptoms? Symptoms of DVT may include: · Swelling in the affected area. · Redness and warmth in the affected area. · Pain or tenderness. You may have pain only when you touch the affected area or when you stand or walk. If your doctor thinks you may have DVT, you will probably have an ultrasound test. You may have other tests as well. How can you prevent DVT? · Exercise your lower leg muscles to help blood flow in your legs. Point your toes up toward your head so the calves of your legs are stretched, then relax and repeat. This is a good exercise to do when you are sitting for long periods of time. · Get out of bed as soon as you can after an illness or surgery. If you need to stay in bed, do the leg exercise noted above every hour when you are awake. · Use special stockings called compression stockings. These stockings are tight at the feet with a gradually looser fit on the leg. Many doctors recommend that you wear compression stockings during a journey longer than 8 hours. · Take breaks when you are on long trips. Stop the car and walk around. On long airplane flights, walk up and down the aisle hourly, flex and point your feet every 20 minutes while sitting, and drink plenty of water. · Take blood-thinning medicines after some types of surgery if your doctor recommends it. Blood thinners also may be used if you are likely to develop clots. How is DVT treated? Treatment for DVT usually involves taking blood thinners. These medicines are given through a vein (intravenously, or IV) or as a pill. Talk with your doctor about which medicine is right for you. Your doctor also may suggest that you prop up or elevate your leg when possible, take walks, and wear compression stockings. These measures may help reduce the pain and swelling that can happen with DVT. Follow-up care is a key part of your treatment and safety.  Be sure to make and go to all appointments, and call your doctor if you are having problems. It's also a good idea to know your test results and keep a list of the medicines you take. Where can you learn more? Go to http://florin-kevin.info/. Enter F751 in the search box to learn more about \"Learning About Deep Vein Thrombosis. \" Current as of: March 20, 2017 Content Version: 11.4 © 1905-7276 [employer]. Care instructions adapted under license by [employer] (which disclaims liability or warranty for this information). If you have questions about a medical condition or this instruction, always ask your healthcare professional. [address] any warranty or liability for your use of this information. Introducing [facility]! [facility] introduces [facility] patient portal. Now you can access parts of your medical record, email your doctor's office, and request medication refills online. 1. In your internet browser, go to [email or web address]. [facility]/[employer] 2. Click on the First Time User? Click Here link in the Sign In box. You will see the New Member Sign Up page. 3. Enter your [facility] Access Code exactly as it appears below. You will not need to use this code after youve completed the sign-up process. If you do not sign up before the expiration date, you must request a new code. · [facility] Access Code: [email or web address] Expires: 4/24/2018  9:52 AM 
 
4. Enter the last four digits of your Social Security Number (xxxx) and Date of Birth (mm/dd/yyyy) as indicated and click Submit. You will be taken to the next sign-up page. 5. Create a [employer] ID. This will be your [facility] login ID and cannot be changed, so think of one that is secure and easy to remember. 6. Create a [facility] password. You can change your password at any time. 7. Enter your Password Reset Question and Answer.  This can be used at a later time if you forget your password. 8. Enter your e-mail address. You will receive e-mail notification when new information is available in [address] 9. Click Sign Up. You can now view and download portions of your medical record. 10. Click the Download Summary menu link to download a portable copy of your medical information. If you have questions, please visit the Frequently Asked Questions section of the [employer] website. Remember, [employer] is NOT to be used for urgent needs. For medical emergencies, dial 911. Now available from your iPhone and Android! Please provide this summary of care documentation to your next provider. Your primary care clinician is listed as Ronel Evans. If you have any questions after today's visit, please call [phone].

## 2018-04-18 ENCOUNTER — OFFICE VISIT (OUTPATIENT)
Dept: FAMILY MEDICINE CLINIC | Age: 73
End: 2018-04-18

## 2018-04-18 VITALS
WEIGHT: 195 LBS | OXYGEN SATURATION: 97 % | RESPIRATION RATE: 18 BRPM | SYSTOLIC BLOOD PRESSURE: 149 MMHG | TEMPERATURE: 98.1 F | DIASTOLIC BLOOD PRESSURE: 82 MMHG | HEART RATE: 79 BPM | BODY MASS INDEX: 35.88 KG/M2 | HEIGHT: 62 IN

## 2018-04-18 DIAGNOSIS — M25.562 ACUTE PAIN OF LEFT KNEE: Primary | ICD-10-CM

## 2018-04-18 DIAGNOSIS — G89.29 BILATERAL CHRONIC KNEE PAIN: ICD-10-CM

## 2018-04-18 DIAGNOSIS — I10 ESSENTIAL HYPERTENSION: ICD-10-CM

## 2018-04-18 DIAGNOSIS — M25.562 BILATERAL CHRONIC KNEE PAIN: ICD-10-CM

## 2018-04-18 DIAGNOSIS — M25.561 BILATERAL CHRONIC KNEE PAIN: ICD-10-CM

## 2018-04-18 RX ORDER — LISINOPRIL 20 MG/1
40 TABLET ORAL DAILY
Qty: 90 TAB | Refills: 2
Start: 2018-04-18 | End: 2018-05-24 | Stop reason: DRUGHIGH

## 2018-04-18 RX ORDER — COLCHICINE 0.6 MG/1
TABLET ORAL
Qty: 12 TAB | Refills: 0 | Status: SHIPPED | OUTPATIENT
Start: 2018-04-18 | End: 2018-06-11

## 2018-04-18 RX ORDER — DICLOFENAC SODIUM 75 MG/1
TABLET, DELAYED RELEASE ORAL
Qty: 60 TAB | Refills: 0 | Status: SHIPPED | OUTPATIENT
Start: 2018-04-18 | End: 2018-05-09 | Stop reason: SDUPTHER

## 2018-04-18 NOTE — PROGRESS NOTES
Dakota Boateng  67 y.o. female  1945  [address] [facility]  [id number]     [facility]: Progress Note       Encounter Date: 4/18/2018    Chief Complaint   Patient presents with   [facility] Follow Up    Leg Pain     left leg        History provided by patient and daughter  History of Present Illness   Dakota Boateng is a 67 y.o. female who presents to clinic today for:    Left Lower leg pain  Patient present with cc of left lower leg pain > 1 week. Patient has been seen at [facility] on 4/13/2018 for same issue and was recommended to follow up with ER in order to test for DVT. Records from [facility] showed a normal xray of left tibia/fibula and US doppler that was negative for DVT. Today patient reports that pain has continued. Daughter report that yesterday after keeping leg elevated it was back to normal and swelling resolved. However after walking around swelling returned. Patient has been taking diclofenac. Daughter is questioning if patient is having a gout flare. Reports that she had it many years int he past but nothing recent. Hypertension: Uncontrolled   BP Readings from Last 3 Encounters:   04/18/18 149/82   04/13/18 175/89   03/23/18 158/79     The patient reports:  not taking medications regularly as instructed, no medication side effects noted, no TIA's, no chest pain on exertion, no dyspnea on exertion, no swelling of ankles.      Sodium   Date Value Ref Range Status   03/23/2018 138 134 - 144 mmol/L Final     Potassium   Date Value Ref Range Status   03/23/2018 4.3 3.5 - 5.2 mmol/L Final     Chloride   Date Value Ref Range Status   03/23/2018 100 96 - 106 mmol/L Final     Creatinine   Date Value Ref Range Status   03/23/2018 0.90 0.57 - 1.00 mg/dL Final   02/01/2018 1.10 (H) 0.57 - 1.00 mg/dL Final   01/24/2018 1.06 (H) 0.57 - 1.00 mg/dL Final     GFR est AA   Date Value Ref Range Status   03/23/2018 74 >59 mL/min/1.73 Final 02/01/2018 58 (L) >59 mL/min/1.73 Final   01/24/2018 61 >59 mL/min/1.73 Final     GFR est non-AA   Date Value Ref Range Status   03/23/2018 64 >59 mL/min/1.73 Final   02/01/2018 50 (L) >59 mL/min/1.73 Final   01/24/2018 53 (L) >59 mL/min/1.73 Final       Our goal is to normalize the blood pressure to decrease the risks of strokes and heart attacks. The patient is in agreement with the plan. Review of Systems   Review of Systems   Constitutional: Negative for chills, diaphoresis and fever. Eyes: Negative for blurred vision, double vision, pain and discharge. Cardiovascular: Positive for leg swelling (left leg only). Negative for chest pain. Gastrointestinal: Negative for abdominal pain, constipation, diarrhea, nausea and vomiting. Genitourinary: Negative for dysuria and urgency. Skin: Negative for itching and rash. Neurological: Negative for dizziness, weakness and headaches. Vitals/Objective:     Vitals:    04/18/18 0852 04/18/18 0914   BP: 186/88 149/82   Pulse: 79    Resp: 18    Temp: 98.1 °F (36.7 °C)    TempSrc: Oral    SpO2: 97%    Weight: 195 lb (88.5 kg)    Height: 5' 2\" (1.575 m)      Body mass index is 35.67 kg/(m^2). Wt Readings from Last 3 Encounters:   04/18/18 195 lb (88.5 kg)   04/13/18 198 lb (89.8 kg)   03/23/18 193 lb (87.5 kg)       Physical Exam   Constitutional: She is oriented to person, place, and time. She appears well-developed and well-nourished. HENT:   Head: Normocephalic and atraumatic. Cardiovascular: Normal rate and regular rhythm. Pulmonary/Chest: Effort normal and breath sounds normal.   Musculoskeletal: She exhibits edema (left leg non pitting edema +1 to mid calf) and tenderness (and warmth to left knee joint). Neurological: She is alert and oriented to person, place, and time. Skin: Skin is warm and dry. No results found for this or any previous visit (from the past 24 hour(s)).   Assessment and Plan:     Encounter Diagnoses ICD-10-CM ICD-9-CM   1. Acute pain of left knee M25.562 719.46   2. Essential hypertension I10 401.9   3. Bilateral chronic knee pain M25.561 719.46    M25.562 338.29    G89.29        1. Acute pain of left knee  Will do trial of colchicine as NSAIDs have not stopped pain at this time. Check level of ruic acid after current episode resolves. - colchicine 0.6 mg tablet; 1.2 mg (2 tablets) ORALLY at the first sign of a flare followed by 0.6 mg one hr later; MAX 1.8 mg over 1 hour  Dispense: 12 Tab; Refill: 0  - diclofenac EC (VOLTAREN) 75 mg EC tablet; TAKE ONE TABLET BY MOUTH TWICE DAILY AS NEEDED  Dispense: 60 Tab; Refill: 0    2. Essential hypertension  Increase lisinopril to 40 mg and check labs at next visit. Even when patient is complaint with medication BP is still over 150.   - lisinopril (PRINIVIL, ZESTRIL) 20 mg tablet; Take 2 Tabs by mouth daily. Dispense: 90 Tab; Refill: 2    3. Bilateral chronic knee pain  - diclofenac EC (VOLTAREN) 75 mg EC tablet; TAKE ONE TABLET BY MOUTH TWICE DAILY AS NEEDED  Dispense: 60 Tab; Refill: 0    I have discussed the diagnosis with the patient and the intended plan as seen in the above orders. she has expressed understanding. The patient has received an after-visit summary and questions were answered concerning future plans. I have discussed medication side effects and warnings with the patient as well. Electronically Signed: Maryuri Boyle MD     History/Allergies   Patients past medical, surgical and family histories were reviewed and updated. Past Medical History:   Diagnosis Date    Hypertension     Psychotic disorder       Past Surgical History:   Procedure Laterality Date    HX GYN       No family history on file. Social History     Social History    Marital status:      Spouse name: N/A    Number of children: N/A    Years of education: N/A     Occupational History    Not on file.      Social History Main Topics    Smoking status: Former Smoker    Smokeless tobacco: Never Used    Alcohol use No    Drug use: No    Sexual activity: Not on file     Other Topics Concern    Not on file     Social History Narrative         Allergies   Allergen Reactions    Bactrim [Sulfamethoxazole-Trimethoprim] Nausea Only       Disposition     Follow-up Disposition:  Return in about 3 weeks (around 5/9/2018) for Routine/BP. Please bring all medication to next visit. .    Future Appointments  Date Time Provider Cl[place]is Jackson   5/24/2018 9:10 AM Lalo Gomez MD [facility] SCHED            Current Medications after this visit     Current Outpatient Prescriptions   Medication Sig    colchicine 0.6 mg tablet 1.2 mg (2 tablets) ORALLY at the first sign of a flare followed by 0.6 mg one hr later; MAX 1.8 mg over 1 hour    lisinopril (PRINIVIL, ZESTRIL) 20 mg tablet Take 2 Tabs by mouth daily.  diclofenac EC (VOLTAREN) 75 mg EC tablet TAKE ONE TABLET BY MOUTH TWICE DAILY AS NEEDED    Omeprazole delayed release (PRILOSEC D/R) 20 mg tablet Take 1 Tab by mouth daily. Indications: PREVENTION OF NSAID-INDUCED GASTRIC ULCER    lurasidone (LATUDA) 80 mg tab tablet Take 1 Tab by mouth two (2) times a day.  ARIPiprazole (ABILIFY) 15 mg tablet Take 1 Tab by mouth daily.  hydroCHLOROthiazide (HYDRODIURIL) 12.5 mg tablet Take 2 Tabs by mouth daily.  hydrOXYzine HCl (ATARAX) 25 mg tablet Take 1 Tab by mouth two (2) times a day.  benztropine (COGENTIN) 0.5 mg tablet Take 1 Tab by mouth two (2) times a day.  Cholecalciferol, Vitamin D3, (VITAMIN D3) 2,000 unit cap capsule Take 2,000 Units by mouth daily. Indications: Vitamin D Deficiency    OLANZapine (ZYPREXA) 10 mg tablet Take 10 mg by mouth nightly. No current facility-administered medications for this visit.       Medications Discontinued During This Encounter   Medication Reason    lisinopril (PRINIVIL, ZESTRIL) 20 mg tablet Reorder    diclofenac EC (VOLTAREN) 75 mg EC tablet Reorder

## 2018-04-18 NOTE — PATIENT INSTRUCTIONS
For gout, colchicine take 2 tabs as soon as your get medication. And then take 1 tab daily until symptoms are gone. Purine-Restricted Diet: Care Instructions  Your Care Instructions    Purines are substances that are found in some foods. Your body turns purines into uric acid. High levels of uric acid can cause gout, which is a form of arthritis that causes pain and inflammation in joints. You may be able to help control the amount of uric acid in your body by limiting high-purine foods in your diet. Follow-up care is a key part of your treatment and safety. Be sure to make and go to all appointments, and call your doctor if you are having problems. It's also a good idea to know your test results and keep a list of the medicines you take. How can you care for yourself at home? · Plan your meals and snacks around foods that are low in purines and are safe for you to eat. These foods include:  ¨ Green vegetables and tomatoes. ¨ Fruits. ¨ Whole-grain breads, rice, and cereals. ¨ Eggs, peanut butter, and nuts. ¨ Low-fat milk, cheese, and other milk products. ¨ Popcorn. ¨ Gelatin desserts, chocolate, cocoa, and cakes and sweets, in small amounts. · You can eat certain foods that are medium-high in purines, but eat them only once in a while. These foods include:  ¨ Legumes, such as dried beans and dried peas. You can have 1 cup cooked legumes each day. ¨ Asparagus, cauliflower, spinach, mushrooms, and green peas. ¨ Fish and seafood (other than very high-purine seafood). ¨ Oatmeal, wheat bran, and wheat germ. · Limit very high-purine foods, including:  ¨ Organ meats, such as liver, kidneys, sweetbreads, and brains. ¨ Meats, including hubbard, beef, pork, and lamb. ¨ Game meats and any other meats in large amounts. ¨ Anchovies, sardines, herring, mackerel, and scallops. ¨ Gravy. ¨ Beer. Where can you learn more? Go to http://florin-kevin.info/.   Enter F448 in the search box to learn more about \"Purine-Restricted Diet: Care Instructions. \"  Current as of: May 12, 2017  Content Version: 11.4  © 5163-0102 Healthwise, [employer]. Care instructions adapted under license by [employer] (which disclaims liability or warranty for this information). If you have questions about a medical condition or this instruction, always ask your healthcare professional. [address] any warranty or liability for your use of this information.

## 2018-04-18 NOTE — MR AVS SNAPSHOT
Brendamaria de jesus De La Rosa 
 
 
 [address] [address] 
[phone] Patient: Elizabeth Campos MRN: [id number] ZNI:[birth year] Visit Information Date & Time Provider Department Dept. Phone Encounter #  
 4/18/2018  9:10 AM Minda Bar MD [address] [facility] [id number] Follow-up Instructions Return in about 3 weeks (around 5/9/2018) for Routine/BP. Please bring all medication to next visit. .  
  
Your Appointments 5/24/2018  9:10 AM  
ROUTINE CARE with Minda Bar MD  
[address] [facility] ([address]) Appt Note: 4 month fu  
 [address] [facility] [address] [address]  
[place] [address] [place] [id number] Upcoming Health Maintenance Date Due  
 GLAUCOMA SCREENING Q2Y 7/7/2010 EYE EXAM RETINAL OR DILATED Q1 11/12/2014 FOBT Q 1 YEAR AGE 50-75 2/24/2016 BREAST CANCER SCRN MAMMOGRAM 4/14/2016 MICROALBUMIN Q1 4/25/2018 DTaP/Tdap/Td series (1 - Tdap) 4/24/2018* MEDICARE YEARLY EXAM 6/2/2018* HEMOGLOBIN A1C Q6M 7/24/2018 FOOT EXAM Q1 1/24/2019 LIPID PANEL Q1 1/24/2019 *Topic was postponed. The date shown is not the original due date. Allergies as of 4/18/2018  Review Complete On: 4/18/2018 By: Minda Bar MD  
  
 Severity Noted Reaction Type Reactions Bactrim [Sulfamethoxazole-trimethoprim]  06/27/2013    Nausea Only Current Immunizations  Reviewed on 10/13/2016 Name Date Influenza High Dose Vaccine PF 10/2/2017 Influenza Vaccine (Quad) PF 10/13/2016, 2/9/2016, 9/25/2014 Influenza Vaccine Split 10/16/2012 Pneumococcal Conjugate (PCV-13) 10/24/2017 Pneumococcal Polysaccharide (PPSV-23) 10/22/2013 Not reviewed this visit You Were Diagnosed With   
  
 Codes Comments Acute pain of left knee    -  Primary ICD-10-CM: P43.392 ICD-9-CM: 719.46   
 Essential hypertension     ICD-10-CM: I10 
ICD-9-CM: 401.9 Bilateral chronic knee pain     ICD-10-CM: M25.561, M25.562, G89.29 ICD-9-CM: 719.46, 338.29 Vitals BP Pulse Temp Resp Height(growth percentile) Weight(growth percentile) 186/88 (BP 1 Location: Left arm, BP Patient Position: Sitting) 79 98.1 °F (36.7 °C) (Oral) 18 5' 2\" (1.575 m) 195 lb (88.5 kg) SpO2 BMI OB Status Smoking Status 97% 35.67 kg/m2 Postmenopausal Former Smoker Vitals History BMI and BSA Data Body Mass Index Body Surface Area  
 35.67 kg/m 2 1.97 m 2 Preferred Pharmacy Pharmacy Name Phone [address] [facility] [place] [address] [phone] Your Updated Medication List  
  
   
This list is accurate as of 4/18/18  9:13 AM.  Always use your most recent med list.  
  
  
  
  
 ARIPiprazole 15 mg tablet Commonly known as:  ABILIFY Take 1 Tab by mouth daily. benztropine 0.5 mg tablet Commonly known as:  COGENTIN Take 1 Tab by mouth two (2) times a day. Cholecalciferol (Vitamin D3) 2,000 unit Cap capsule Commonly known as:  VITAMIN D3 Take 2,000 Units by mouth daily. Indications: Vitamin D Deficiency  
  
 colchicine 0.6 mg tablet 1.2 mg (2 tablets) ORALLY at the first sign of a flare followed by 0.6 mg one hr later; MAX 1.8 mg over 1 hour  
  
 diclofenac EC 75 mg EC tablet Commonly known as:  VOLTAREN  
TAKE ONE TABLET BY MOUTH TWICE DAILY AS NEEDED  
  
 hydroCHLOROthiazide 12.5 mg tablet Commonly known as:  HYDRODIURIL Take 2 Tabs by mouth daily. hydrOXYzine HCl 25 mg tablet Commonly known as:  ATARAX Take 1 Tab by mouth two (2) times a day. LATUDA 80 mg Tab tablet Generic drug:  lurasidone Take 1 Tab by mouth two (2) times a day. lisinopril 20 mg tablet Commonly known as:  Basilio Human Take 2 Tabs by mouth daily. OLANZapine 10 mg tablet Commonly known as:  ZyPREXA  
 Take 10 mg by mouth nightly. Omeprazole delayed release 20 mg tablet Commonly known as:  PRILOSEC D/R Take 1 Tab by mouth daily. Indications: PREVENTION OF NSAID-INDUCED GASTRIC ULCER Prescriptions Sent to Pharmacy Refills  
 colchicine 0.6 mg tablet 0 Si.2 mg (2 tablets) ORALLY at the first sign of a flare followed by 0.6 mg one hr later; MAX 1.8 mg over 1 hour Class: Normal  
 Pharmacy: [address] [place] [address] #: [phone]  
 diclofenac EC (VOLTAREN) 75 mg EC tablet 0 Sig: TAKE ONE TABLET BY MOUTH TWICE DAILY AS NEEDED Class: Normal  
 Pharmacy: [address] [place] [address] #: [phone] Follow-up Instructions Return in about 3 weeks (around 2018) for Routine/BP. Please bring all medication to next visit. .  
  
  
Patient Instructions For gout, colchicine take 2 tabs as soon as your get medication. And then take 1 tab daily until symptoms are gone. Purine-Restricted Diet: Care Instructions Your Care Instructions Purines are substances that are found in some foods. Your body turns purines into uric acid. High levels of uric acid can cause gout, which is a form of arthritis that causes pain and inflammation in joints. You may be able to help control the amount of uric acid in your body by limiting high-purine foods in your diet. Follow-up care is a key part of your treatment and safety. Be sure to make and go to all appointments, and call your doctor if you are having problems. It's also a good idea to know your test results and keep a list of the medicines you take. How can you care for yourself at home? · Plan your meals and snacks around foods that are low in purines and are safe for you to eat. These foods include: ¨ Green vegetables and tomatoes. ¨ Fruits. ¨ Whole-grain breads, rice, and cereals. ¨ Eggs, peanut butter, and nuts. ¨ Low-fat milk, cheese, and other milk products. ¨ Popcorn. ¨ Gelatin desserts, chocolate, cocoa, and cakes and sweets, in small amounts. · You can eat certain foods that are medium-high in purines, but eat them only once in a while. These foods include: ¨ Legumes, such as dried beans and dried peas. You can have 1 cup cooked legumes each day. ¨ Asparagus, cauliflower, spinach, mushrooms, and green peas. ¨ Fish and seafood (other than very high-purine seafood). ¨ Oatmeal, wheat bran, and wheat germ. · Limit very high-purine foods, including: ¨ Organ meats, such as liver, kidneys, sweetbreads, and brains. ¨ Meats, including hubbard, beef, pork, and lamb. ¨ Game meats and any other meats in large amounts. ¨ Anchovies, sardines, herring, mackerel, and scallops. ¨ Gravy. ¨ Beer. Where can you learn more? Go to http://florin"[employer]Web[employer]kevin.info/. Enter F448 in the search box to learn more about \"Purine-Restricted Diet: Care Instructions. \" Current as of: May 12, 2017 Content Version: 11.4 © 1453-7988 HedgeChat[employer]. Care instructions adapted under license by [employer] (which disclaims liability or warranty for this information). If you have questions about a medical condition or this instruction, always ask your healthcare professional. [place] [address] any warranty or liability for your use of this information. Introducing [facility]! [facility] introduces Axial patient portal. Now you can access parts of your medical record, email your doctor's office, and request medication refills online. 1. In your internet browser, go to [email or web address] 2. Click on the First Time User? Click Here link in the Sign In box. You will see the New Member Sign Up page. 3. Enter your Axial Access Code exactly as it appears below. You will not need to use this code after youve completed the sign-up process.  If you do not sign up before the expiration date, you must request a new code. · userADgents Access Code: [email or web address] Expires: 4/24/2018  9:52 AM 
 
4. Enter the last four digits of your Social Security Number (xxxx) and Date of Birth (mm/dd/yyyy) as indicated and click Submit. You will be taken to the next sign-up page. 5. Create a userADgents ID. This will be your userADgents login ID and cannot be changed, so think of one that is secure and easy to remember. 6. Create a userADgents password. You can change your password at any time. 7. Enter your Password Reset Question and Answer. This can be used at a later time if you forget your password. 8. Enter your e-mail address. You will receive e-mail notification when new information is available in [address] 9. Click Sign Up. You can now view and download portions of your medical record. 10. Click the Download Summary menu link to download a portable copy of your medical information. If you have questions, please visit the Frequently Asked Questions section of the userADgents website. Remember, userADgents is NOT to be used for urgent needs. For medical emergencies, dial 911. Now available from your iPhone and Android! Please provide this summary of care documentation to your next provider. Your primary care clinician is listed as Modesta Lewis. If you have any questions after today's visit, please call [phone].

## 2018-04-18 NOTE — PROGRESS NOTES
Reviewed record in preparation for visit and have necessary documentation  Pt did not bring medication to office visit for review    Goals that were addressed and/or need to be completed during or after this appointment include   Health Maintenance Due   Topic Date Due    GLAUCOMA SCREENING Q2Y  07/07/2010    EYE EXAM RETINAL OR DILATED Q1  11/12/2014    FOBT Q 1 YEAR AGE 50-75  02/24/2016    BREAST CANCER SCRN MAMMOGRAM  04/14/2016    MICROALBUMIN Q1  04/25/2018

## 2018-05-01 ENCOUNTER — TELEPHONE (OUTPATIENT)
Dept: FAMILY MEDICINE CLINIC | Age: 73
End: 2018-05-01

## 2018-05-01 NOTE — TELEPHONE ENCOUNTER
Pt called stating that she has fluid in left knee. She wants to know if she can be prescribed some fluid pills. She states that she is unable to come in because she can barely walk.

## 2018-05-02 NOTE — TELEPHONE ENCOUNTER
Pt called stating that she has fluid in left knee. She wants to know if she can be prescribed some fluid pills. She states that she is unable to come in because she can barely walk. Painful and stiff. It is sitting out. Pt states she talked about it at last appointment and she is no better.

## 2018-05-04 ENCOUNTER — OFFICE VISIT (OUTPATIENT)
Dept: FAMILY MEDICINE CLINIC | Age: 73
End: 2018-05-04

## 2018-05-04 VITALS
RESPIRATION RATE: 16 BRPM | BODY MASS INDEX: 35.7 KG/M2 | OXYGEN SATURATION: 99 % | SYSTOLIC BLOOD PRESSURE: 133 MMHG | HEIGHT: 62 IN | WEIGHT: 194 LBS | HEART RATE: 76 BPM | TEMPERATURE: 97.5 F | DIASTOLIC BLOOD PRESSURE: 57 MMHG

## 2018-05-04 DIAGNOSIS — M25.562 CHRONIC PAIN OF LEFT KNEE: Primary | ICD-10-CM

## 2018-05-04 DIAGNOSIS — R60.0 LOCALIZED EDEMA: ICD-10-CM

## 2018-05-04 DIAGNOSIS — G89.29 CHRONIC PAIN OF LEFT KNEE: Primary | ICD-10-CM

## 2018-05-04 RX ORDER — LIDOCAINE 4 G/100G
1 PATCH TOPICAL EVERY 12 HOURS
Qty: 1 PACKAGE | Refills: 2 | Status: SHIPPED | OUTPATIENT
Start: 2018-05-04 | End: 2018-06-11

## 2018-05-04 RX ORDER — FUROSEMIDE 20 MG/1
20 TABLET ORAL 2 TIMES DAILY
Qty: 6 TAB | Refills: 0 | Status: SHIPPED | OUTPATIENT
Start: 2018-05-04 | End: 2018-05-07

## 2018-05-04 NOTE — PROGRESS NOTES
Keo Wilkerson  67 y.o. female  1945  [address] [facility]  [id number]     [facility]: Progress Note       Encounter Date: 5/4/2018    Chief Complaint   Patient presents with    Knee Swelling     requesting \"fluid pill\"       History provided by patient  History of Present Illness   Keo Wilkerson is a 67 y.o. female who presents to clinic today for:    Left Lower leg pain  Patient present with cc of left lower leg pain > 1 week. Patient has been seen at [facility] on 4/13/2018 for same issue and was recommended to follow up with ER in order to test for DVT. Records from [facility], [facility] showed a normal xray of left tibia/fibula and US doppler that was negative for DVT. ON 4/18 attempted treatment with colchicine for possible gout. Today patient reports that pain was improved slightly by colchicine but still present. Swelling in legs has improved slightly but is still present      Review of Systems   Review of Systems   Constitutional: Negative for diaphoresis. Eyes: Negative for discharge. Cardiovascular: Positive for leg swelling (left leg only). Negative for chest pain and palpitations. Musculoskeletal: Positive for joint pain. Negative for back pain, falls and myalgias. Skin: Negative for itching and rash. Neurological: Negative for dizziness, tingling, sensory change, focal weakness, weakness and headaches. Vitals/Objective:     Vitals:    05/04/18 0812   BP: 133/57   Pulse: 76   Resp: 16   Temp: 97.5 °F (36.4 °C)   TempSrc: Oral   SpO2: 99%   Weight: 194 lb (88 kg)   Height: 5' 2\" (1.575 m)     Body mass index is 35.48 kg/(m^2). Wt Readings from Last 3 Encounters:   05/04/18 194 lb (88 kg)   04/18/18 195 lb (88.5 kg)   04/13/18 198 lb (89.8 kg)       Physical Exam   Constitutional: She is oriented to person, place, and time. She appears well-developed and well-nourished. HENT:   Head: Normocephalic and atraumatic. Cardiovascular: Normal rate and regular rhythm. Pulmonary/Chest: Effort normal and breath sounds normal.   Musculoskeletal: She exhibits edema (left leg non pitting edema +1 to mid calf) and tenderness. Neurological: She is alert and oriented to person, place, and time. Skin: Skin is warm and dry. Musculoskeletal:  Knee: left  Knee Effusion: None  Quadriceps atrophy: None   Popliteal (Bakers) Cyst: Negative   Patellofemoral crepitus: Negative     ROM:  Flexion: 130  Extension: 0   Hip IR/ER: FROM without pain    Palpation:   Patella tenderness: None  Patellar tendon tenderness: None  Quad tendon tenderness: None  Medial joint line tenderness: Present  Lateral joint line tenderness: None  MCL tenderness: None  LCL tenderness: None  Medial facet tenderness: None  Lateral facet tenderness: None  Condyle tenderness: None  Tibia tubercle tenderness: None  Proximal fibula tenderness: None  Plica tenderness: None  Prepatellar bursa tenderness: None  Pes bursa tenderness: None  ITB tenderness: None         Strength (0-5/5):   Flexion: Left: 5/5    Right: 5/5    Extension: Left: 5/5    Right: 5/5    Hip abduction: 5/5    Hip adduction: 5/5            No results found for this or any previous visit (from the past 24 hour(s)). Assessment and Plan:     Encounter Diagnoses     ICD-10-CM ICD-9-CM   1. Chronic pain of left knee M25.562 719.46    G89.29 338.29   2. Localized edema R60.0 782.3       1. Chronic pain of left knee  Discussed with patient that pain is likely from arthritis and it is not something that we can \"cure\" but rather treat the symptoms of.   - lidocaine (SALONPAS/ASPERCREME) 4 % patch; 1 Patch by TransDERmal route every twelve (12) hours every twelve (12) hours. Dispense: 1 Package; Refill: 2    2. Localized edema  - furosemide (LASIX) 20 mg tablet; Take 1 Tab by mouth two (2) times a day for 3 days. Indications: Edema  Dispense: 6 Tab;  Refill: 0    I have discussed the diagnosis with the patient and the intended plan as seen in the above orders. she has expressed understanding. The patient has received an after-visit summary and questions were answered concerning future plans. I have discussed medication side effects and warnings with the patient as well. Electronically Signed: Kang Dowd MD     History/Allergies   Patients past medical, surgical and family histories were reviewed and updated. Past Medical History:   Diagnosis Date    Hypertension     Psychotic disorder       Past Surgical History:   Procedure Laterality Date    HX GYN       No family history on file. Social History     Social History    Marital status:      Spouse name: N/A    Number of children: N/A    Years of education: N/A     Occupational History    Not on file. Social History Main Topics    Smoking status: Former Smoker    Smokeless tobacco: Never Used    Alcohol use No    Drug use: No    Sexual activity: Not on file     Other Topics Concern    Not on file     Social History Narrative         Allergies   Allergen Reactions    Bactrim [Sulfamethoxazole-Trimethoprim] Nausea Only       Disposition     Follow-up Disposition:  Return if symptoms worsen or fail to improve. Future Appointments  Date Time Provider [place] Renetta   5/24/2018 9:10 AM Kang Dowd MD [facility] SCHED            Current Medications after this visit     Current Outpatient Prescriptions   Medication Sig    furosemide (LASIX) 20 mg tablet Take 1 Tab by mouth two (2) times a day for 3 days. Indications: Edema    lidocaine (SALONPAS/ASPERCREME) 4 % patch 1 Patch by TransDERmal route every twelve (12) hours every twelve (12) hours.  colchicine 0.6 mg tablet 1.2 mg (2 tablets) ORALLY at the first sign of a flare followed by 0.6 mg one hr later; MAX 1.8 mg over 1 hour    lisinopril (PRINIVIL, ZESTRIL) 20 mg tablet Take 2 Tabs by mouth daily.     diclofenac EC (VOLTAREN) 75 mg EC tablet TAKE ONE TABLET BY MOUTH TWICE DAILY AS NEEDED    Omeprazole delayed release (PRILOSEC D/R) 20 mg tablet Take 1 Tab by mouth daily. Indications: PREVENTION OF NSAID-INDUCED GASTRIC ULCER    lurasidone (LATUDA) 80 mg tab tablet Take 1 Tab by mouth two (2) times a day.  ARIPiprazole (ABILIFY) 15 mg tablet Take 1 Tab by mouth daily.  hydroCHLOROthiazide (HYDRODIURIL) 12.5 mg tablet Take 2 Tabs by mouth daily.  hydrOXYzine HCl (ATARAX) 25 mg tablet Take 1 Tab by mouth two (2) times a day.  benztropine (COGENTIN) 0.5 mg tablet Take 1 Tab by mouth two (2) times a day.  Cholecalciferol, Vitamin D3, (VITAMIN D3) 2,000 unit cap capsule Take 2,000 Units by mouth daily. Indications: Vitamin D Deficiency    OLANZapine (ZYPREXA) 10 mg tablet Take 10 mg by mouth nightly. No current facility-administered medications for this visit. There are no discontinued medications.

## 2018-05-04 NOTE — MR AVS SNAPSHOT
[address] [facility] 
 
 
 [address] [address] 
[phone] Patient: Giovanni Slater MRN: [id number] Visit Information Date & Time Provider Department Dept. Phone Encounter #  
 5/4/2018  8:20 AM Rick Jacobs MD [address] [facility] [id number] Follow-up Instructions Return if symptoms worsen or fail to improve. Your Appointments 5/24/2018  9:10 AM  
ROUTINE CARE with Rick Jacobs MD  
[address] [facility] ([facility]) Appt Note: 4 month fu  
 [address] [facility] [address] [address]  
[place] [address] [place] [id number] Upcoming Health Maintenance Date Due DTaP/Tdap/Td series (1 - Tdap) 7/7/1966 GLAUCOMA SCREENING Q2Y 7/7/2010 EYE EXAM RETINAL OR DILATED Q1 11/12/2014 FOBT Q 1 YEAR AGE 50-75 2/24/2016 BREAST CANCER SCRN MAMMOGRAM 4/14/2016 MICROALBUMIN Q1 4/25/2018 MEDICARE YEARLY EXAM 6/2/2018* HEMOGLOBIN A1C Q6M 7/24/2018 Influenza Age 5 to Adult 8/1/2018 FOOT EXAM Q1 1/24/2019 LIPID PANEL Q1 1/24/2019 *Topic was postponed. The date shown is not the original due date. Allergies as of 5/4/2018  Review Complete On: 5/4/2018 By: Rick Jacobs MD  
  
 Severity Noted Reaction Type Reactions Bactrim [Sulfamethoxazole-trimethoprim]  06/27/2013    Nausea Only Current Immunizations  Reviewed on 10/13/2016 Name Date Influenza High Dose Vaccine PF 10/2/2017 Influenza Vaccine (Quad) PF 10/13/2016, 2/9/2016, 9/25/2014 Influenza Vaccine Split 10/16/2012 Pneumococcal Conjugate (PCV-13) 10/24/2017 Pneumococcal Polysaccharide (PPSV-23) 10/22/2013 Not reviewed this visit You Were Diagnosed With   
  
 Codes Comments Chronic pain of left knee    -  Primary ICD-10-CM: M25.562, B64.54 ICD-9-CM: 719.46, 338.29   
 Localized edema     ICD-10-CM: R60.0 ICD-9-CM: 290. 3 Vitals BP Pulse Temp Resp Height(growth percentile) Weight(growth percentile) 133/57 76 97.5 °F (36.4 °C) (Oral) 16 5' 2\" (1.575 m) 194 lb (88 kg) SpO2 BMI OB Status Smoking Status 99% 35.48 kg/m2 Postmenopausal Former Smoker Vitals History BMI and BSA Data Body Mass Index Body Surface Area  
 35.48 kg/m 2 1.96 m 2 Preferred Pharmacy [facility]acy Name [facility] [address] [facility] [phone] Your Updated Medication List  
  
   
This list is accurate as of 5/4/18  8:27 AM.  Always use your most recent med list.  
  
  
  
  
 ARIPiprazole 15 mg tablet Commonly known as:  ABILIFY Take 1 Tab by mouth daily. benztropine 0.5 mg tablet Commonly known as:  COGENTIN Take 1 Tab by mouth two (2) times a day. Cholecalciferol (Vitamin D3) 2,000 unit Cap capsule Commonly known as:  VITAMIN D3 Take 2,000 Units by mouth daily. Indications: Vitamin D Deficiency  
  
 colchicine 0.6 mg tablet 1.2 mg (2 tablets) ORALLY at the first sign of a flare followed by 0.6 mg one hr later; MAX 1.8 mg over 1 hour  
  
 diclofenac EC 75 mg EC tablet Commonly known as:  VOLTAREN  
TAKE ONE TABLET BY MOUTH TWICE DAILY AS NEEDED  
  
 furosemide 20 mg tablet Commonly known as:  LASIX Take 1 Tab by mouth two (2) times a day for 3 days. Indications: Edema  
  
 hydroCHLOROthiazide 12.5 mg tablet Commonly known as:  HYDRODIURIL Take 2 Tabs by mouth daily. hydrOXYzine HCl 25 mg tablet Commonly known as:  ATARAX Take 1 Tab by mouth two (2) times a day. LATUDA 80 mg Tab tablet Generic drug:  lurasidone Take 1 Tab by mouth two (2) times a day. lidocaine 4 % patch Commonly known as:  SALONPAS/ASPERCREME  
1 Patch by TransDERmal route every twelve (12) hours every twelve (12) hours. lisinopril 20 mg tablet Commonly known as:  Caitie Vuonger Take 2 Tabs by mouth daily. OLANZapine 10 mg tablet Commonly known as:  ZyPREXA Take 10 mg by mouth nightly. Omeprazole delayed release 20 mg tablet Commonly known as:  PRILOSEC D/R Take 1 Tab by mouth daily. Indications: PREVENTION OF NSAID-INDUCED GASTRIC ULCER Prescriptions Sent to Pharmacy Refills  
 furosemide (LASIX) 20 mg tablet 0 Sig: Take 1 Tab by mouth two (2) times a day for 3 days. Indications: Edema Class: Normal  
 Pharmacy: [address] [place] [address] #: [phone] Route: Oral  
 lidocaine (SALONPAS/ASPERCREME) 4 % patch 2 Si Patch by TransDERmal route every twelve (12) hours every twelve (12) hours. Class: Normal  
 Pharmacy: [address] [place] [address] #: [phone] Route: TransDERmal  
  
Follow-up Instructions Return if symptoms worsen or fail to improve. Patient Instructions Arthritis: Care Instructions Your Care Instructions Arthritis, also called osteoarthritis, is a breakdown of the cartilage that cushions your joints. When the cartilage wears down, your bones rub against each other. This causes pain and stiffness. Many people have some arthritis as they age. Arthritis most often affects the joints of the spine, hands, hips, knees, or feet. You can take simple measures to protect your joints, ease your pain, and help you stay active. Follow-up care is a key part of your treatment and safety. Be sure to make and go to all appointments, and call your doctor if you are having problems. It's also a good idea to know your test results and keep a list of the medicines you take. How can you care for yourself at home? · Stay at a healthy weight. Being overweight puts extra strain on your joints. · Talk to your doctor or physical therapist about exercises that will help ease joint pain. ¨ Stretch. You may enjoy gentle forms of yoga to help keep your joints and muscles flexible. ¨ Walk instead of jog. Other types of exercise that are less stressful on the joints include riding a bicycle, swimming, fred chi, or water exercise. ¨ Lift weights. Strong muscles help reduce stress on your joints. Stronger thigh muscles, for example, take some of the stress off of the knees and hips. Learn the right way to lift weights so you do not make joint pain worse. · Take your medicines exactly as prescribed. Call your doctor if you think you are having a problem with your medicine. · Take pain medicines exactly as directed. ¨ If the doctor gave you a prescription medicine for pain, take it as prescribed. ¨ If you are not taking a prescription pain medicine, ask your doctor if you can take an over-the-counter medicine. · Use a cane, crutch, walker, or another device if you need help to get around. These can help rest your joints. You also can use other things to make life easier, such as a higher toilet seat and padded handles on kitchen utensils. · Do not sit in low chairs, which can make it hard to get up. · Put heat or cold on your sore joints as needed. Use whichever helps you most. You also can take turns with hot and cold packs. ¨ Apply heat 2 or 3 times a day for 20 to 30 minutes-using a heating pad, hot shower, or hot pack-to relieve pain and stiffness. ¨ Put ice or a cold pack on your sore joint for 10 to 20 minutes at a time. Put a thin cloth between the ice and your skin. When should you call for help? Call your doctor now or seek immediate medical care if: 
? · You have sudden swelling, warmth, or pain in any joint. ? · You have joint pain and a fever or rash. ? · You have such bad pain that you cannot use a joint. ? Watch closely for changes in your health, and be sure to contact your doctor if: 
? · You have mild joint symptoms that continue even with more than 6 weeks of care at home. ? · You have stomach pain or other problems with your medicine. Where can you learn more? Go to http://florin-kevin.info/. Enter G605 in the search box to learn more about \"Arthritis: Care Instructions. \" Current as of: October 31, 2016 Content Version: 11.4 © 1656-8475 [employer]. Care instructions adapted under license by [employer] (which disclaims liability or warranty for this information). If you have questions about a medical condition or this instruction, always ask your healthcare professional. [address] any warranty or liability for your use of this information. Introducing [facility]! Gloria Collado introduces Thumb Arcade patient portal. Now you can access parts of your medical record, email your doctor's office, and request medication refills online. 1. In your internet browser, go to [email or web address] 2. Click on the First Time User? Click Here link in the Sign In box. You will see the New Member Sign Up page. 3. Enter your Thumb Arcade Access Code exactly as it appears below. You will not need to use this code after youve completed the sign-up process. If you do not sign up before the expiration date, you must request a new code. · Thumb Arcade Access Code: [email or web address] Expires: 8/2/2018  8:27 AM 
 
4. Enter the last four digits of your Social Security Number (xxxx) and Date of Birth (mm/dd/yyyy) as indicated and click Submit. You will be taken to the next sign-up page. 5. Create a Remark Mediat ID. This will be your Thumb Arcade login ID and cannot be changed, so think of one that is secure and easy to remember. 6. Create a Thumb Arcade password. You can change your password at any time. 7. Enter your Password Reset Question and Answer. This can be used at a later time if you forget your password. 8. Enter your e-mail address.  You will receive e-mail notification when new information is available in IRI. 9. Click Sign Up. You can now view and download portions of your medical record. 10. Click the Download Summary menu link to download a portable copy of your medical information. If you have questions, please visit the Frequently Asked Questions section of the IRI website. Remember, IRI is NOT to be used for urgent needs. For medical emergencies, dial 911. Now available from your iPhone and Android! Please provide this summary of care documentation to your next provider. Your primary care clinician is listed as Maryuri Boyle. If you have any questions after today's visit, please call [phone].

## 2018-05-04 NOTE — PATIENT INSTRUCTIONS
Arthritis: Care Instructions  Your Care Instructions  Arthritis, also called osteoarthritis, is a breakdown of the cartilage that cushions your joints. When the cartilage wears down, your bones rub against each other. This causes pain and stiffness. Many people have some arthritis as they age. Arthritis most often affects the joints of the spine, hands, hips, knees, or feet. You can take simple measures to protect your joints, ease your pain, and help you stay active. Follow-up care is a key part of your treatment and safety. Be sure to make and go to all appointments, and call your doctor if you are having problems. It's also a good idea to know your test results and keep a list of the medicines you take. How can you care for yourself at home? · Stay at a healthy weight. Being overweight puts extra strain on your joints. · Talk to your doctor or physical therapist about exercises that will help ease joint pain. ¨ Stretch. You may enjoy gentle forms of yoga to help keep your joints and muscles flexible. ¨ Walk instead of jog. Other types of exercise that are less stressful on the joints include riding a bicycle, swimming, fred chi, or water exercise. ¨ Lift weights. Strong muscles help reduce stress on your joints. Stronger thigh muscles, for example, take some of the stress off of the knees and hips. Learn the right way to lift weights so you do not make joint pain worse. · Take your medicines exactly as prescribed. Call your doctor if you think you are having a problem with your medicine. · Take pain medicines exactly as directed. ¨ If the doctor gave you a prescription medicine for pain, take it as prescribed. ¨ If you are not taking a prescription pain medicine, ask your doctor if you can take an over-the-counter medicine. · Use a cane, crutch, walker, or another device if you need help to get around. These can help rest your joints.  You also can use other things to make life easier, such as a higher toilet seat and padded handles on kitchen utensils. · Do not sit in low chairs, which can make it hard to get up. · Put heat or cold on your sore joints as needed. Use whichever helps you most. You also can take turns with hot and cold packs. ¨ Apply heat 2 or 3 times a day for 20 to 30 minutes-using a heating pad, hot shower, or hot pack-to relieve pain and stiffness. ¨ Put ice or a cold pack on your sore joint for 10 to 20 minutes at a time. Put a thin cloth between the ice and your skin. When should you call for help? Call your doctor now or seek immediate medical care if:  ? · You have sudden swelling, warmth, or pain in any joint. ? · You have joint pain and a fever or rash. ? · You have such bad pain that you cannot use a joint. ? Watch closely for changes in your health, and be sure to contact your doctor if:  ? · You have mild joint symptoms that continue even with more than 6 weeks of care at home. ? · You have stomach pain or other problems with your medicine. Where can you learn more? Go to http://florin-kevin.info/. Enter W262 in the search box to learn more about \"Arthritis: Care Instructions. \"  Current as of: October 31, 2016  Content Version: 11.4  © 8020-2957 [employer]. Care instructions adapted under license by [employer] (which disclaims liability or warranty for this information). If you have questions about a medical condition or this instruction, always ask your healthcare professional. [place] [address] any warranty or liability for your use of this information.

## 2018-05-04 NOTE — PROGRESS NOTES
1. Have you been to the ER, urgent care clinic, or been hospitalized since your last visit? 2. Have you seen or consulted any other health care providers outside of the [address] [place] [address] since your last visit? Include any pap smears or colon screening.     Reviewed record in preparation for visit and have necessary documentation  opportunity was given for questions  Goals that were addressed and/or need to be completed during or after this appointment include     Health Maintenance Due   Topic Date Due    DTaP/Tdap/Td series (1 - Tdap) 07/07/1966    GLAUCOMA SCREENING Q2Y  07/07/2010    EYE EXAM RETINAL OR DILATED Q1  11/12/2014    FOBT Q 1 YEAR AGE 50-75  02/24/2016    BREAST CANCER SCRN MAMMOGRAM  04/14/2016    MICROALBUMIN Q1  04/25/2018

## 2018-05-09 DIAGNOSIS — M25.562 ACUTE PAIN OF LEFT KNEE: ICD-10-CM

## 2018-05-09 DIAGNOSIS — G89.29 BILATERAL CHRONIC KNEE PAIN: ICD-10-CM

## 2018-05-09 DIAGNOSIS — M25.562 BILATERAL CHRONIC KNEE PAIN: ICD-10-CM

## 2018-05-09 DIAGNOSIS — M25.561 BILATERAL CHRONIC KNEE PAIN: ICD-10-CM

## 2018-05-09 RX ORDER — DICLOFENAC SODIUM 75 MG/1
TABLET, DELAYED RELEASE ORAL
Qty: 60 TAB | Refills: 0 | Status: SHIPPED | OUTPATIENT
Start: 2018-05-09 | End: 2018-06-13 | Stop reason: SDUPTHER

## 2018-05-24 ENCOUNTER — OFFICE VISIT (OUTPATIENT)
Dept: FAMILY MEDICINE CLINIC | Age: 73
End: 2018-05-24

## 2018-05-24 VITALS
HEIGHT: 62 IN | HEART RATE: 72 BPM | WEIGHT: 195.2 LBS | BODY MASS INDEX: 35.92 KG/M2 | SYSTOLIC BLOOD PRESSURE: 165 MMHG | DIASTOLIC BLOOD PRESSURE: 69 MMHG | OXYGEN SATURATION: 99 % | TEMPERATURE: 97.9 F | RESPIRATION RATE: 20 BRPM

## 2018-05-24 DIAGNOSIS — Z12.11 SCREEN FOR COLON CANCER: ICD-10-CM

## 2018-05-24 DIAGNOSIS — E66.01 SEVERE OBESITY (BMI 35.0-39.9) WITH COMORBIDITY (HCC): ICD-10-CM

## 2018-05-24 DIAGNOSIS — Z12.31 ENCOUNTER FOR SCREENING MAMMOGRAM FOR BREAST CANCER: ICD-10-CM

## 2018-05-24 DIAGNOSIS — E11.9 DIABETES MELLITUS TYPE 2, DIET-CONTROLLED (HCC): ICD-10-CM

## 2018-05-24 DIAGNOSIS — I10 ESSENTIAL HYPERTENSION: Primary | ICD-10-CM

## 2018-05-24 DIAGNOSIS — F20.3 UNDIFFERENTIATED SCHIZOPHRENIA (HCC): ICD-10-CM

## 2018-05-24 DIAGNOSIS — M17.12 PRIMARY OSTEOARTHRITIS OF LEFT KNEE: ICD-10-CM

## 2018-05-24 RX ORDER — TRIAMCINOLONE ACETONIDE 40 MG/ML
40 INJECTION, SUSPENSION INTRA-ARTICULAR; INTRAMUSCULAR ONCE
Qty: 1 ML | Refills: 0
Start: 2018-05-24 | End: 2018-05-24

## 2018-05-24 RX ORDER — LISINOPRIL 40 MG/1
40 TABLET ORAL DAILY
Qty: 90 TAB | Refills: 1 | Status: SHIPPED | OUTPATIENT
Start: 2018-05-24 | End: 2018-08-27 | Stop reason: SDUPTHER

## 2018-05-24 RX ORDER — LIDOCAINE HYDROCHLORIDE 10 MG/ML
1 INJECTION INFILTRATION; PERINEURAL ONCE
Qty: 1 VIAL | Refills: 0
Start: 2018-05-24 | End: 2018-05-24

## 2018-05-24 NOTE — PATIENT INSTRUCTIONS
Joint Injections: Care Instructions  Your Care Instructions  Joint injections are shots into a joint, such as the knee. They may be used to put in medicines, such as pain relievers. Or they can be used to take out fluid. Sometimes the fluid is tested in a lab. This can help find the cause of a joint problem. A corticosteroid, or steroid, shot is used to reduce inflammation in tendons or joints. It is often used to treat problems such as arthritis, tendinitis, and bursitis. Steroids can be injected directly into a painful, inflamed joint. They can also help reduce inflammation of a bursa. A bursa is a sac of fluid. It cushions and lubricates areas where tendons, ligaments, skin, muscles, or bones rub against each other. A steroid shot can sometimes help with short-term pain relief when other treatments haven't worked. If steroid shots help, pain may improve for weeks or months. Follow-up care is a key part of your treatment and safety. Be sure to make and go to all appointments, and call your doctor if you are having problems. It's also a good idea to know your test results and keep a list of the medicines you take. How can you care for yourself at home? · Put ice or a cold pack on the area for 10 to 20 minutes at a time. Put a thin cloth between the ice and your skin. · Take anti-inflammatory medicines to reduce pain, swelling, or inflammation. These include ibuprofen (Advil, Motrin) and naproxen (Aleve). Read and follow all instructions on the label. · Avoid strenuous activities for several days, especially those that put stress on the area where you got the shot. · If you have dressings over the area, keep them clean and dry. You may remove them when your doctor tells you to. When should you call for help? Call your doctor now or seek immediate medical care if:  ? · You have signs of infection, such as:  ¨ Increased pain, swelling, warmth, or redness. ¨ Red streaks leading from the site.   ¨ Pus draining from the site. ¨ A fever. ? Watch closely for changes in your health, and be sure to contact your doctor if you have any problems. Where can you learn more? Go to http://florin-kevin.info/. Enter N616 in the search box to learn more about \"Joint Injections: Care Instructions. \"  Current as of: March 21, 2017  Content Version: 11.4  © 3369-8033 [employer]. Care instructions adapted under license by [employer] (which disclaims liability or warranty for this information). If you have questions about a medical condition or this instruction, always ask your healthcare professional. [address] any warranty or liability for your use of this information.

## 2018-05-24 NOTE — PROGRESS NOTES
Health Maintenance Due   Topic Date Due    DTaP/Tdap/Td series (1 - Tdap) 07/07/1966    GLAUCOMA SCREENING Q2Y  07/07/2010    EYE EXAM RETINAL OR DILATED Q1  11/12/2014    FOBT Q 1 YEAR AGE 50-75  02/24/2016    BREAST CANCER SCRN MAMMOGRAM  04/14/2016    MICROALBUMIN Q1  04/25/2018     Body mass index is 35.7 kg/(m^2). 1. Have you been to the ER, urgent care clinic since your last visit? Hospitalized since your last visit? No    2. Have you seen or consulted any other health care providers outside of the [address] [place] [address] since your last visit? Include any pap smears or colon screening. No  Reviewed record in preparation for visit and have necessary documentation  Pt did not bring medication to office visit for review  Information was given to pt on Advanced Directives, Living Will  Information was given on Shingles Vaccine  opportunity was given for questions  Goals that were addressed and/or need to be completed during or after this appointment include       - check for functional glucose monitor and record keeping system  Pt was given BS record log to document home readings and return to office for review  Diabetic Bundle:  LDL-99  A1C-5.8  BP-  Smoking?no  Anticoagulation medication? Eye exam dilated?   Foot exam?

## 2018-05-24 NOTE — MR AVS SNAPSHOT
[address] [address] 
[phone] Patient: Jessica Winchester MRN: [id number] FHO:[birth year] Visit Information Date & Time Provider Department Dept. Phone Encounter #  
 5/24/2018  9:10 AM Jimmy Felton MD [address] [place] [address] [id number] Follow-up Instructions Return in about 4 months (around 9/24/2018) for Routine. Sarai Krishnan Upcoming Health Maintenance Date Due DTaP/Tdap/Td series (1 - Tdap) 7/7/1966 GLAUCOMA SCREENING Q2Y 7/7/2010 EYE EXAM RETINAL OR DILATED Q1 11/12/2014 FOBT Q 1 YEAR AGE 50-75 2/24/2016 BREAST CANCER SCRN MAMMOGRAM 4/14/2016 MICROALBUMIN Q1 4/25/2018 MEDICARE YEARLY EXAM 6/2/2018* HEMOGLOBIN A1C Q6M 7/24/2018 Influenza Age 5 to Adult 8/1/2018 FOOT EXAM Q1 1/24/2019 LIPID PANEL Q1 1/24/2019 *Topic was postponed. The date shown is not the original due date. Allergies as of 5/24/2018  Review Complete On: 5/24/2018 By: Keo Servin Severity Noted Reaction Type Reactions Bactrim [Sulfamethoxazole-trimethoprim]  06/27/2013    Nausea Only Current Immunizations  Reviewed on 10/13/2016 Name Date Influenza High Dose Vaccine PF 10/2/2017 Influenza Vaccine (Quad) PF 10/13/2016, 2/9/2016, 9/25/2014 Influenza Vaccine Split 10/16/2012 Pneumococcal Conjugate (PCV-13) 10/24/2017 Pneumococcal Polysaccharide (PPSV-23) 10/22/2013 Not reviewed this visit You Were Diagnosed With   
  
 Codes Comments Diabetes mellitus type 2, diet-controlled ([facility]    -  Primary ICD-10-CM: E11.9 ICD-9-CM: 250.00 Essential hypertension     ICD-10-CM: I10 
ICD-9-CM: 401.9 Undifferentiated schizophrenia ([facility]     ICD-10-CM: F20.3 ICD-9-CM: 295.90 Severe obesity (BMI 35.0-39. 9) with comorbidity ([facility]     ICD-10-CM: E66.01 
ICD-9-CM: 278.01   
 Encounter for screening mammogram for breast cancer     ICD-10-CM: Z12.31 
ICD-9-CM: V76.12 Primary osteoarthritis of left knee     ICD-10-CM: M17.12 
ICD-9-CM: 715.16 Screen for colon cancer     ICD-10-CM: Z12.11 ICD-9-CM: V76.51 Vitals BP Pulse Temp Resp Height(growth percentile) Weight(growth percentile)  
 165/69 72 97.9 °F (36.6 °C) (Oral) 20 5' 2\" (1.575 m) 195 lb 3.2 oz (88.5 kg) SpO2 BMI OB Status Smoking Status 99% 35.7 kg/m2 Postmenopausal Former Smoker Vitals History BMI and BSA Data Body Mass Index Body Surface Area 35.7 kg/m 2 1.97 m 2 Preferred Pharmacy Pharmacy Name Phone [address] [place], [place] - [address] [phone] Your Updated Medication List  
  
   
This list is accurate as of 5/24/18  9:45 AM.  Always use your most recent med list.  
  
  
  
  
 ARIPiprazole 15 mg tablet Commonly known as:  ABILIFY Take 1 Tab by mouth daily. benztropine 0.5 mg tablet Commonly known as:  COGENTIN Take 1 Tab by mouth two (2) times a day. Cholecalciferol (Vitamin D3) 2,000 unit Cap capsule Commonly known as:  VITAMIN D3 Take 2,000 Units by mouth daily. Indications: Vitamin D Deficiency  
  
 colchicine 0.6 mg tablet 1.2 mg (2 tablets) ORALLY at the first sign of a flare followed by 0.6 mg one hr later; MAX 1.8 mg over 1 hour  
  
 diclofenac EC 75 mg EC tablet Commonly known as:  VOLTAREN  
TAKE ONE TABLET BY MOUTH TWICE DAILY AS NEEDED  
  
 hydroCHLOROthiazide 12.5 mg tablet Commonly known as:  HYDRODIURIL Take 2 Tabs by mouth daily. hydrOXYzine HCl 25 mg tablet Commonly known as:  ATARAX Take 1 Tab by mouth two (2) times a day. LATUDA 80 mg Tab tablet Generic drug:  lurasidone Take 1 Tab by mouth two (2) times a day. lidocaine 10 mg/mL (1 %) injection Commonly known as:  XYLOCAINE  
1 mL by IntraDERMal route once for 1 dose. lidocaine 4 % patch Commonly known as:  SALONPAS/ASPERCREME  
1 Patch by TransDERmal route every twelve (12) hours every twelve (12) hours. lisinopril 40 mg tablet Commonly known as:  Shanika [place] Take 1 Tab by mouth daily. Indications: High Blood Pressure OLANZapine 10 mg tablet Commonly known as:  ZyPREXA Take 10 mg by mouth nightly. Omeprazole delayed release 20 mg tablet Commonly known as:  PRILOSEC D/R Take 1 Tab by mouth daily. Indications: PREVENTION OF NSAID-INDUCED GASTRIC ULCER  
  
 triamcinolone acetonide 40 mg/mL injection Commonly known as:  KENALOG  
1 mL by Intra artICUlar route once for 1 dose. Prescriptions Sent to Pharmacy Refills  
 lisinopril (PRINIVIL, ZESTRIL) 40 mg tablet 1 Sig: Take 1 Tab by mouth daily. Indications: High Blood Pressure Class: Normal  
 Pharmacy: [address] [place] [address] #: [phone] Route: Oral  
  
We Performed the Following HEMOGLOBIN A1C WITH EAG [63174 CPT(R)] LIPID PANEL [95712 CPT(R)] METABOLIC PANEL, COMPREHENSIVE [95746 CPT(R)] MICROALBUMIN, UR, RAND W/ MICROALB/CREAT RATIO T0131177 CPT(R)] OCCULT BLOOD, IMMUNOASSAY (FIT) [id number] CPT(R)] RI DRAIN/INJECT LARGE JOINT/BURSA [id number] CPT(R)] TRIAMCINOLONE ACETONIDE INJ [ [facility]] Follow-up Instructions Return in about 4 months (around 9/24/2018) for Routine. Dilia Loose To-Do List   
 05/24/2018 Imaging:  DYLAN MAMMO BI SCREENING INCL CAD Patient Instructions Joint Injections: Care Instructions Your Care Instructions Joint injections are shots into a joint, such as the knee. They may be used to put in medicines, such as pain relievers. Or they can be used to take out fluid. Sometimes the fluid is tested in a lab. This can help find the cause of a joint problem.  
A corticosteroid, or steroid, shot is used to reduce inflammation in tendons or joints. It is often used to treat problems such as arthritis, tendinitis, and bursitis. Steroids can be injected directly into a painful, inflamed joint. They can also help reduce inflammation of a bursa. A bursa is a sac of fluid. It cushions and lubricates areas where tendons, ligaments, skin, muscles, or bones rub against each other. A steroid shot can sometimes help with short-term pain relief when other treatments haven't worked. If steroid shots help, pain may improve for weeks or months. Follow-up care is a key part of your treatment and safety. Be sure to make and go to all appointments, and call your doctor if you are having problems. It's also a good idea to know your test results and keep a list of the medicines you take. How can you care for yourself at home? · Put ice or a cold pack on the area for 10 to 20 minutes at a time. Put a thin cloth between the ice and your skin. · Take anti-inflammatory medicines to reduce pain, swelling, or inflammation. These include ibuprofen (Advil, Motrin) and naproxen (Aleve). Read and follow all instructions on the label. · Avoid strenuous activities for several days, especially those that put stress on the area where you got the shot. · If you have dressings over the area, keep them clean and dry. You may remove them when your doctor tells you to. When should you call for help? Call your doctor now or seek immediate medical care if: 
? · You have signs of infection, such as: 
¨ Increased pain, swelling, warmth, or redness. ¨ Red streaks leading from the site. ¨ Pus draining from the site. ¨ A fever. ? Watch closely for changes in your health, and be sure to contact your doctor if you have any problems. Where can you learn more? Go to http://florin-kevin.info/. Enter N616 in the search box to learn more about \"Joint Injections: Care Instructions. \" Current as of: March 21, 2017 Content Version: 11.4 © 2134-8056 [employer]. Care instructions adapted under license by [employer] (which disclaims liability or warranty for this information). If you have questions about a medical condition or this instruction, always ask your healthcare professional. [address] any warranty or liability for your use of this information. Introducing [address]! [employer] introduces Dely patient portal. Now you can access parts of your medical record, email your doctor's office, and request medication refills online. 1. In your internet browser, go to https://[employer] [facility]/[employer] 2. Click on the First Time User? Click Here link in the Sign In box. You will see the New Member Sign Up page. 3. Enter your Dely Access Code exactly as it appears below. You will not need to use this code after youve completed the sign-up process. If you do not sign up before the expiration date, you must request a new code. · Dely Access Code: [email or web address] Expires: 8/2/2018  8:27 AM 
 
4. Enter the last four digits of your Social Security Number (xxxx) and Date of Birth (mm/dd/yyyy) as indicated and click Submit. You will be taken to the next sign-up page. 5. Create a Dely ID. This will be your Dely login ID and cannot be changed, so think of one that is secure and easy to remember. 6. Create a Dely password. You can change your password at any time. 7. Enter your Password Reset Question and Answer. This can be used at a later time if you forget your password. 8. Enter your e-mail address. You will receive e-mail notification when new information is available in [address] 9. Click Sign Up. You can now view and download portions of your medical record. 10. Click the Download Summary menu link to download a portable copy of your medical information.  
 
If you have questions, please visit the Frequently Asked Questions section of the [employer]. Remember, [employer] is NOT to be used for urgent needs. For medical emergencies, dial 911. Now available from your iPhone and Android! Please provide this summary of care documentation to your next provider. Your primary care clinician is listed as Ros Echavarria. If you have any questions after today's visit, please call [phone].

## 2018-05-24 NOTE — PROGRESS NOTES
Maria Esther Olivares  67 y.o. female  1945  [address] [facility]  [id number]     [facility]: Progress Note       Encounter Date: 5/24/2018    Chief Complaint   Patient presents with    Hypertension    Diabetes    Labs    Knee Pain    Knee Swelling       History provided by patient  History of Present Illness   Maria Esther Olivares is a 67 y.o. female who presents to clinic today for:    Hypertension: Uncontrolled   BP Readings from Last 3 Encounters:   05/24/18 165/69   05/04/18 133/57   04/18/18 149/82     The patient reports:  taking medications as instructed, no medication side effects noted, no TIA's, no chest pain on exertion, no dyspnea on exertion, no swelling of ankles. Sodium   Date Value Ref Range Status   03/23/2018 138 134 - 144 mmol/L Final     Potassium   Date Value Ref Range Status   03/23/2018 4.3 3.5 - 5.2 mmol/L Final     Chloride   Date Value Ref Range Status   03/23/2018 100 96 - 106 mmol/L Final     Creatinine   Date Value Ref Range Status   03/23/2018 0.90 0.57 - 1.00 mg/dL Final   02/01/2018 1.10 (H) 0.57 - 1.00 mg/dL Final   01/24/2018 1.06 (H) 0.57 - 1.00 mg/dL Final     GFR est AA   Date Value Ref Range Status   03/23/2018 74 >59 mL/min/1.73 Final   02/01/2018 58 (L) >59 mL/min/1.73 Final   01/24/2018 61 >59 mL/min/1.73 Final     GFR est non-AA   Date Value Ref Range Status   03/23/2018 64 >59 mL/min/1.73 Final   02/01/2018 50 (L) >59 mL/min/1.73 Final   01/24/2018 53 (L) >59 mL/min/1.73 Final       Our goal is to normalize the blood pressure to decrease the risks of strokes and heart attacks. The patient is in agreement with the plan. Diabetes Follow up: Stable   Overall the patient feels she is diong well and diet controlled.     Diabetic Consultants:Endocrinologist - N/A   Therapy:diet   Medication compliance:n/a   Frequency of home glucose testing: N/A   Blood Sugar range at home: N/A   Polyuria, polyphagia or polydipsia: NO   Retinopathy: NO   Neuropathy SX: NO   Low blood sugar symptoms: No   Dietary compliance: Good   On ASA: No:    Pertinent Labs:      Hemoglobin A1c   Date Value Ref Range Status   01/24/2018 5.8 (H) 4.8 - 5.6 % Final     Comment:              Pre-diabetes: 5.7 - 6.4           Diabetes: >6.4           Glycemic control for adults with diabetes: <7.0     10/02/2017 5.3 4.8 - 5.6 % Final     Comment:              Pre-diabetes: 5.7 - 6.4           Diabetes: >6.4           Glycemic control for adults with diabetes: <7.0     04/25/2017 5.8 (H) 4.8 - 5.6 % Final     Comment:              Pre-diabetes: 5.7 - 6.4           Diabetes: >6.4           Glycemic control for adults with diabetes: <7.0       Estimated average glucose   Date Value Ref Range Status   01/24/2018 120 mg/dL Final   10/02/2017 105 mg/dL Final   04/25/2017 120 mg/dL Final     Cholesterol, total   Date Value Ref Range Status   01/24/2018 170 100 - 199 mg/dL Final     HDL Cholesterol   Date Value Ref Range Status   01/24/2018 59 >39 mg/dL Final        Wt Readings from Last 3 Encounters:   05/24/18 195 lb 3.2 oz (88.5 kg)   05/04/18 194 lb (88 kg)   04/18/18 195 lb (88.5 kg)        History   Smoking Status    Former Smoker   Smokeless Tobacco    Never Used          Hyperlipidemia:  Stable Patient is on high-risk medication for developing metabolic syndrome. Cardiovascular risks for her are: diabetic  hypertension  obese  sedentary lifestyle.    Currently she takes diet   Myalgias: No  Cholesterol, total   Date Value Ref Range Status   01/24/2018 170 100 - 199 mg/dL Final   10/02/2017 175 100 - 199 mg/dL Final   04/25/2017 173 100 - 199 mg/dL Final     HDL Cholesterol   Date Value Ref Range Status   01/24/2018 59 >39 mg/dL Final   10/02/2017 65 >39 mg/dL Final   04/25/2017 72 >39 mg/dL Final     Triglyceride   Date Value Ref Range Status   01/24/2018 60 0 - 149 mg/dL Final   10/02/2017 72 0 - 149 mg/dL Final   04/25/2017 55 0 - 149 mg/dL Final     ALT (SGPT)   Date Value Ref Range Status   04/25/2017 7 0 - 32 IU/L Final     AST (SGOT)   Date Value Ref Range Status   04/25/2017 14 0 - 40 IU/L Final     Alk. phosphatase   Date Value Ref Range Status   04/25/2017 55 39 - 117 IU/L Final       Wt Readings from Last 3 Encounters:   05/24/18 195 lb 3.2 oz (88.5 kg)   05/04/18 194 lb (88 kg)   04/18/18 195 lb (88.5 kg)     Knee pain, bilateral  Patient present with cc of bilateral (left > right)  knee pain worsen over the last 6 months. Pain is worse with movement. Pain is radiates up hip posteriorly bilaterally. She has been taking diclofenac for the pain with varying results. Has also been using lidocaine patches but is requesting knee injection today. States that it has been harder to get around and the last injection she had helped for months. Health Maintenance  Addressed     Health Maintenance Due   Topic Date Due    DTaP/Tdap/Td series (1 - Tdap) 07/07/1966    GLAUCOMA SCREENING Q2Y  07/07/2010    EYE EXAM RETINAL OR DILATED Q1  11/12/2014    FOBT Q 1 YEAR AGE 50-75  02/24/2016    BREAST CANCER SCRN MAMMOGRAM  04/14/2016    MICROALBUMIN Q1  04/25/2018     Review of Systems   Review of Systems   Constitutional: Negative for diaphoresis, fever and weight loss. Eyes: Negative for discharge and redness. Respiratory: Negative for cough, shortness of breath and wheezing. Cardiovascular: Negative for chest pain, palpitations and leg swelling. Gastrointestinal: Negative for abdominal pain, diarrhea, nausea and vomiting. Musculoskeletal: Positive for joint pain. Negative for back pain, falls and myalgias. Skin: Negative for itching and rash. Neurological: Negative for dizziness, tingling, sensory change, focal weakness, weakness and headaches.         Vitals/Objective:     Vitals:    05/24/18 0858   BP: 165/69   Pulse: 72   Resp: 20   Temp: 97.9 °F (36.6 °C)   TempSrc: Oral   SpO2: 99%   Weight: 195 lb 3.2 oz (88.5 kg)   Height: 5' 2\" (1.575 m)     Body mass index is 35.7 kg/(m^2). Wt Readings from Last 3 Encounters:   05/24/18 195 lb 3.2 oz (88.5 kg)   05/04/18 194 lb (88 kg)   04/18/18 195 lb (88.5 kg)       Physical Exam   Constitutional: She is oriented to person, place, and time. She appears well-developed and well-nourished. HENT:   Head: Normocephalic and atraumatic. Cardiovascular: Normal rate and regular rhythm. Pulmonary/Chest: Effort normal and breath sounds normal.   Musculoskeletal: She exhibits tenderness ( ). She exhibits no edema (left leg non pitting edema +1 to mid calf). Left knee: She exhibits decreased range of motion, effusion and deformity. She exhibits no erythema, no LCL laxity, normal patellar mobility, no bony tenderness, normal meniscus and no MCL laxity. Tenderness found. Medial joint line and lateral joint line tenderness noted. Neurological: She is alert and oriented to person, place, and time. Skin: Skin is warm and dry. No results found for this or any previous visit (from the past 24 hour(s)). Assessment and Plan:     Encounter Diagnoses     ICD-10-CM ICD-9-CM   1. Diabetes mellitus type 2, diet-controlled (HCC) E11.9 250.00   2. Essential hypertension I10 401.9   3. Undifferentiated schizophrenia ([facility] F20.3 295.90   4. Severe obesity (BMI 35.0-39. 9) with comorbidity ([facility] E66.01 278.01   5. Encounter for screening mammogram for breast cancer Z12.31 V76.12   6. Primary osteoarthritis of left knee M17.12 715.16   7. Screen for colon cancer Z12.11 V76.51       1. Essential hypertension  Elevation likely from pain. Typically very well controlled. No change at this time. - lisinopril (PRINIVIL, ZESTRIL) 40 mg tablet; Take 1 Tab by mouth daily. Indications: High Blood Pressure  Dispense: 90 Tab; Refill: 1  - METABOLIC PANEL, COMPREHENSIVE    2. Diabetes mellitus type 2, diet-controlled ([facility]  4. Severe obesity (BMI 35.0-39. 9) with comorbidity [facility])  Patient on high risk medication for schizophrenia.  Will continue to monitor, currently treated with diet only. - LIPID PANEL  - HEMOGLOBIN A1C WITH EAG  - MICROALBUMIN, UR, RAND W/ MICROALB/CREAT RATIO    3. Undifferentiated schizophrenia ([address]  Followed by [facility]    5. Encounter for screening mammogram for breast cancer  At [facility] per patient request.   - [facility] MAMMO BI SCREENING INCL CAD; Future    6. Primary osteoarthritis of left knee  - TRIAMCINOLONE ACETONIDE INJ  - triamcinolone acetonide (KENALOG) 40 mg/mL injection; 1 mL by Intra artICUlar route once for 1 dose. Dispense: 1 mL; Refill: 0  - lidocaine (XYLOCAINE) 10 mg/mL (1 %) injection; 1 mL by IntraDERMal route once for 1 dose. Dispense: 1 Vial; Refill: 0  - IA DRAIN/INJECT LARGE JOINT/BURSA  - 20610  - GRAM STAIN  - CELL COUNT, SYNOVIAL FLUID    7. Screen for colon cancer  - OCCULT BLOOD, IMMUNOASSAY (FIT)        I have discussed the diagnosis with the patient and the intended plan as seen in the above orders. she has expressed understanding. The patient has received an after-visit summary and questions were answered concerning future plans. I have discussed medication side effects and warnings with the patient as well. Electronically Signed: Ronel Evans MD     History/Allergies   Patients past medical, surgical and family histories were reviewed and updated. Past Medical History:   Diagnosis Date    Hypertension     Psychotic disorder       Past Surgical History:   Procedure Laterality Date    HX GYN       History reviewed. No pertinent family history. Social History     Social History    Marital status:      Spouse name: N/A    Number of children: N/A    Years of education: N/A     Occupational History    Not on file.      Social History Main Topics    Smoking status: Former Smoker    Smokeless tobacco: Never Used    Alcohol use No    Drug use: No    Sexual activity: Not on file     Other Topics Concern    Not on file     Social History Narrative         Allergies   Allergen Reactions    Bactrim [Sulfamethoxazole-Trimethoprim] Nausea Only       Disposition     Follow-up Disposition:  Return in about 4 months (around 9/24/2018) for Routine. .    No future appointments. Current Medications after this visit     Current Outpatient Prescriptions   Medication Sig    lisinopril (PRINIVIL, ZESTRIL) 40 mg tablet Take 1 Tab by mouth daily. Indications: High Blood Pressure    triamcinolone acetonide (KENALOG) 40 mg/mL injection 1 mL by Intra artICUlar route once for 1 dose.  lidocaine (XYLOCAINE) 10 mg/mL (1 %) injection 1 mL by IntraDERMal route once for 1 dose.  diclofenac EC (VOLTAREN) 75 mg EC tablet TAKE ONE TABLET BY MOUTH TWICE DAILY AS NEEDED    lidocaine (SALONPAS/ASPERCREME) 4 % patch 1 Patch by TransDERmal route every twelve (12) hours every twelve (12) hours.  colchicine 0.6 mg tablet 1.2 mg (2 tablets) ORALLY at the first sign of a flare followed by 0.6 mg one hr later; MAX 1.8 mg over 1 hour    Omeprazole delayed release (PRILOSEC D/R) 20 mg tablet Take 1 Tab by mouth daily. Indications: PREVENTION OF NSAID-INDUCED GASTRIC ULCER    lurasidone (LATUDA) 80 mg tab tablet Take 1 Tab by mouth two (2) times a day.  ARIPiprazole (ABILIFY) 15 mg tablet Take 1 Tab by mouth daily.  hydroCHLOROthiazide (HYDRODIURIL) 12.5 mg tablet Take 2 Tabs by mouth daily.  hydrOXYzine HCl (ATARAX) 25 mg tablet Take 1 Tab by mouth two (2) times a day.  benztropine (COGENTIN) 0.5 mg tablet Take 1 Tab by mouth two (2) times a day.  Cholecalciferol, Vitamin D3, (VITAMIN D3) 2,000 unit cap capsule Take 2,000 Units by mouth daily. Indications: Vitamin D Deficiency    OLANZapine (ZYPREXA) 10 mg tablet Take 10 mg by mouth nightly. No current facility-administered medications for this visit.       Medications Discontinued During This Encounter   Medication Reason    lisinopril (PRINIVIL, ZESTRIL) 20 mg tablet Dose Adjustment

## 2018-05-24 NOTE — PROGRESS NOTES
SHABNAM MATAMOROS Deo [facility], later [facility]  OFFICE PROCEDURE PROGRESS NOTE        Chart reviewed for the following:   Zachary Starks MD, have reviewed the History, Physical and updated the Allergic reactions for [address] performed immediately prior to start of procedure:   Zachary Starks MD, have performed the following reviews on Jaelyn Precise prior to the start of the procedure:            * Patient was identified by name and date of birth   * Agreement on procedure being performed was verified  * Risks and Benefits explained to the patient  * Procedure site verified and marked as necessary  * Patient was positioned for comfort  * Consent was signed and verified     Time: 0935      Date of procedure: 5/24/2018    Procedure performed by:  Jesse Connolly MD    Provider assisted by: Gal Lazo LPN    Patient assisted by: self    How tolerated by patient: tolerated the procedure well with no complications    Post Procedural Pain Scale: 0 - No Hurt    Comments: none      Procedure note:  After consent was obtained and a timeout to verify accuracy the left knee joint, lateral approach was prepped with alcohol and 3 swabs of betadine. Using sterile no-touch technique the joint space was entered and no blood returned. The bursa was aspirated with 18g needle removing 15 ml of clear reddish bursal fluid. One cc of 2% plain xylocaine, 1 cc of marcaine and 1 cc of Kenalog (40 mg) were mixed and injected. The procedure was well tolerated and the patient got immediate relief from pain. It was explained that pain may actually increase the day after the injection until the steroid takes effect. The patient knows to rest the joint for 2 days and call immediately if fever, redness or swelling of the joint occurs. Joint injections improve symptoms most but not all of the time.  Call back if symptoms persist.

## 2018-05-25 ENCOUNTER — TELEPHONE (OUTPATIENT)
Dept: FAMILY MEDICINE CLINIC | Age: 73
End: 2018-05-25

## 2018-05-25 DIAGNOSIS — E87.1 HYPONATREMIA: Primary | ICD-10-CM

## 2018-05-25 LAB
ALBUMIN SERPL-MCNC: 4.3 G/DL (ref 3.5–4.8)
ALBUMIN/CREAT UR: <16.5 MG/G CREAT (ref 0–30)
ALBUMIN/GLOB SERPL: 1.3 {RATIO} (ref 1.2–2.2)
ALP SERPL-CCNC: 63 IU/L (ref 39–117)
ALT SERPL-CCNC: 13 IU/L (ref 0–32)
APPEARANCE FLD: ABNORMAL
AST SERPL-CCNC: 15 IU/L (ref 0–40)
BILIRUB SERPL-MCNC: 0.4 MG/DL (ref 0–1.2)
BUN SERPL-MCNC: 19 MG/DL (ref 8–27)
BUN/CREAT SERPL: 19 (ref 12–28)
CALCIUM SERPL-MCNC: 9.3 MG/DL (ref 8.7–10.3)
CHLORIDE SERPL-SCNC: 92 MMOL/L (ref 96–106)
CHOLEST SERPL-MCNC: 180 MG/DL (ref 100–199)
CIL COLUMNAR LINING CELLS FLD: 0 %
CO2 SERPL-SCNC: 20 MMOL/L (ref 18–29)
COLOR FLD: ABNORMAL
CREAT SERPL-MCNC: 1 MG/DL (ref 0.57–1)
CREAT UR-MCNC: 18.2 MG/DL
CRYSTALS FLD MICRO: ABNORMAL
EOSINOPHIL NFR FLD MANUAL: 0 %
EST. AVERAGE GLUCOSE BLD GHB EST-MCNC: 114 MG/DL
GFR SERPLBLD CREATININE-BSD FMLA CKD-EPI: 56 ML/MIN/1.73
GFR SERPLBLD CREATININE-BSD FMLA CKD-EPI: 65 ML/MIN/1.73
GLOBULIN SER CALC-MCNC: 3.2 G/DL (ref 1.5–4.5)
GLUCOSE SERPL-MCNC: 90 MG/DL (ref 65–99)
HBA1C MFR BLD: 5.6 % (ref 4.8–5.6)
HDLC SERPL-MCNC: 56 MG/DL
LDLC SERPL CALC-MCNC: 110 MG/DL (ref 0–99)
LYMPHOCYTES NFR FLD MANUAL: 14 %
Lab: NORMAL
MACROPHAGES NFR FLD MANUAL: 21 %
MICROALBUMIN UR-MCNC: <3 UG/ML
NEUTROPHILS NFR FLD MANUAL: 65 %
NUC CELL # FLD: 694 CELLS/UL (ref 0–200)
POTASSIUM SERPL-SCNC: 5.1 MMOL/L (ref 3.5–5.2)
PROT SERPL-MCNC: 7.5 G/DL (ref 6–8.5)
RBC # FLD AUTO: ABNORMAL /UL
SODIUM SERPL-SCNC: 126 MMOL/L (ref 134–144)
TRIGL SERPL-MCNC: 69 MG/DL (ref 0–149)
VLDLC SERPL CALC-MCNC: 14 MG/DL (ref 5–40)

## 2018-05-25 NOTE — TELEPHONE ENCOUNTER
Pt returned call. She was advised on what was in notes and states that she will come in tues for labs.

## 2018-05-25 NOTE — TELEPHONE ENCOUNTER
Please call patient and advise that her recent lab work showed low sodium level. This will need to be rechecked. I have already ordered the lab and she can come in for a lab appointment on Tuesday to do this.      Velvet Sanchez MD

## 2018-05-27 LAB
GRAM STN SPEC: NORMAL
GRAM STN SPEC: NORMAL

## 2018-05-30 LAB
BUN SERPL-MCNC: 17 MG/DL (ref 8–27)
BUN/CREAT SERPL: 15 (ref 12–28)
CALCIUM SERPL-MCNC: 8.9 MG/DL (ref 8.7–10.3)
CHLORIDE SERPL-SCNC: 95 MMOL/L (ref 96–106)
CO2 SERPL-SCNC: 20 MMOL/L (ref 18–29)
CREAT SERPL-MCNC: 1.14 MG/DL (ref 0.57–1)
GFR SERPLBLD CREATININE-BSD FMLA CKD-EPI: 48 ML/MIN/1.73
GFR SERPLBLD CREATININE-BSD FMLA CKD-EPI: 56 ML/MIN/1.73
GLUCOSE SERPL-MCNC: 86 MG/DL (ref 65–99)
POTASSIUM SERPL-SCNC: 4.6 MMOL/L (ref 3.5–5.2)
SODIUM SERPL-SCNC: 130 MMOL/L (ref 134–144)

## 2018-05-31 ENCOUNTER — TELEPHONE (OUTPATIENT)
Dept: FAMILY MEDICINE CLINIC | Age: 73
End: 2018-05-31

## 2018-05-31 NOTE — TELEPHONE ENCOUNTER
Need to advise per lab results from lab letter: Your sodium is still low. Please stop taking HCTZ. Return for an appointment to recheck for blood pressure and blood work in 1 week. Please bring all medications to the visitand remember to eat and drink prior to coming. Patient verbalized understanding. Will stop taking HCTZ and return in 1 week for appointment.

## 2018-06-11 ENCOUNTER — OFFICE VISIT (OUTPATIENT)
Dept: FAMILY MEDICINE CLINIC | Age: 73
End: 2018-06-11

## 2018-06-11 VITALS
HEART RATE: 69 BPM | TEMPERATURE: 98.1 F | SYSTOLIC BLOOD PRESSURE: 163 MMHG | WEIGHT: 200.4 LBS | BODY MASS INDEX: 36.88 KG/M2 | OXYGEN SATURATION: 98 % | DIASTOLIC BLOOD PRESSURE: 61 MMHG | RESPIRATION RATE: 20 BRPM | HEIGHT: 62 IN

## 2018-06-11 DIAGNOSIS — E11.9 DIABETES MELLITUS TYPE 2, DIET-CONTROLLED (HCC): ICD-10-CM

## 2018-06-11 DIAGNOSIS — Z12.11 SCREENING FOR MALIGNANT NEOPLASM OF COLON: ICD-10-CM

## 2018-06-11 DIAGNOSIS — Z13.39 SCREENING FOR ALCOHOLISM: ICD-10-CM

## 2018-06-11 DIAGNOSIS — Z00.00 MEDICARE ANNUAL WELLNESS VISIT, SUBSEQUENT: ICD-10-CM

## 2018-06-11 DIAGNOSIS — Z71.89 ADVANCED DIRECTIVES, COUNSELING/DISCUSSION: ICD-10-CM

## 2018-06-11 DIAGNOSIS — Z13.31 SCREENING FOR DEPRESSION: ICD-10-CM

## 2018-06-11 DIAGNOSIS — Z13.5 SCREENING FOR GLAUCOMA: ICD-10-CM

## 2018-06-11 DIAGNOSIS — I10 ESSENTIAL HYPERTENSION: Primary | ICD-10-CM

## 2018-06-11 PROBLEM — E11.21 TYPE 2 DIABETES WITH NEPHROPATHY (HCC): Status: ACTIVE | Noted: 2018-06-11

## 2018-06-11 NOTE — ACP (ADVANCE CARE PLANNING)
Advance Care Planning    Advance Care Planning (ACP) Provider Conversation Snapshot    Date of ACP Conversation: 06/11/18  Persons included in Conversation:  patient  Length of ACP Conversation in minutes:  <16 minutes (Non-Billable)    Authorized Decision Maker (if patient is incapable of making informed decisions):    This person is:   Patient reports that her daughter will be her proxy          For Patients with Decision Making Capacity:   Values/Goals: Exploration of values, goals, and preferences if recovery is not expected, even with continued medical treatment in the event of:  Imminent death  Severe, permanent brain injury    Conversation Outcomes / Follow-Up Plan:   Recommended completion of Advance Directive form after review of ACP materials and conversation with prospective healthcare agent

## 2018-06-11 NOTE — PROGRESS NOTES
Ziyad Urban  67 y.o. female  1945  [address] [facility]  [id number]     [facility]: Progress Note       Encounter Date: 6/11/2018    Chief Complaint   Patient presents with   [address] Hypertension       History provided by patient  History of Present Illness   Ziyad Urban is a 67 y.o. female who presents to clinic today for:    Hypertension: Stable   BP Readings from Last 3 Encounters:   06/11/18 163/61   05/24/18 165/69   05/04/18 133/57     The patient reports:  taking medications as instructed, no medication side effects noted, no TIA's, no chest pain on exertion, no dyspnea on exertion, no swelling of ankles. Sodium   Date Value Ref Range Status   05/29/2018 130 (L) 134 - 144 mmol/L Final     Potassium   Date Value Ref Range Status   05/29/2018 4.6 3.5 - 5.2 mmol/L Final     Chloride   Date Value Ref Range Status   05/29/2018 95 (L) 96 - 106 mmol/L Final     Creatinine   Date Value Ref Range Status   05/29/2018 1.14 (H) 0.57 - 1.00 mg/dL Final   05/24/2018 1.00 0.57 - 1.00 mg/dL Final   03/23/2018 0.90 0.57 - 1.00 mg/dL Final     GFR est AA   Date Value Ref Range Status   05/29/2018 56 (L) >59 mL/min/1.73 Final   05/24/2018 65 >59 mL/min/1.73 Final   03/23/2018 74 >59 mL/min/1.73 Final     GFR est non-AA   Date Value Ref Range Status   05/29/2018 48 (L) >59 mL/min/1.73 Final   05/24/2018 56 (L) >59 mL/min/1.73 Final   03/23/2018 64 >59 mL/min/1.73 Final       Our goal is to normalize the blood pressure to decrease the risks of strokes and heart attacks. The patient is in agreement with the plan. Hyponatremia  Patient has been having recent low sodium on blood work. HCTZ was discontinued by phone. Patient reports that she has been compliant. Has eaten this morning. Health Maintenance  Addressed below.      Health Maintenance Due   Topic Date Due    DTaP/Tdap/Td series (1 - Tdap) 07/07/1966    GLAUCOMA SCREENING Q2Y  07/07/2010    EYE EXAM RETINAL OR DILATED Q1  11/12/2014    FOBT Q 1 YEAR AGE 50-75  02/24/2016    BREAST CANCER SCRN MAMMOGRAM  04/14/2016    MEDICARE YEARLY EXAM  03/23/2018     Review of Systems   Review of Systems   Constitutional: Negative for diaphoresis, fever and weight loss. Eyes: Negative for discharge and redness. Respiratory: Negative for cough, shortness of breath and wheezing. Cardiovascular: Negative for chest pain, palpitations and leg swelling. Gastrointestinal: Negative for abdominal pain, diarrhea, nausea and vomiting. Musculoskeletal: Positive for joint pain. Negative for back pain, falls and myalgias. Skin: Negative for itching and rash. Neurological: Negative for dizziness, tingling, sensory change, focal weakness, weakness and headaches. Vitals/Objective:     Vitals:    06/11/18 1028   BP: 163/61   Pulse: 69   Resp: 20   Temp: 98.1 °F (36.7 °C)   TempSrc: Oral   SpO2: 98%   Weight: 200 lb 6.4 oz (90.9 kg)   Height: 5' 2\" (1.575 m)     Body mass index is 36.65 kg/(m^2). Wt Readings from Last 3 Encounters:   06/11/18 200 lb 6.4 oz (90.9 kg)   05/24/18 195 lb 3.2 oz (88.5 kg)   05/04/18 194 lb (88 kg)       Physical Exam   Constitutional: She is oriented to person, place, and time. She appears well-developed and well-nourished. HENT:   Head: Normocephalic and atraumatic. Cardiovascular: Normal rate and regular rhythm. Pulmonary/Chest: Effort normal and breath sounds normal.   Neurological: She is alert and oriented to person, place, and time. Skin: Skin is warm and dry. No results found for this or any previous visit (from the past 24 hour(s)). Assessment and Plan:     Encounter Diagnoses     ICD-10-CM ICD-9-CM   1. Essential hypertension I10 401.9   2. Diabetes mellitus type 2, diet-controlled (HCC) E11.9 250.00   3. Screening for glaucoma Z13.5 V80.1   4. Screening for malignant neoplasm of colon Z12.11 V76.51       1. Essential hypertension  Just outside recommended goal for age.  Will continue off HCTZ and recheck sodium today. - METABOLIC PANEL, BASIC    2. Diabetes mellitus type 2, diet-controlled ([facility]  - REFERRAL TO OPTOMETRY    I have discussed the diagnosis with the patient and the intended plan as seen in the above orders. she has expressed understanding. The patient has received an after-visit summary and questions were answered concerning future plans. I have discussed medication side effects and warnings with the patient as well. Electronically Signed: Raysa Kang MD     History/Allergies   Patients past medical, surgical and family histories were reviewed and updated. Past Medical History:   Diagnosis Date    Hypertension     Psychotic disorder       Past Surgical History:   Procedure Laterality Date    HX GYN       No family history on file. Social History     Social History    Marital status:      Spouse name: N/A    Number of children: N/A    Years of education: N/A     Occupational History    Not on file. Social History Main Topics    Smoking status: Former Smoker    Smokeless tobacco: Never Used    Alcohol use No    Drug use: No    Sexual activity: Not on file     Other Topics Concern    Not on file     Social History Narrative         Allergies   Allergen Reactions    Bactrim [Sulfamethoxazole-Trimethoprim] Nausea Only       Disposition     Follow-up Disposition:  Return in about 3 months (around 9/11/2018) for Routine. .    No future appointments. Current Medications after this visit     Current Outpatient Prescriptions   Medication Sig    lisinopril (PRINIVIL, ZESTRIL) 40 mg tablet Take 1 Tab by mouth daily. Indications: High Blood Pressure    diclofenac EC (VOLTAREN) 75 mg EC tablet TAKE ONE TABLET BY MOUTH TWICE DAILY AS NEEDED    lurasidone (LATUDA) 80 mg tab tablet Take 1 Tab by mouth two (2) times a day.  ARIPiprazole (ABILIFY) 15 mg tablet Take 1 Tab by mouth daily.     benztropine (COGENTIN) 0.5 mg tablet Take 1 Tab by mouth two (2) times a day.  Cholecalciferol, Vitamin D3, (VITAMIN D3) 2,000 unit cap capsule Take 2,000 Units by mouth daily. Indications: Vitamin D Deficiency    OLANZapine (ZYPREXA) 10 mg tablet Take 10 mg by mouth nightly. No current facility-administered medications for this visit. Medications Discontinued During This Encounter   Medication Reason    colchicine 0.6 mg tablet Not A Current Medication    Omeprazole delayed release (PRILOSEC D/R) 20 mg tablet Not A Current Medication    hydrOXYzine HCl (ATARAX) 25 mg tablet Not A Current Medication    hydroCHLOROthiazide (HYDRODIURIL) 12.5 mg tablet Side Effects    lidocaine (SALONPAS/ASPERCREME) 4 % patch Not A Current Medication              This is the Subsequent Medicare Annual Wellness Exam, performed 12 months or more after the Initial AWV or the last Subsequent AWV    I have reviewed the patient's medical history in detail and updated the computerized patient record. History     Past Medical History:   Diagnosis Date    Hypertension     Psychotic disorder       Past Surgical History:   Procedure Laterality Date    HX GYN       Current Outpatient Prescriptions   Medication Sig Dispense Refill    lisinopril (PRINIVIL, ZESTRIL) 40 mg tablet Take 1 Tab by mouth daily. Indications: High Blood Pressure 90 Tab 1    diclofenac EC (VOLTAREN) 75 mg EC tablet TAKE ONE TABLET BY MOUTH TWICE DAILY AS NEEDED 60 Tab 0    lurasidone (LATUDA) 80 mg tab tablet Take 1 Tab by mouth two (2) times a day. 30 Tab 0    ARIPiprazole (ABILIFY) 15 mg tablet Take 1 Tab by mouth daily.  benztropine (COGENTIN) 0.5 mg tablet Take 1 Tab by mouth two (2) times a day.  Cholecalciferol, Vitamin D3, (VITAMIN D3) 2,000 unit cap capsule Take 2,000 Units by mouth daily. Indications: Vitamin D Deficiency 90 Cap 3    OLANZapine (ZYPREXA) 10 mg tablet Take 10 mg by mouth nightly.        Allergies   Allergen Reactions    Bactrim [Sulfamethoxazole-Trimethoprim] Nausea Only     No family history on file. Social History   Substance Use Topics    Smoking status: Former Smoker    Smokeless tobacco: Never Used    Alcohol use No     Patient Active Problem List   Diagnosis Code    HTN (hypertension) I5    MR (mental retardation) F79    Diabetes mellitus type 2, diet-controlled ([facility] E11.9    Tardive dyskinesia G24.01    Schizophrenia ([facility] F20.9    Severe obesity (BMI 35.0-39. 9) with comorbidity ([facility] E66.01    Type 2 diabetes with nephropathy ([facility] E11.21       Depression Risk Factor Screening:     PHQ over the last two weeks 2/1/2018   PHQ Not Done Active Diagnosis of Depression or Bipolar Disorder   Little interest or pleasure in doing things -   Feeling down, depressed or hopeless -   Total Score PHQ 2 -     Alcohol Risk Factor Screening: You do not drink alcohol or very rarely. Functional Ability and Level of Safety:   Hearing Loss  Hearing is good. Activities of Daily Living  The home contains: handrails and grab bars  Patient needs help with:  transportation, managing medications and managing money    Fall Risk  Fall Risk Assessment, last 12 mths 2/1/2018   Able to walk? Yes   Fall in past 12 months? Yes   Fall with injury?  Yes   Number of falls in past 12 months 1   Fall Risk Score 2       Abuse Screen  Patient is not abused    Cognitive Screening   Evaluation of Cognitive Function:  Has your family/caregiver stated any concerns about your memory: no  Normal    Patient Care Team   Patient Care Team:  Ronel Evans MD as PCP - General (Family Practice)  Raymond Johnson MD (Ophthalmology)  Indio Casey MD (Gastroenterology)  Sheila Gage MD as Consulting Provider (Psychiatry)  Sandy Tse OD (Optometry)    Assessment/Plan   Education and counseling provided:  Are appropriate based on today's review and evaluation  End-of-Life planning (with patient's consent)  Screening Mammography  Colorectal cancer screening tests  Screening for glaucoma  Diabetes screening test    Diagnoses and all orders for this visit:    1. Essential hypertension  -     METABOLIC PANEL, BASIC    2. Diabetes mellitus type 2, diet-controlled ([address]  -     REFERRAL TO OPTOMETRY    3. Screening for glaucoma  -     REFERRAL TO OPTOMETRY    4. Screening for malignant neoplasm of colon  -     OCCULT BLOOD, IMMUNOASSAY (FIT)    5. Medicare annual wellness visit, subsequent    6. Advanced directives, counseling/discussion    7. Screening for alcoholism  -     Annual  Alcohol Screen 15 min ()    8.  Screening for depression  -     Depression Screen Annual        Health Maintenance Due   Topic Date Due    DTaP/Tdap/Td series (1 - Tdap) 07/07/1966    GLAUCOMA SCREENING Q2Y  07/07/2010    EYE EXAM RETINAL OR DILATED Q1  11/12/2014    FOBT Q 1 YEAR AGE 50-75  02/24/2016    BREAST CANCER SCRN MAMMOGRAM  04/14/2016    MEDICARE YEARLY EXAM  03/23/2018

## 2018-06-11 NOTE — MR AVS SNAPSHOT
[address] [address] 
[phone] Patient: Alejandro Castanon MRN: [id number] PLN:[id number] Visit Information Date & Time Provider Department Dept. Phone Encounter #  
 6/11/2018 10:20 AM Galilea Bates MD [address] [place] [address] [id number] Follow-up Instructions Return in about 3 months (around 9/11/2018) for Routine. Kg Armas Upcoming Health Maintenance Date Due DTaP/Tdap/Td series (1 - Tdap) 7/7/1966 GLAUCOMA SCREENING Q2Y 7/7/2010 EYE EXAM RETINAL OR DILATED Q1 11/12/2014 FOBT Q 1 YEAR AGE 50-75 2/24/2016 BREAST CANCER SCRN MAMMOGRAM 4/14/2016 MEDICARE YEARLY EXAM 3/23/2018 Influenza Age 5 to Adult 8/1/2018 HEMOGLOBIN A1C Q6M 11/24/2018 FOOT EXAM Q1 1/24/2019 MICROALBUMIN Q1 5/24/2019 LIPID PANEL Q1 5/24/2019 Allergies as of 6/11/2018  Review Complete On: 6/11/2018 By: Galilea Bates MD  
  
 Severity Noted Reaction Type Reactions Bactrim [Sulfamethoxazole-trimethoprim]  06/27/2013    Nausea Only Current Immunizations  Reviewed on 10/13/2016 Name Date Influenza High Dose Vaccine PF 10/2/2017 Influenza Vaccine (Quad) PF 10/13/2016, 2/9/2016, 9/25/2014 Influenza Vaccine Split 10/16/2012 Pneumococcal Conjugate (PCV-13) 10/24/2017 Pneumococcal Polysaccharide (PPSV-23) 10/22/2013 Not reviewed this visit You Were Diagnosed With   
  
 Codes Comments Essential hypertension    -  Primary ICD-10-CM: I10 
ICD-9-CM: 401.9 Diabetes mellitus type 2, diet-controlled ([facility]     ICD-10-CM: E11.9 ICD-9-CM: 250.00 Screening for glaucoma     ICD-10-CM: Z13.5 ICD-9-CM: V80.1 Screening for malignant neoplasm of colon     ICD-10-CM: Z12.11 ICD-9-CM: V76.51 Vitals BP Pulse Temp Resp Height(growth percentile) Weight(growth percentile) 163/61 (BP 1 Location: Left arm, BP Patient Position: Sitting) 69 98.1 °F (36.7 °C) (Oral) 20 5' 2\" (1.575 m) 200 lb 6.4 oz (90.9 kg) SpO2 BMI OB Status Smoking Status 98% 36.65 kg/m2 Postmenopausal Former Smoker Vitals History BMI and BSA Data Body Mass Index Body Surface Area  
 36.65 kg/m 2 1.99 m 2 Preferred Pharmacy Pharmacy Name Phone [address] [place], [place] - [address] [phone] Your Updated Medication List  
  
   
This list is accurate as of 6/11/18 10:51 AM.  Always use your most recent med list.  
  
  
  
  
 ARIPiprazole 15 mg tablet Commonly known as:  ABILIFY Take 1 Tab by mouth daily. benztropine 0.5 mg tablet Commonly known as:  COGENTIN Take 1 Tab by mouth two (2) times a day. Cholecalciferol (Vitamin D3) 2,000 unit Cap capsule Commonly known as:  VITAMIN D3 Take 2,000 Units by mouth daily. Indications: Vitamin D Deficiency  
  
 diclofenac EC 75 mg EC tablet Commonly known as:  VOLTAREN  
TAKE ONE TABLET BY MOUTH TWICE DAILY AS NEEDED  
  
 LATUDA 80 mg Tab tablet Generic drug:  lurasidone Take 1 Tab by mouth two (2) times a day. lisinopril 40 mg tablet Commonly known as:  Burma Fairy Take 1 Tab by mouth daily. Indications: High Blood Pressure OLANZapine 10 mg tablet Commonly known as:  ZyPREXA Take 10 mg by mouth nightly. We Performed the Following METABOLIC PANEL, BASIC [17944 CPT(R)] OCCULT BLOOD, IMMUNOASSAY (FIT) E404810 CPT(R)] REFERRAL TO OPTOMETRY [id number] Custom] Follow-up Instructions Return in about 3 months (around 9/11/2018) for Routine. .  
  
  
Referral Information Referral ID Referred By Referred To  
  
 [id number] [facility], [address] [place], [place] [address]Jackie, Dominic Nicholas Phone: [phone] Fax: [phone] Visits Status Start Date End Date 1 New Request 6/11/18 6/11/19 If your referral has a status of pending review or denied, additional information will be sent to support the outcome of this decision. Patient Instructions Your physician has referred you for a mammogram. If you would like this done at a [facility] facility, a member of the [facility] Patient Care Team will contact you within 24 hours to schedule. If you do not hear from a team member, please call [phone] to speak with this team directly to schedule your mammogram. 
 
If you prefer this imaging at an alternate location, please call to schedule: [facility]- [phone] [facility]- [phone] [facility]- [phone] Fercho Chapman (Kamilla or Doyne Bamberger)- [phone] [facility]- [phone] [phone] [address] [phone] [facility] [phone] Introducing [facility]! [employer] introduces [employer] patient portal. Now you can access parts of your medical record, email your doctor's office, and request medication refills online. 1. In your internet browser, go to [email or web address] 2. Click on the First Time User? Click Here link in the Sign In box. You will see the New Member Sign Up page. 3. Enter your [employer] Access Code exactly as it appears below. You will not need to use this code after youve completed the sign-up process. If you do not sign up before the expiration date, you must request a new code. · [employer] Access Code: [email or web address] Expires: 8/2/2018  8:27 AM 
 
4. Enter the last four digits of your Social Security Number (xxxx) and Date of Birth (mm/dd/yyyy) as indicated and click Submit. You will be taken to the next sign-up page. 5. Create a [employer] ID. This will be your MyChart login ID and cannot be changed, so think of one that is secure and easy to remember. 6. Create a [employer] password. You can change your password at any time. 7. Enter your Password Reset Question and Answer. This can be used at a later time if you forget your password. 8. Enter your e-mail address. You will receive e-mail notification when new information is available in [address] 9. Click Sign Up. You can now view and download portions of your medical record. 10. Click the Download Summary menu link to download a portable copy of your medical information. If you have questions, please visit the Frequently Asked Questions section of the [employer] website. Remember, [employer] is NOT to be used for urgent needs. For medical emergencies, dial 911. Now available from your iPhone and Android! Please provide this summary of care documentation to your next provider. Your primary care clinician is listed as Bhavesh Medina. If you have any questions after today's visit, please call [phone].

## 2018-06-11 NOTE — PATIENT INSTRUCTIONS
Your physician has referred you for a mammogram. If you would like this done at a [facility] facility, a member of the [facility] Patient Care Team will contact you within 24 hours to schedule. If you do not hear from a team member, please call [phone] to speak with this team directly to schedule your mammogram.    If you prefer this imaging at an alternate location, please call to schedule: [facility]- [address] [facility]- [phone]  [facility])- [phone]  [facility] ([place] or [place])- [phone]  [facility]- [phone]  Ruddy Montano [address] [phone]  [address] [place] [address]- [phone]      Medicare Wellness Visit, Female    The best way to live healthy is to have a lifestyle where you eat a well-balanced diet, exercise regularly, limit alcohol use, and quit all forms of tobacco/nicotine, if applicable. Regular preventive services are another way to keep healthy. Preventive services (vaccines, screening tests, monitoring & exams) can help personalize your care plan, which helps you manage your own care. Screening tests can find health problems at the earliest stages, when they are easiest to treat. [facility]  follows the current, evidence-based guidelines published by the [facility] Abilio Antione (USPSTF) when recommending preventive services for our patients. Because we follow these guidelines, sometimes recommendations change over time as research supports it. (For example, mammograms used to be recommended annually. Even though Medicare will still pay for an annual mammogram, the newer guidelines recommend a mammogram every two years for women of average risk.)    Of course, you and your provider may decide to screen more often for some diseases, based on your risk and co-morbidities (chronic disease you are already diagnosed with). Preventive services for you include:    - Medicare offers their members a free annual wellness visit, which is time for you and your primary care provider to discuss and plan for your preventive service needs. Take advantage of this benefit every year!    -All people over age 72 should receive the recommended pneumonia vaccines. Current USPSTF guidelines recommend a series of two vaccines for the best pneumonia protection.     -All adults should have a yearly flu vaccine and a tetanus vaccine every 10 years. All adults age 61 years should receive a shingles vaccine once in their lifetime.      -A bone mass density test is recommended when a woman turns 65 to screen for osteoporosis. This test is only recommended once as a screening. Some providers will use this same test as a disease monitoring tool if you already have osteoporosis. -All adults age 38-68 years who are overweight should have a diabetes screening test once every three years.     -Other screening tests & preventive services for persons with diabetes include: an eye exam to screen for diabetic retinopathy, a kidney function test, a foot exam, and stricter control over your cholesterol.     -Cardiovascular screening for adults with routine risk involves an electrocardiogram (ECG) at intervals determined by the provider.     -Colorectal cancer screenings should be done for adults age 54-65 years with normal risk. There are a number of acceptable methods of screening for this type of cancer. Each test has its own benefits and drawbacks. Discuss with your provider what is most appropriate for you during your annual wellness visit. The different tests include: colonoscopy (considered the best screening method), a fecal occult blood test, a fecal DNA test, and sigmoidoscopy.     -Breast cancer screenings are recommended every other year for women of normal risk age 54-69 years.     -Cervical cancer screenings for women over age 72 are only recommended with certain risk factors.     -All adults born between [facility] should be screened once for Hepatitis C.      Here is a list of your current Health Maintenance items (your personalized list of preventive services) with a due date:  Health Maintenance Due   Topic Date Due    DTaP/Tdap/Td  (1 - Tdap) 07/07/1966    Glaucoma Screening   07/07/2010    Eye Exam  11/12/2014    Stool testing for trace blood  02/24/2016    Breast Cancer Screening  04/14/2016    Annual Well Visit  03/23/2018

## 2018-06-11 NOTE — PROGRESS NOTES
1. Have you been to the ER, urgent care clinic, or been hospitalized since your last visit? No     2. Have you seen or consulted any other health care providers outside of the [address] [place] [address] since your last visit? Include any pap smears or colon screening.     Reviewed record in preparation for visit and have necessary documentation  Pt did not bring medication to office visit for review  Information was given to pt on Advanced Directives, Living Will  Information was given on Shingles Vaccine  opportunity was given for questions  Goals that were addressed and/or need to be completed during or after this appointment include       Health Maintenance Due   Topic Date Due    DTaP/Tdap/Td series (1 - Tdap) 07/07/1966    GLAUCOMA SCREENING Q2Y  07/07/2010    EYE EXAM RETINAL OR DILATED Q1  11/12/2014    FOBT Q 1 YEAR AGE 50-75  02/24/2016    BREAST CANCER SCRN MAMMOGRAM  04/14/2016    MEDICARE YEARLY EXAM  03/23/2018

## 2018-06-12 LAB
BUN SERPL-MCNC: 15 MG/DL (ref 8–27)
BUN/CREAT SERPL: 16 (ref 12–28)
CALCIUM SERPL-MCNC: 8.8 MG/DL (ref 8.7–10.3)
CHLORIDE SERPL-SCNC: 101 MMOL/L (ref 96–106)
CO2 SERPL-SCNC: 22 MMOL/L (ref 20–29)
CREAT SERPL-MCNC: 0.95 MG/DL (ref 0.57–1)
GFR SERPLBLD CREATININE-BSD FMLA CKD-EPI: 60 ML/MIN/1.73
GFR SERPLBLD CREATININE-BSD FMLA CKD-EPI: 69 ML/MIN/1.73
GLUCOSE SERPL-MCNC: 93 MG/DL (ref 65–99)
POTASSIUM SERPL-SCNC: 4.4 MMOL/L (ref 3.5–5.2)
SODIUM SERPL-SCNC: 136 MMOL/L (ref 134–144)

## 2018-06-13 DIAGNOSIS — M25.562 BILATERAL CHRONIC KNEE PAIN: ICD-10-CM

## 2018-06-13 DIAGNOSIS — M25.561 BILATERAL CHRONIC KNEE PAIN: ICD-10-CM

## 2018-06-13 DIAGNOSIS — M25.562 ACUTE PAIN OF LEFT KNEE: ICD-10-CM

## 2018-06-13 DIAGNOSIS — G89.29 BILATERAL CHRONIC KNEE PAIN: ICD-10-CM

## 2018-06-13 RX ORDER — DICLOFENAC SODIUM 75 MG/1
TABLET, DELAYED RELEASE ORAL
Qty: 60 TAB | Refills: 0 | Status: SHIPPED | OUTPATIENT
Start: 2018-06-13 | End: 2018-07-12

## 2018-06-15 ENCOUNTER — TELEPHONE (OUTPATIENT)
Dept: FAMILY MEDICINE CLINIC | Age: 73
End: 2018-06-15

## 2018-06-15 NOTE — TELEPHONE ENCOUNTER
Attempted to call. No answer. Message left. Please advise patient of lab results per lab letter:    None. Keep up the good work! Continue with current  Medications.

## 2018-06-19 ENCOUNTER — TELEPHONE (OUTPATIENT)
Dept: FAMILY MEDICINE CLINIC | Age: 73
End: 2018-06-19

## 2018-06-19 DIAGNOSIS — R60.0 PEDAL EDEMA: Primary | ICD-10-CM

## 2018-06-19 RX ORDER — FUROSEMIDE 20 MG/1
20 TABLET ORAL DAILY
Qty: 30 TAB | Refills: 2 | Status: SHIPPED | OUTPATIENT
Start: 2018-06-19 | End: 2018-07-12 | Stop reason: SDUPTHER

## 2018-06-19 NOTE — TELEPHONE ENCOUNTER
Pt called stating that she has fluid in her left leg. She wants to know if a fluid pill could be prescribed for her. Its painful for her to walk on leg.      [phone]

## 2018-07-11 ENCOUNTER — TELEPHONE (OUTPATIENT)
Dept: FAMILY MEDICINE CLINIC | Age: 73
End: 2018-07-11

## 2018-07-11 NOTE — TELEPHONE ENCOUNTER
Patient states she feels like her swelling is getting worse in both legs and feet. She says she takes the Lasix every day like she is supposed to and it is not working. Patient also wants a \"man doctor\" because the women \"do not work as well\" for her. I advised her I would contact Dr. Russell Wiggins to see what she should do for the swelling and that if she would like to choose to see another doctor in the practice she has that option. She stated she will stay with Dr. Russell Wiggins for now.

## 2018-07-11 NOTE — TELEPHONE ENCOUNTER
If the medication is not working, she probably needs to be seen. If she prefers a male provider, Dr. Nadine Schmidt is in the office tomorrow and I believe he has several openings still.     Sneha Deleon MD

## 2018-07-11 NOTE — TELEPHONE ENCOUNTER
Pt called to report that the medication that was prescribed for fluid does not seem to be working. She states that her legs are still swelling.

## 2018-07-12 ENCOUNTER — OFFICE VISIT (OUTPATIENT)
Dept: FAMILY MEDICINE CLINIC | Age: 73
End: 2018-07-12

## 2018-07-12 VITALS
RESPIRATION RATE: 20 BRPM | BODY MASS INDEX: 35.51 KG/M2 | TEMPERATURE: 98.8 F | HEIGHT: 62 IN | OXYGEN SATURATION: 97 % | DIASTOLIC BLOOD PRESSURE: 73 MMHG | WEIGHT: 193 LBS | HEART RATE: 76 BPM | SYSTOLIC BLOOD PRESSURE: 159 MMHG

## 2018-07-12 DIAGNOSIS — R09.89 LABILE HYPERTENSION: ICD-10-CM

## 2018-07-12 DIAGNOSIS — M25.562 CHRONIC PAIN OF BOTH KNEES: ICD-10-CM

## 2018-07-12 DIAGNOSIS — I10 ESSENTIAL HYPERTENSION: ICD-10-CM

## 2018-07-12 DIAGNOSIS — G89.29 CHRONIC PAIN OF BOTH KNEES: ICD-10-CM

## 2018-07-12 DIAGNOSIS — R60.9 FLUID RETENTION: ICD-10-CM

## 2018-07-12 DIAGNOSIS — M25.561 CHRONIC PAIN OF BOTH KNEES: ICD-10-CM

## 2018-07-12 DIAGNOSIS — M79.89 LEG SWELLING: Primary | ICD-10-CM

## 2018-07-12 DIAGNOSIS — R60.0 EDEMA OF EXTREMITIES: ICD-10-CM

## 2018-07-12 RX ORDER — FUROSEMIDE 40 MG/1
40 TABLET ORAL DAILY
Qty: 30 TAB | Refills: 2 | Status: SHIPPED | OUTPATIENT
Start: 2018-07-12 | End: 2018-10-04 | Stop reason: SDUPTHER

## 2018-07-12 NOTE — PROGRESS NOTES
1. Have you been to the ER, urgent care clinic since your last visit? Hospitalized since your last visit? No    2. Have you seen or consulted any other health care providers outside of the [address] [place] [address] since your last visit? Include any pap smears or colon screening.  No  Reviewed record in preparation for visit and have necessary documentation  Pt did not bring medication to office visit for review  Information was given to pt on Advanced Directives, Living Will  Information was given on Shingles Vaccine  opportunity was given for questions  Goals that were addressed and/or need to be completed during or after this appointment include   Health Maintenance Due   Topic Date Due    DTaP/Tdap/Td series (1 - Tdap) 07/07/1966    GLAUCOMA SCREENING Q2Y  07/07/2010    EYE EXAM RETINAL OR DILATED Q1  11/12/2014    FOBT Q 1 YEAR AGE 50-75  02/24/2016    BREAST CANCER SCRN MAMMOGRAM  04/14/2016

## 2018-07-12 NOTE — PATIENT INSTRUCTIONS
Your doctor has recommended that you have an eye exam done. You can contact one of the below offices to schedule your own appointment. Once you have the visit, please ask their office to send us a copy for your record here. Eladio Paige                     [phone]  Dr. Zeke Muñoz                          [phone]  Dr. Tuan Suazo                           [phone]  [address]             [phone]    Your Physician has referred you for a Mammogram   please call [phone] to speak with this team directly to schedule an appointment     Home Blood Pressure Test: About This Test  What is it? A home blood pressure test allows you to keep track of your blood pressure at home. Blood pressure is a measure of the force of blood against the walls of your arteries. Blood pressure readings include two numbers, such as 130/80 (say \"130 over 80\"). The first number is the systolic pressure. The second number is the diastolic pressure. Why is this test done? You may do this test at home to:  · Find out if you have high blood pressure. · Track your blood pressure if you have high blood pressure. · Track how well medicine is working to reduce high blood pressure. · Check how lifestyle changes, such as weight loss and exercise, are affecting blood pressure. How can you prepare for the test?  · Do not use caffeine, tobacco, or medicines known to raise blood pressure (such as nasal decongestant sprays) for at least 30 minutes before taking your blood pressure. · Do not exercise for at least 30 minutes before taking your blood pressure. What happens before the test?  Take your blood pressure while you feel comfortable and relaxed.  Sit quietly with both feet on the floor for at least 5 minutes before the test.  What happens during the test?  · Sit with your arm slightly bent and resting on a table so that your upper arm is at the same level as your heart. · Roll up your sleeve or take off your shirt to expose your upper arm. · Wrap the blood pressure cuff around your upper arm so that the lower edge of the cuff is about 1 inch above the bend of your elbow. Proceed with the following steps depending on if you are using an automatic or manual pressure monitor. Automatic blood pressure monitors  · Press the on/off button on the automatic monitor and wait until the ready-to-measure \"heart\" symbol appears next to zero in the display window. · Press the start button. The cuff will inflate and deflate by itself. · Your blood pressure numbers will appear on the screen. · Write your numbers in your log book, along with the date and time. Manual blood pressure monitors  · Place the earpieces of a stethoscope in your ears, and place the bell of the stethoscope over the artery, just below the cuff. · Close the valve on the rubber inflating bulb. · Squeeze the bulb rapidly with your opposite hand to inflate the cuff until the dial or column of mercury reads about 30 mm Hg higher than your usual systolic pressure. If you do not know your usual pressure, inflate the cuff to 210 mm Hg or until the pulse at your wrist disappears. · Open the pressure valve just slightly by twisting or pressing the valve on the bulb. · As you watch the pressure slowly fall, note the level on the dial at which you first start to hear a pulsing or tapping sound through the stethoscope. This is your systolic blood pressure. · Continue letting the air out slowly. The sounds will become muffled and will finally disappear. Note the pressure when the sounds completely disappear. This is your diastolic blood pressure. Let out all the remaining air. · Write your numbers in your log book, along with the date and time. What else should you know about the test?  Here are the categories of blood pressure for adults:  Ideal blood pressure.    Systolic is less than 011, and diastolic is less than 80. Elevated blood pressure. Systolic is 439 to 139, and diastolic is less than 80. High blood pressure (hypertension). Systolic is 885 or above. Diastolic is 80 or above. One or both numbers may be high. It is more accurate to take the average of several readings made throughout the day than to rely on a single reading. Follow-up care is a key part of your treatment and safety. Be sure to make and go to all appointments, and call your doctor if you are having problems. It's also a good idea to keep a list of the medicines you take. Where can you learn more? Go to http://florin-kevin.info/. Enter C427 in the search box to learn more about \"Home Blood Pressure Test: About This Test.\"  Current as of: 2017  Content Version: 11.7  © 8943-5786 [employer]. Care instructions adapted under license by [employer] (which disclaims liability or warranty for this information). If you have questions about a medical condition or this instruction, always ask your healthcare professional. [place] [address] any warranty or liability for your use of this information. Suzan Falcon with [facility]  [address] [place] [address]., [facility], [address] [place] [address]  [phone]    Monitor blood pressure outside the office several times weekly at different times during the day and evening. Bring the record to me in 3 weeks for review.     Blood Pressure Record     Patient Name:  ______________________ :  ______________________    Date/Time BP Reading Pulse                                                                                                                                                                                                High Blood Pressure: Care Instructions  Your Care Instructions    If your blood pressure is usually above 130/80, you have high blood pressure, or hypertension. That means the top number is 130 or higher or the bottom number is 80 or higher, or both. Despite what a lot of people think, high blood pressure usually doesn't cause headaches or make you feel dizzy or lightheaded. It usually has no symptoms. But it does increase your risk for heart attack, stroke, and kidney or eye damage. The higher your blood pressure, the more your risk increases. Your doctor will give you a goal for your blood pressure. Your goal will be based on your health and your age. Lifestyle changes, such as eating healthy and being active, are always important to help lower blood pressure. You might also take medicine to reach your blood pressure goal.  Follow-up care is a key part of your treatment and safety. Be sure to make and go to all appointments, and call your doctor if you are having problems. It's also a good idea to know your test results and keep a list of the medicines you take. How can you care for yourself at home? Medical treatment  · If you stop taking your medicine, your blood pressure will go back up. You may take one or more types of medicine to lower your blood pressure. Be safe with medicines. Take your medicine exactly as prescribed. Call your doctor if you think you are having a problem with your medicine. · Talk to your doctor before you start taking aspirin every day. Aspirin can help certain people lower their risk of a heart attack or stroke. But taking aspirin isn't right for everyone, because it can cause serious bleeding. · See your doctor regularly. You may need to see the doctor more often at first or until your blood pressure comes down. · If you are taking blood pressure medicine, talk to your doctor before you take decongestants or anti-inflammatory medicine, such as ibuprofen. Some of these medicines can raise blood pressure. · Learn how to check your blood pressure at home. Lifestyle changes  · Stay at a healthy weight.  This is especially important if you put on weight around the waist. Losing even 10 pounds can help you lower your blood pressure. · If your doctor recommends it, get more exercise. Walking is a good choice. Bit by bit, increase the amount you walk every day. Try for at least 30 minutes on most days of the week. You also may want to swim, bike, or do other activities. · Avoid or limit alcohol. Talk to your doctor about whether you can drink any alcohol. · Try to limit how much sodium you eat to less than 2,300 milligrams (mg) a day. Your doctor may ask you to try to eat less than 1,500 mg a day. · Eat plenty of fruits (such as bananas and oranges), vegetables, legumes, whole grains, and low-fat dairy products. · Lower the amount of saturated fat in your diet. Saturated fat is found in animal products such as milk, cheese, and meat. Limiting these foods may help you lose weight and also lower your risk for heart disease. · Do not smoke. Smoking increases your risk for heart attack and stroke. If you need help quitting, talk to your doctor about stop-smoking programs and medicines. These can increase your chances of quitting for good. When should you call for help? Call 911 anytime you think you may need emergency care. This may mean having symptoms that suggest that your blood pressure is causing a serious heart or blood vessel problem. Your blood pressure may be over 180/110.   For example, call 911 if:    · You have symptoms of a heart attack. These may include:  ¨ Chest pain or pressure, or a strange feeling in the chest.  ¨ Sweating. ¨ Shortness of breath. ¨ Nausea or vomiting. ¨ Pain, pressure, or a strange feeling in the back, neck, jaw, or upper belly or in one or both shoulders or arms. ¨ Lightheadedness or sudden weakness. ¨ A fast or irregular heartbeat.     · You have symptoms of a stroke.  These may include:  ¨ Sudden numbness, tingling, weakness, or loss of movement in your face, arm, or leg, especially on only one side of your body. ¨ Sudden vision changes. ¨ Sudden trouble speaking. ¨ Sudden confusion or trouble understanding simple statements. ¨ Sudden problems with walking or balance. ¨ A sudden, severe headache that is different from past headaches.     · You have severe back or belly pain.    Do not wait until your blood pressure comes down on its own. Get help right away.   Call your doctor now or seek immediate care if:    · Your blood pressure is much higher than normal (such as 180/110 or higher), but you don't have symptoms.     · You think high blood pressure is causing symptoms, such as:  ¨ Severe headache. ¨ Blurry vision.    Watch closely for changes in your health, and be sure to contact your doctor if:    · Your blood pressure measures 140/90 or higher at least 2 times. That means the top number is 140 or higher or the bottom number is 90 or higher, or both.     · You think you may be having side effects from your blood pressure medicine.     · Your blood pressure is usually normal, but it goes above normal at least 2 times. Where can you learn more? Go to http://florin-kevin.info/. Enter P354 in the search box to learn more about \"High Blood Pressure: Care Instructions. \"  Current as of: December 6, 2017  Content Version: 11.7  © 2311-4638 [employer]. Care instructions adapted under license by [employer] (which disclaims liability or warranty for this information). If you have questions about a medical condition or this instruction, always ask your healthcare professional. [place] [address] any warranty or liability for your use of this information. Joint Pain: Care Instructions  Your Care Instructions    Many people have small aches and pains from overuse or injury to muscles and joints. Joint injuries often happen during sports or recreation, work tasks, or projects around the home.  An overuse injury can happen when you put too much stress on a joint or when you do an activity that stresses the joint over and over, such as using the computer or rowing a boat. You can take action at home to help your muscles and joints get better. You should feel better in 1 to 2 weeks, but it can take 3 months or more to heal completely. Follow-up care is a key part of your treatment and safety. Be sure to make and go to all appointments, and call your doctor if you are having problems. It's also a good idea to know your test results and keep a list of the medicines you take. How can you care for yourself at home? · Do not put weight on the injured joint for at least a day or two. · For the first day or two after an injury, do not take hot showers or baths, and do not use hot packs. The heat could make swelling worse. · Put ice or a cold pack on the sore joint for 10 to 20 minutes at a time. Try to do this every 1 to 2 hours for the next 3 days (when you are awake) or until the swelling goes down. Put a thin cloth between the ice and your skin. · Wrap the injury in an elastic bandage. Do not wrap it too tightly because this can cause more swelling. · Prop up the sore joint on a pillow when you ice it or anytime you sit or lie down during the next 3 days. Try to keep it above the level of your heart. This will help reduce swelling. · Take an over-the-counter pain medicine, such as acetaminophen (Tylenol), ibuprofen (Advil, Motrin), or naproxen (Aleve). Read and follow all instructions on the label. · After 1 or 2 days of rest, begin moving the joint gently. While the joint is still healing, you can begin to exercise using activities that do not strain or hurt the painful joint. When should you call for help? Call your doctor now or seek immediate medical care if:    · You have signs of infection, such as:  ¨ Increased pain, swelling, warmth, and redness. ¨ Red streaks leading from the joint.   ¨ A fever.    Watch closely for changes in your health, and be sure to contact your doctor if:    · Your movement or symptoms are not getting better after 1 to 2 weeks of home treatment. Where can you learn more? Go to http://florin-kevin.info/. Enter P205 in the search box to learn more about \"Joint Pain: Care Instructions. \"  Current as of: November 29, 2017  Content Version: 11.7  © 7332-3615 [employer]. Care instructions adapted under license by [employer] (which disclaims liability or warranty for this information). If you have questions about a medical condition or this instruction, always ask your healthcare professional. [address] any warranty or liability for your use of this information.

## 2018-07-12 NOTE — MR AVS SNAPSHOT
[address] [place] [id number] 
[phone] Patient: Radha Chadwick MRN: [id number] JBM:4/9/0243 Visit Information Date & Time Provider Department Dept. Phone Encounter #  
 7/12/2018  8:10 AM David Schofield MD [address] [place], [place] [address]alba [place] Lancey [id number] Follow-up Instructions Return in about 1 month (around 8/12/2018), or if symptoms worsen or fail to improve. Upcoming Health Maintenance Date Due DTaP/Tdap/Td series (1 - Tdap) 7/7/1966 GLAUCOMA SCREENING Q2Y 7/7/2010 EYE EXAM RETINAL OR DILATED Q1 11/12/2014 FOBT Q 1 YEAR AGE 50-75 2/24/2016 BREAST CANCER SCRN MAMMOGRAM 4/14/2016 Influenza Age 5 to Adult 8/1/2018 HEMOGLOBIN A1C Q6M 11/24/2018 FOOT EXAM Q1 1/24/2019 MICROALBUMIN Q1 5/24/2019 LIPID PANEL Q1 5/24/2019 MEDICARE YEARLY EXAM 6/12/2019 Allergies as of 7/12/2018  Review Complete On: 7/12/2018 By: Kristi Bird LPN Severity Noted Reaction Type Reactions Bactrim [Sulfamethoxazole-trimethoprim]  06/27/2013    Nausea Only Current Immunizations  Reviewed on 10/13/2016 Name Date Influenza High Dose Vaccine PF 10/2/2017 Influenza Vaccine (Quad) PF 10/13/2016, 2/9/2016, 9/25/2014 Influenza Vaccine Split 10/16/2012 Pneumococcal Conjugate (PCV-13) 10/24/2017 Pneumococcal Polysaccharide (PPSV-23) 10/22/2013 Not reviewed this visit You Were Diagnosed With   
  
 Codes Comments Leg swelling    -  Primary ICD-10-CM: M79.89 ICD-9-CM: 729.81 Essential hypertension     ICD-10-CM: I10 
ICD-9-CM: 401.9 Chronic pain of both knees     ICD-10-CM: M25.561, M25.562, G89.29 ICD-9-CM: 719.46, 338.29 Fluid retention     ICD-10-CM: R60.9 ICD-9-CM: 276.69 Edema of extremities     ICD-10-CM: R60.0 ICD-9-CM: 782.3 Labile hypertension     ICD-10-CM: R09.89 ICD-9-CM: 401.9 Vitals BP Pulse Temp Resp Height(growth percentile) Weight(growth percentile) 159/73 (BP 1 Location: Left arm, BP Patient Position: Sitting) 76 98.8 °F (37.1 °C) 20 5' 2\" (1.575 m) 193 lb (87.5 kg) SpO2 BMI OB Status Smoking Status 97% 35.3 kg/m2 Postmenopausal Former Smoker Vitals History BMI and BSA Data Body Mass Index Body Surface Area  
 35.3 kg/m 2 1.96 m 2 [facility] [phone] Your Updated Medication List  
  
   
This list is accurate as of 7/12/18  9:15 AM.  Always use your most recent med list.  
  
  
  
  
 ARIPiprazole 15 mg tablet Commonly known as:  ABILIFY Take 1 Tab by mouth daily. benztropine 0.5 mg tablet Commonly known as:  COGENTIN Take 0.5 mg by mouth two (2) times a day. Together with 1 mg Cholecalciferol (Vitamin D3) 2,000 unit Cap capsule Commonly known as:  VITAMIN D3 Take 2,000 Units by mouth daily. Indications: Vitamin D Deficiency  
  
 furosemide 40 mg tablet Commonly known as:  LASIX Take 1 Tab by mouth daily. LATUDA 80 mg Tab tablet Generic drug:  lurasidone Take 1 Tab by mouth two (2) times a day. lisinopril 40 mg tablet Commonly known as:  Mollie Ripa Take 1 Tab by mouth daily. Indications: High Blood Pressure OLANZapine 10 mg tablet Commonly known as:  ZyPREXA Take 10 mg by mouth nightly. Prescriptions Sent to Pharmacy Refills  
 furosemide (LASIX) 40 mg tablet 2 Sig: Take 1 Tab by mouth daily. Class: Normal  
 Pharmacy: [facility] #: [phone] Route: Oral  
  
We Performed the Following CBC W/O DIFF [30014 CPT(R)] METABOLIC PANEL, BASIC [57531 CPT(R)] Follow-up Instructions Return in about 1 month (around 8/12/2018), or if symptoms worsen or fail to improve.   
  
To-Do List   
 07/12/2018 Imaging:  XR CHEST PA LAT   
  
 07/12/2018 Imaging:  XR KNEE LT MAX 2 VWS   
  
 07/12/2018 Imaging:  XR KNEE RT MAX 2 VWS   
  
 07/19/2018 ECHO:  2D ECHO COMPLETE ADULT (TTE) W OR WO CONTR Patient Instructions Your doctor has recommended that you have an eye exam done. You can contact one of the below offices to schedule your own appointment. Once you have the visit, please ask their office to send us a copy for your record here. [facility]                     [phone] Dr. Sanjuanita Xiong                          [phone] Dr. Orozco [facility]                           [phone] [address] [place] [address]                               [phone] [address] [facility]             [phone] Your Physician has referred you for a Mammogram 
 please call [phone] to speak with this team directly to schedule an appointment Home Blood Pressure Test: About This Test 
What is it? A home blood pressure test allows you to keep track of your blood pressure at home. Blood pressure is a measure of the force of blood against the walls of your arteries. Blood pressure readings include two numbers, such as 130/80 (say \"130 over 80\"). The first number is the systolic pressure. The second number is the diastolic pressure. Why is this test done? You may do this test at home to: · Find out if you have high blood pressure. · Track your blood pressure if you have high blood pressure. · Track how well medicine is working to reduce high blood pressure. · Check how lifestyle changes, such as weight loss and exercise, are affecting blood pressure. How can you prepare for the test? 
· Do not use caffeine, tobacco, or medicines known to raise blood pressure (such as nasal decongestant sprays) for at least 30 minutes before taking your blood pressure. · Do not exercise for at least 30 minutes before taking your blood pressure. What happens before the test? 
Take your blood pressure while you feel comfortable and relaxed. Sit quietly with both feet on the floor for at least 5 minutes before the test. 
What happens during the test? 
· Sit with your arm slightly bent and resting on a table so that your upper arm is at the same level as your heart. · Roll up your sleeve or take off your shirt to expose your upper arm. · Wrap the blood pressure cuff around your upper arm so that the lower edge of the cuff is about 1 inch above the bend of your elbow. Proceed with the following steps depending on if you are using an automatic or manual pressure monitor. Automatic blood pressure monitors · Press the on/off button on the automatic monitor and wait until the ready-to-measure \"heart\" symbol appears next to zero in the display window. · Press the start button. The cuff will inflate and deflate by itself. · Your blood pressure numbers will appear on the screen. · Write your numbers in your log book, along with the date and time. Manual blood pressure monitors · Place the earpieces of a stethoscope in your ears, and place the bell of the stethoscope over the artery, just below the cuff. · Close the valve on the rubber inflating bulb. · Squeeze the bulb rapidly with your opposite hand to inflate the cuff until the dial or column of mercury reads about 30 mm Hg higher than your usual systolic pressure. If you do not know your usual pressure, inflate the cuff to 210 mm Hg or until the pulse at your wrist disappears. · Open the pressure valve just slightly by twisting or pressing the valve on the bulb. · As you watch the pressure slowly fall, note the level on the dial at which you first start to hear a pulsing or tapping sound through the stethoscope. This is your systolic blood pressure. · Continue letting the air out slowly. The sounds will become muffled and will finally disappear.  Note the pressure when the sounds completely disappear. This is your diastolic blood pressure. Let out all the remaining air. · Write your numbers in your log book, along with the date and time. What else should you know about the test? 
Here are the categories of blood pressure for adults: 
Ideal blood pressure. Systolic is less than 499, and diastolic is less than 80. Elevated blood pressure. Systolic is 967 to 911, and diastolic is less than 80. High blood pressure (hypertension). Systolic is 787 or above. Diastolic is 80 or above. One or both numbers may be high. It is more accurate to take the average of several readings made throughout the day than to rely on a single reading. Follow-up care is a key part of your treatment and safety. Be sure to make and go to all appointments, and call your doctor if you are having problems. It's also a good idea to keep a list of the medicines you take. Where can you learn more? Go to http://florin-kevin.info/. Enter C427 in the search box to learn more about \"Home Blood Pressure Test: About This Test.\" Current as of: 2017 Content Version: 11.7 © 1824-6211 [employer]. Care instructions adapted under license by [employer] (which disclaims liability or warranty for this information). If you have questions about a medical condition or this instruction, always ask your healthcare professional. [address] any warranty or liability for your use of this information. Emily Sanders Affiliated with [address] [place] [address] 
[phone] Monitor blood pressure outside the office several times weekly at different times during the day and evening. Bring the record to me in 3 weeks for review. Blood Pressure Record Patient Name:  ______________________ :  ______________________ Date/Time BP Reading Pulse High Blood Pressure: Care Instructions Your Care Instructions If your blood pressure is usually above 130/80, you have high blood pressure, or hypertension. That means the top number is 130 or higher or the bottom number is 80 or higher, or both. Despite what a lot of people think, high blood pressure usually doesn't cause headaches or make you feel dizzy or lightheaded. It usually has no symptoms. But it does increase your risk for heart attack, stroke, and kidney or eye damage. The higher your blood pressure, the more your risk increases. Your doctor will give you a goal for your blood pressure. Your goal will be based on your health and your age. Lifestyle changes, such as eating healthy and being active, are always important to help lower blood pressure. You might also take medicine to reach your blood pressure goal. 
Follow-up care is a key part of your treatment and safety. Be sure to make and go to all appointments, and call your doctor if you are having problems. It's also a good idea to know your test results and keep a list of the medicines you take. How can you care for yourself at home? Medical treatment · If you stop taking your medicine, your blood pressure will go back up. You may take one or more types of medicine to lower your blood pressure. Be safe with medicines. Take your medicine exactly as prescribed. Call your doctor if you think you are having a problem with your medicine. · Talk to your doctor before you start taking aspirin every day. Aspirin can help certain people lower their risk of a heart attack or stroke. But taking aspirin isn't right for everyone, because it can cause serious bleeding. · See your doctor regularly. You may need to see the doctor more often at first or until your blood pressure comes down. · If you are taking blood pressure medicine, talk to your doctor before you take decongestants or anti-inflammatory medicine, such as ibuprofen. Some of these medicines can raise blood pressure. · Learn how to check your blood pressure at home. Lifestyle changes · Stay at a healthy weight. This is especially important if you put on weight around the waist. Losing even 10 pounds can help you lower your blood pressure. · If your doctor recommends it, get more exercise. Walking is a good choice. Bit by bit, increase the amount you walk every day. Try for at least 30 minutes on most days of the week. You also may want to swim, bike, or do other activities. · Avoid or limit alcohol. Talk to your doctor about whether you can drink any alcohol. · Try to limit how much sodium you eat to less than 2,300 milligrams (mg) a day. Your doctor may ask you to try to eat less than 1,500 mg a day. · Eat plenty of fruits (such as bananas and oranges), vegetables, legumes, whole grains, and low-fat dairy products. · Lower the amount of saturated fat in your diet. Saturated fat is found in animal products such as milk, cheese, and meat. Limiting these foods may help you lose weight and also lower your risk for heart disease. · Do not smoke. Smoking increases your risk for heart attack and stroke. If you need help quitting, talk to your doctor about stop-smoking programs and medicines. These can increase your chances of quitting for good. When should you call for help? Call 911 anytime you think you may need emergency care. This may mean having symptoms that suggest that your blood pressure is causing a serious heart or blood vessel problem. Your blood pressure may be over 180/110. 
 For example, call 911 if: 
  · You have symptoms of a heart attack. These may include: ¨ Chest pain or pressure, or a strange feeling in the chest. 
¨ Sweating. ¨ Shortness of breath. ¨ Nausea or vomiting. ¨ Pain, pressure, or a strange feeling in the back, neck, jaw, or upper belly or in one or both shoulders or arms. ¨ Lightheadedness or sudden weakness. ¨ A fast or irregular heartbeat.  
  · You have symptoms of a stroke. These may include: 
¨ Sudden numbness, tingling, weakness, or loss of movement in your face, arm, or leg, especially on only one side of your body. ¨ Sudden vision changes. ¨ Sudden trouble speaking. ¨ Sudden confusion or trouble understanding simple statements. ¨ Sudden problems with walking or balance. ¨ A sudden, severe headache that is different from past headaches.  
  · You have severe back or belly pain.  
 Do not wait until your blood pressure comes down on its own. Get help right away. 
 Call your doctor now or seek immediate care if: 
  · Your blood pressure is much higher than normal (such as 180/110 or higher), but you don't have symptoms.  
  · You think high blood pressure is causing symptoms, such as: ¨ Severe headache. ¨ Blurry vision.  
 Watch closely for changes in your health, and be sure to contact your doctor if: 
  · Your blood pressure measures 140/90 or higher at least 2 times. That means the top number is 140 or higher or the bottom number is 90 or higher, or both.  
  · You think you may be having side effects from your blood pressure medicine.  
  · Your blood pressure is usually normal, but it goes above normal at least 2 times. Where can you learn more? Go to http://florin-kevin.info/. Enter C544 in the search box to learn more about \"High Blood Pressure: Care Instructions. \" Current as of: December 6, 2017 Content Version: 11.7 © 7213-5334 [employer]. Care instructions adapted under license by [employer] (which disclaims liability or warranty for this information).  If you have questions about a medical condition or this instruction, always ask your healthcare professional. [employer] disclaims any warranty or liability for your use of this information. Joint Pain: Care Instructions Your Care Instructions Many people have small aches and pains from overuse or injury to muscles and joints. Joint injuries often happen during sports or recreation, work tasks, or projects around the home. An overuse injury can happen when you put too much stress on a joint or when you do an activity that stresses the joint over and over, such as using the computer or rowing a boat. You can take action at home to help your muscles and joints get better. You should feel better in 1 to 2 weeks, but it can take 3 months or more to heal completely. Follow-up care is a key part of your treatment and safety. Be sure to make and go to all appointments, and call your doctor if you are having problems. It's also a good idea to know your test results and keep a list of the medicines you take. How can you care for yourself at home? · Do not put weight on the injured joint for at least a day or two. · For the first day or two after an injury, do not take hot showers or baths, and do not use hot packs. The heat could make swelling worse. · Put ice or a cold pack on the sore joint for 10 to 20 minutes at a time. Try to do this every 1 to 2 hours for the next 3 days (when you are awake) or until the swelling goes down. Put a thin cloth between the ice and your skin. · Wrap the injury in an elastic bandage. Do not wrap it too tightly because this can cause more swelling. · Prop up the sore joint on a pillow when you ice it or anytime you sit or lie down during the next 3 days. Try to keep it above the level of your heart. This will help reduce swelling. · Take an over-the-counter pain medicine, such as acetaminophen (Tylenol), ibuprofen (Advil, Motrin), or naproxen (Aleve). Read and follow all instructions on the label. · After 1 or 2 days of rest, begin moving the joint gently.  While the joint is still healing, you can begin to exercise using activities that do not strain or hurt the painful joint. When should you call for help? Call your doctor now or seek immediate medical care if: 
  · You have signs of infection, such as: 
¨ Increased pain, swelling, warmth, and redness. ¨ Red streaks leading from the joint. ¨ A fever.  
 Watch closely for changes in your health, and be sure to contact your doctor if: 
  · Your movement or symptoms are not getting better after 1 to 2 weeks of home treatment. Where can you learn more? Go to http://florin-kevin.info/. Enter P205 in the search box to learn more about \"Joint Pain: Care Instructions. \" Current as of: November 29, 2017 Content Version: 11.7 © 4609-9572 [employer]. Care instructions adapted under license by [employer] (which disclaims liability or warranty for this information). If you have questions about a medical condition or this instruction, always ask your healthcare professional. [address] any warranty or liability for your use of this information. Please provide this summary of care documentation to your next provider. Your primary care clinician is listed as Camden Guevara. If you have any questions after today's visit, please call [phone].

## 2018-07-12 NOTE — PROGRESS NOTES
Progress Note    Patient: Siria Fregoso MRN: [id number]  SSN: [id number]    YOB: 1945  Age: 68 y.o. Sex: female        Chief Complaint   Patient presents with    Leg Swelling     BLE         Subjective:     Problems addressed:  Encounter Diagnoses     ICD-10-CM ICD-9-CM   1. Leg swelling M79.89 729.81   2. Essential hypertension I10 401.9   3. Chronic pain of both knees M25.561 719.46    M25.562 338.29    G89.29    4. Fluid retention R60.9 276.69   5. Edema of extremities  R60.0 782.3   6. Labile hypertension R09.89 401.9     69 y/o F with PMH of HTN and chronic knee pain presents with a 1 month hx of bilateral leg swelling, worse at night, better in the morning. She was started on Lasix 20mg once daily on 6/19/2018 which has not adequately controlled the swelling per the pt. She is taking the lasix as prescribed. She denies any SOB, chest pain, fevers, N/V, or dizziness. She has never had swelling like this in the past.     Pt also reports worsening chronic knee pain. Diclofenac did help her before, but is not working as well at this time. She reports 10/10 \"big pain\" in both legs from hips to knees which comes and goes and makes it difficult for her to walk. The pain constant, but worse with movement. Pt confirms taking her lisinopril as prescribed 40mg daily for HTN. She reports getting headaches when she feels her blood pressure is too high. She denies any chest pain, palpitations, focal neurologic deficits, or vision changes. Current and past medical information:    Current Medications after this visit[de-identified]     Current Outpatient Prescriptions   Medication Sig    furosemide (LASIX) 20 mg tablet Take 1 Tab by mouth daily.  diclofenac EC (VOLTAREN) 75 mg EC tablet TAKE ONE TABLET BY MOUTH TWICE DAILY AS NEEDED    lisinopril (PRINIVIL, ZESTRIL) 40 mg tablet Take 1 Tab by mouth daily.  Indications: High Blood Pressure    lurasidone (LATUDA) 80 mg tab tablet Take 1 Tab by mouth two (2) times a day.  ARIPiprazole (ABILIFY) 15 mg tablet Take 1 Tab by mouth daily.  benztropine (COGENTIN) 0.5 mg tablet Take 0.5 mg by mouth two (2) times a day. Together with 1 mg    Cholecalciferol, Vitamin D3, (VITAMIN D3) 2,000 unit cap capsule Take 2,000 Units by mouth daily. Indications: Vitamin D Deficiency    OLANZapine (ZYPREXA) 10 mg tablet Take 10 mg by mouth nightly. No current facility-administered medications for this visit. Patient Active Problem List    Diagnosis Date Noted    Type 2 diabetes with nephropathy ([facility] 06/11/2018    Severe obesity (BMI 35.0-39. 9) with comorbidity ([facility] 04/13/2018    Tardive dyskinesia 11/28/2014    Schizophrenia ([facility] 11/28/2014    Diabetes mellitus type 2, diet-controlled ([facility] 07/25/2012    HTN (hypertension) 07/09/2012    MR (mental retardation) 07/09/2012       Past Medical History:   Diagnosis Date    Hypertension     Psychotic disorder        Allergies   Allergen Reactions    Bactrim [Sulfamethoxazole-Trimethoprim] Nausea Only       Past Surgical History:   Procedure Laterality Date    HX GYN         Social History     Social History    Marital status:      Spouse name: N/A    Number of children: N/A    Years of education: N/A     Social History Main Topics    Smoking status: Former Smoker    Smokeless tobacco: Never Used    Alcohol use No    Drug use: No    Sexual activity: Not Asked     Other Topics Concern    None     Social History Narrative         Review of Systems   Constitutional: Negative for chills and fever. HENT: Negative for hearing loss. Eyes: Negative for double vision, photophobia and pain. Respiratory: Negative for cough and shortness of breath. Cardiovascular: Negative for chest pain and palpitations. Gastrointestinal: Negative for abdominal pain, nausea and vomiting. Musculoskeletal: Positive for joint pain (bilateral knee). Negative for falls.         Bilateral lower extremity swelling Skin: Negative. Neurological: Negative for dizziness and headaches. Objective:     Vitals:    07/12/18 0806   BP: 159/73   Pulse: 76   Resp: 20   Temp: 98.8 °F (37.1 °C)   SpO2: 97%   Weight: 193 lb (87.5 kg)   Height: 5' 2\" (1.575 m)      Body mass index is 35.3 kg/(m^2). Physical Exam   Constitutional: She appears well-developed and well-nourished. No distress. HENT:   Head: Normocephalic and atraumatic. Right Ear: External ear normal.   Left Ear: External ear normal.   Mouth/Throat: Oropharynx is clear and moist. No oropharyngeal exudate. Eyes: Pupils are equal, round, and reactive to light. Neck: No thyromegaly present. Cardiovascular: Normal rate, regular rhythm and normal heart sounds. No murmur heard. Pulmonary/Chest: Breath sounds normal. No respiratory distress. She has no wheezes. Musculoskeletal: She exhibits edema (bilateral lower extremity with 2+ pitting). Lymphadenopathy:     She has no cervical adenopathy. Neurological: She is alert. She has normal reflexes. Antalgic gait   Skin: No rash noted. No erythema. Psychiatric: She has a normal mood and affect. Her behavior is normal.        Health Maintenance Due   Topic Date Due    DTaP/Tdap/Td series (1 - Tdap) 07/07/1966    GLAUCOMA SCREENING Q2Y  07/07/2010    EYE EXAM RETINAL OR DILATED Q1  11/12/2014    FOBT Q 1 YEAR AGE 50-75  02/24/2016    BREAST CANCER SCRN MAMMOGRAM  04/14/2016       Assessment and orders:     Encounter Diagnoses     ICD-10-CM ICD-9-CM   1. Leg swelling M79.89 729.81   2. Essential hypertension I10 401.9   3. Chronic pain of both knees M25.561 719.46    M25.562 338.29    G89.29    4. Fluid retention R60.9 276.69   5. Edema of extremities  R60.0 782.3   6. Labile hypertension R09.89 401.9     67 y/o F with new onset uncontrolled lower extremity swelling for 1 month with a PMH of chronic knee pain and HTN.       1. Leg swelling: New onset leg swelling 1 month ago, started Lasix 20mg daily on 6/19 with little improvement. Will increase Lasix to 40mg daily. Will get chest xray today and f/u on results. 2. Chronic Pain of both knees: will discontinue diclofenac as it counteracts Lasix. The patient will replace with Tylenol arthritis 650mg q8 PRN over the counter for her knee pain. Will get bilateral knee xrays today and f/u on results. Pt offered an orthopedic referral, but declines at this time. 3. HTN: Labile, slightly elevated today at 159/73. Will continue lisinopril 40mg daily and will order CBC, and CMP and f/u with results. Plan of care:  Discussed diagnoses in detail with patient. Medication risks/benefits/side effects discussed with patient. All of the patient's questions were addressed. The patient understands and agrees with our plan of care. The patient knows to call back if they are unsure of or forget any changes we discussed today or if the symptoms change. The patient received an After-Visit Summary which contains VS, orders, medication list and allergy list. This can be used as a \"mini-medical record\" should they have to seek medical care while out of town. Follow-up Disposition:  Return in about 2 weeks (around 7/26/2018), or if symptoms worsen or fail to improve.     Future Appointments  Date Time Provider [place] Jackson   7/12/2018 9:30 AM [facility] RAD XR RM 1 [facility]   7/12/2018 9:35 AM [facility] RAD XR RM 1 [facility]       Signed By: Elizabeth Herrera MD     July 12, 2018

## 2018-07-13 ENCOUNTER — TELEPHONE (OUTPATIENT)
Dept: FAMILY MEDICINE CLINIC | Age: 73
End: 2018-07-13

## 2018-07-13 LAB
BUN SERPL-MCNC: 12 MG/DL (ref 8–27)
BUN/CREAT SERPL: 15 (ref 12–28)
CALCIUM SERPL-MCNC: 9.7 MG/DL (ref 8.7–10.3)
CHLORIDE SERPL-SCNC: 99 MMOL/L (ref 96–106)
CO2 SERPL-SCNC: 25 MMOL/L (ref 20–29)
CREAT SERPL-MCNC: 0.8 MG/DL (ref 0.57–1)
ERYTHROCYTE [DISTWIDTH] IN BLOOD BY AUTOMATED COUNT: 15.5 % (ref 12.3–15.4)
GLUCOSE SERPL-MCNC: 101 MG/DL (ref 65–99)
HCT VFR BLD AUTO: 29.7 % (ref 34–46.6)
HGB BLD-MCNC: 9.6 G/DL (ref 11.1–15.9)
MCH RBC QN AUTO: 30 PG (ref 26.6–33)
MCHC RBC AUTO-ENTMCNC: 32.3 G/DL (ref 31.5–35.7)
MCV RBC AUTO: 93 FL (ref 79–97)
PLATELET # BLD AUTO: 298 X10E3/UL (ref 150–379)
POTASSIUM SERPL-SCNC: 4.3 MMOL/L (ref 3.5–5.2)
RBC # BLD AUTO: 3.2 X10E6/UL (ref 3.77–5.28)
SODIUM SERPL-SCNC: 141 MMOL/L (ref 134–144)
WBC # BLD AUTO: 5 X10E3/UL (ref 3.4–10.8)

## 2018-07-13 NOTE — PROGRESS NOTES
Results noted. Pt was called to discuss results and counseled on reasons to follow up at clinic.      Cata Gilmore MD  PGY1 Family Medicine Resident

## 2018-07-13 NOTE — PROGRESS NOTES
I saw and evaluated the patient with the resident, performing the key elements of the exam and service. I discussed the findings, assessment and plan with the resident and agree with the resident's findings and plan as documented in the resident's note. Please note the new anemia. Ari Cross M.D.

## 2018-07-13 NOTE — TELEPHONE ENCOUNTER
Called patient to discuss lab and imaging results and recommendations. She voiced her understanding and that she would continue to follow up at our clinic.      Mario Sood MD  3:31 PM

## 2018-07-16 ENCOUNTER — TELEPHONE (OUTPATIENT)
Dept: FAMILY MEDICINE CLINIC | Age: 73
End: 2018-07-16

## 2018-07-16 DIAGNOSIS — M17.0 OSTEOARTHRITIS OF BOTH KNEES, UNSPECIFIED OSTEOARTHRITIS TYPE: Primary | ICD-10-CM

## 2018-07-16 NOTE — TELEPHONE ENCOUNTER
Pt states that she will need the orders to go to [address] for her knees now. She is not getting any relief.  Thanks

## 2018-08-27 DIAGNOSIS — I10 ESSENTIAL HYPERTENSION: ICD-10-CM

## 2018-08-27 RX ORDER — LISINOPRIL 40 MG/1
TABLET ORAL
Qty: 90 TAB | Refills: 0 | Status: SHIPPED | OUTPATIENT
Start: 2018-08-27 | End: 2019-01-09 | Stop reason: SDUPTHER

## 2018-09-21 ENCOUNTER — OFFICE VISIT (OUTPATIENT)
Dept: FAMILY MEDICINE CLINIC | Age: 73
End: 2018-09-21

## 2018-09-21 VITALS
DIASTOLIC BLOOD PRESSURE: 59 MMHG | WEIGHT: 193 LBS | HEIGHT: 62 IN | BODY MASS INDEX: 35.51 KG/M2 | TEMPERATURE: 98.1 F | SYSTOLIC BLOOD PRESSURE: 138 MMHG | HEART RATE: 77 BPM | RESPIRATION RATE: 18 BRPM | OXYGEN SATURATION: 97 %

## 2018-09-21 DIAGNOSIS — D64.9 ANEMIA, UNSPECIFIED TYPE: ICD-10-CM

## 2018-09-21 DIAGNOSIS — M21.062 VALGUS DEFORMITY, NOT ELSEWHERE CLASSIFIED, LEFT KNEE: ICD-10-CM

## 2018-09-21 DIAGNOSIS — Z23 ENCOUNTER FOR IMMUNIZATION: ICD-10-CM

## 2018-09-21 DIAGNOSIS — D51.3 OTHER DIETARY VITAMIN B12 DEFICIENCY ANEMIA: ICD-10-CM

## 2018-09-21 DIAGNOSIS — D53.9 NUTRITIONAL ANEMIA: ICD-10-CM

## 2018-09-21 DIAGNOSIS — M79.89 LEG SWELLING: Primary | ICD-10-CM

## 2018-09-21 DIAGNOSIS — M17.0 OSTEOARTHRITIS OF BOTH KNEES, UNSPECIFIED OSTEOARTHRITIS TYPE: ICD-10-CM

## 2018-09-21 PROBLEM — M17.12 PRIMARY OSTEOARTHRITIS OF LEFT KNEE: Status: ACTIVE | Noted: 2018-09-21

## 2018-09-21 NOTE — PROGRESS NOTES
1. Have you been to the ER, urgent care clinic, or been hospitalized since your last visit? No  
 
2. Have you seen or consulted any other health care providers outside of the [address] [place] [address] since your last visit? No  
 
opportunity was given for questions Goals that were addressed and/or need to be completed during or after this appointment include Health Maintenance Due Topic Date Due  
 DTaP/Tdap/Td series (1 - Tdap) 07/07/1966  GLAUCOMA SCREENING Q2Y  07/07/2010  
 EYE EXAM RETINAL OR DILATED Q1  11/12/2014  
 FOBT Q 1 YEAR AGE 50-75  02/24/2016  BREAST CANCER SCRN MAMMOGRAM  04/14/2016  Influenza Age 5 to Adult  08/01/2018

## 2018-09-21 NOTE — PATIENT INSTRUCTIONS
Anemia: Care Instructions Your Care Instructions Anemia is a low level of red blood cells, which carry oxygen throughout your body. Many things can cause anemia. Lack of iron is one of the most common causes. Your body needs iron to make hemoglobin, a substance in red blood cells that carries oxygen from the lungs to your body's cells. Without enough iron, the body produces fewer and smaller red blood cells. As a result, your body's cells do not get enough oxygen, and you feel tired and weak. And you may have trouble concentrating. Bleeding is the most common cause of a lack of iron. You may have heavy menstrual bleeding or bleeding caused by conditions such as ulcers, hemorrhoids, or cancer. Regular use of aspirin or other anti-inflammatory medicines (such as ibuprofen) also can cause bleeding in some people. A lack of iron in your diet also can cause anemia, especially at times when the body needs more iron, such as during pregnancy, infancy, and the teen years. Your doctor may have prescribed iron pills. It may take several months of treatment for your iron levels to return to normal. Your doctor also may suggest that you eat foods that are rich in iron, such as meat and beans. There are many other causes of anemia. It is not always due to a lack of iron. Finding the specific cause of your anemia will help your doctor find the right treatment for you. Follow-up care is a key part of your treatment and safety. Be sure to make and go to all appointments, and call your doctor if you are having problems. It's also a good idea to know your test results and keep a list of the medicines you take. How can you care for yourself at home? · Take your medicines exactly as prescribed. Call your doctor if you think you are having a problem with your medicine. · If your doctor recommends iron pills, take them as directed: ¨ Try to take the pills on an empty stomach about 1 hour before or 2 hours after meals. But you may need to take iron with food to avoid an upset stomach. ¨ Do not take antacids or drink milk or caffeine drinks (such as coffee, tea, or cola) at the same time or within 2 hours of the time that you take your iron. They can make it hard for your body to absorb the iron. ¨ Vitamin C (from food or supplements) helps your body absorb iron. Try taking iron pills with a glass of orange juice or some other food that is high in vitamin C, such as citrus fruits. ¨ Iron pills may cause stomach problems, such as heartburn, nausea, diarrhea, constipation, and cramps. Be sure to drink plenty of fluids, and include fruits, vegetables, and fiber in your diet each day. Iron pills often make your bowel movements dark or green. ¨ If you forget to take an iron pill, do not take a double dose of iron the next time you take a pill. ¨ Keep iron pills out of the reach of small children. An overdose of iron can be very dangerous. · Follow your doctor's advice about eating iron-rich foods. These include red meat, shellfish, poultry, eggs, beans, raisins, whole-grain bread, and leafy green vegetables. · Steam vegetables to help them keep their iron content. When should you call for help? Call 911 anytime you think you may need emergency care. For example, call if: 
  · You have symptoms of a heart attack. These may include: ¨ Chest pain or pressure, or a strange feeling in the chest. 
¨ Sweating. ¨ Shortness of breath. ¨ Nausea or vomiting. ¨ Pain, pressure, or a strange feeling in the back, neck, jaw, or upper belly or in one or both shoulders or arms. ¨ Lightheadedness or sudden weakness. ¨ A fast or irregular heartbeat. After you call 911, the  may tell you to chew 1 adult-strength or 2 to 4 low-dose aspirin. Wait for an ambulance. Do not try to drive yourself.  
  · You passed out (lost consciousness).  
 Call your doctor now or seek immediate medical care if:   · You have new or increased shortness of breath.  
  · You are dizzy or lightheaded, or you feel like you may faint.  
  · Your fatigue and weakness continue or get worse.  
  · You have any abnormal bleeding, such as: 
¨ Nosebleeds. ¨ Vaginal bleeding that is different (heavier, more frequent, at a different time of the month) than what you are used to. ¨ Bloody or black stools, or rectal bleeding. ¨ Bloody or pink urine.  
 Watch closely for changes in your health, and be sure to contact your doctor if: 
  · You do not get better as expected. Where can you learn more? Go to http://florin-kevin.info/. Enter R301 in the search box to learn more about \"Anemia: Care Instructions. \" Current as of: October 9, 2017 Content Version: 11.7 © 8394-3240 [employer]. Care instructions adapted under license by [employer] (which disclaims liability or warranty for this information). If you have questions about a medical condition or this instruction, always ask your healthcare professional. [place] [address] any warranty or liability for your use of this information. Influenza (Flu) Vaccine (Inactivated or Recombinant): What You Need to Know Why get vaccinated? Influenza (\"flu\") is a contagious disease that spreads around the [place] every winter, usually between October and May. Flu is caused by influenza viruses and is spread mainly by coughing, sneezing, and close contact. Anyone can get flu. Flu strikes suddenly and can last several days. Symptoms vary by age, but can include: · Fever/chills. · Sore throat. · Muscle aches. · Fatigue. · Cough. · Headache. · Runny or stuffy nose. Flu can also lead to pneumonia and blood infections, and cause diarrhea and seizures in children. If you have a medical condition, such as heart or lung disease, flu can make it worse. Flu is more dangerous for some people. Infants and young children, people 72years of age and older, pregnant women, and people with certain health conditions or a weakened immune system are at greatest risk. Each year thousands of people in the [facility] die from flu, and many more are hospitalized. Flu vaccine can: · Keep you from getting flu. · Make flu less severe if you do get it. · Keep you from spreading flu to your family and other people. Inactivated and recombinant flu vaccines A dose of flu vaccine is recommended every flu season. Children 6 months through 6years of age may need two doses during the same flu season. Everyone else needs only one dose each flu season. Some inactivated flu vaccines contain a very small amount of a mercury-based preservative called thimerosal. Studies have not shown thimerosal in vaccines to be harmful, but flu vaccines that do not contain thimerosal are available. There is no live flu virus in flu shots. They cannot cause the flu. There are many flu viruses, and they are always changing. Each year a new flu vaccine is made to protect against three or four viruses that are likely to cause disease in the upcoming flu season. But even when the vaccine doesn't exactly match these viruses, it may still provide some protection. Flu vaccine cannot prevent: · Flu that is caused by a virus not covered by the vaccine. · Illnesses that look like flu but are not. Some people should not get this vaccine Tell the person who is giving you the vaccine: · If you have any severe (life-threatening) allergies. If you ever had a life-threatening allergic reaction after a dose of flu vaccine, or have a severe allergy to any part of this vaccine, you may be advised not to get vaccinated. Most, but not all, types of flu vaccine contain a small amount of egg protein.  
· If you ever had Guillain-Barré syndrome (also called GBS) Some people with a history of GBS should not get this vaccine. This should be discussed with your doctor. · If you are not feeling well. It is usually okay to get flu vaccine when you have a mild illness, but you might be asked to come back when you feel better. Risks of a vaccine reaction With any medicine, including vaccines, there is a chance of reactions. These are usually mild and go away on their own, but serious reactions are also possible. Most people who get a flu shot do not have any problems with it. Minor problems following a flu shot include: · Soreness, redness, or swelling where the shot was given · Hoarseness · Sore, red or itchy eyes · Cough · Fever · Aches · Headache · Itching · Fatigue If these problems occur, they usually begin soon after the shot and last 1 or 2 days. More serious problems following a flu shot can include the following: · There may be a small increased risk of Guillain-Barré Syndrome (GBS) after inactivated flu vaccine. This risk has been estimated at 1 or 2 additional cases per million people vaccinated. This is much lower than the risk of severe complications from flu, which can be prevented by flu vaccine. · Macy Flowersr children who get the flu shot along with pneumococcal vaccine (PCV13) and/or DTaP vaccine at the same time might be slightly more likely to have a seizure caused by fever. Ask your doctor for more information. Tell your doctor if a child who is getting flu vaccine has ever had a seizure Problems that could happen after any injected vaccine: · People sometimes faint after a medical procedure, including vaccination. Sitting or lying down for about 15 minutes can help prevent fainting, and injuries caused by a fall. Tell your doctor if you feel dizzy, or have vision changes or ringing in the ears. · Some people get severe pain in the shoulder and have difficulty moving the arm where a shot was given. This happens very rarely. · Any medication can cause a severe allergic reaction. Such reactions from a vaccine are very rare, estimated at about 1 in a million doses, and would happen within a few minutes to a few hours after the vaccination. As with any medicine, there is a very remote chance of a vaccine causing a serious injury or death. The safety of vaccines is always being monitored. For more information, visit: www.cdc.gov/vaccinesafety/. What if there is a serious reaction? What should I look for? · Look for anything that concerns you, such as signs of a severe allergic reaction, very high fever, or unusual behavior. Signs of a severe allergic reaction can include hives, swelling of the face and throat, difficulty breathing, a fast heartbeat, dizziness, and weakness - usually within a few minutes to a few hours after the vaccination. What should I do? · If you think it is a severe allergic reaction or other emergency that can't wait, call 9-1-1 and get the person to the nearest hospital. Otherwise, call your doctor. · Reactions should be reported to the \"Vaccine Adverse Event Reporting System\" (VAERS). Your doctor should file this report, or you can do it yourself through the VAERS website at www.vaers. [facility].gov, or by calling 1-581.695.9721. [employer] does not give medical advice. The National Vaccine Injury Compensation Program 
The National Vaccine Injury Compensation Program (VICP) is a federal program that was created to compensate people who may have been injured by certain vaccines. Persons who believe they may have been injured by a vaccine can learn about the program and about filing a claim by calling 2-800.119.8098 or visiting the [employer] website at www.[facility].gov/vaccinecompensation. There is a time limit to file a claim for compensation. How can I learn more? · Ask your healthcare provider. He or she can give you the vaccine package insert or suggest other sources of information. · Call your local or state health department. · Contact the Centers for Disease Control and Prevention (CDC): 
¨ Call 7-288.718.9547 (1-800-CDC-INFO) or ¨ Visit CDC's website at www.cdc.gov/flu Vaccine Information Statement Inactivated Influenza Vaccine 8/7/2015) [address] [id number] Department of [facility] and [place] Centers for Disease Control and Prevention Many Vaccine Information Statements are available in [place] and other languages. See www.immunize.org/vis. Muchas hojas de información sobre vacunas están disponibles en español y en otros idiomas. Visite www.immunize.org/vis. Care instructions adapted under license by [employer] (which disclaims liability or warranty for this information). If you have questions about a medical condition or this instruction, always ask your healthcare professional. [address] any warranty or liability for your use of this information.

## 2018-09-21 NOTE — PROGRESS NOTES
Loc Kwon 68 y.o. female 1945 
[address] 
[phone] [facility] Practice: Progress Note Encounter Date: 9/21/2018 Chief Complaint Patient presents with  Leg Swelling  
  follow up  Immunization/Injection  
  flu vaccine History provided by patient and daughter History of Present Illness Loc Kwon is a 68 y.o. female who presents to clinic today for: 
 
Leg swelling/osteoarthritis. Patient present with cc of bilateral knee and leg swelling. Patient reports that she has been to see orthopedic surgeon; per patient he wants to get imaging of her knee prior to surgery. She states that she also needs to see a cardiologist for pre-op; requesting referral to her daughter's cardiologist in [place]. Anemia Patient recent blood work in July showed Hgb drop to 9.6 from 11.4 in April. She is overdue for FOBT testing. Roderick any abdominal pain, BRBPR, melena. Has been using NSAIDs intermittently for knee pain over past 6 months. Review of Systems Review of Systems Cardiovascular: Positive for leg swelling. Musculoskeletal: Positive for joint pain. Negative for falls. Neurological: Negative for dizziness and headaches. Vitals/Objective:  
 
Vitals:  
 09/21/18 3330 BP: 138/59 Pulse: 77 Resp: 18 Temp: 98.1 °F (36.7 °C) TempSrc: Oral  
SpO2: 97% Weight: 193 lb (87.5 kg) Height: 5' 2\" (1.575 m) Body mass index is 35.3 kg/(m^2). Wt Readings from Last 3 Encounters:  
09/21/18 193 lb (87.5 kg) 07/12/18 193 lb (87.5 kg) 06/11/18 200 lb 6.4 oz (90.9 kg) Physical Exam  
Constitutional: She appears well-developed and well-nourished. Cardiovascular: Normal rate and regular rhythm. Pulmonary/Chest: Effort normal and breath sounds normal.  
Musculoskeletal:  
     Left knee: She exhibits decreased range of motion, effusion and abnormal alignment (valgus). She exhibits no ecchymosis. Tenderness found. Medial joint line and lateral joint line tenderness noted. Right ankle: She exhibits swelling (non-pitting edema). Left ankle: She exhibits swelling (nonpitting edema). No results found for this or any previous visit (from the past 24 hour(s)). Assessment and Plan:  
 
Encounter Diagnoses ICD-10-CM ICD-9-CM 1. Leg swelling M79.89 729.81  
2. Osteoarthritis of both knees, unspecified osteoarthritis type M17.0 715.96  
3. Valgus deformity, not elsewhere classified, left knee M21.062 736.41  
4. Anemia, unspecified type D64.9 285.9 5. Other dietary vitamin B12 deficiency anemia  D51.3 281.1 6. Nutritional anemia  D53.9 281.9 7. Encounter for immunization Z23 V03.89  
 
1. Osteoarthritis of both knees, unspecified osteoarthritis type 2. Valgus deformity, not elsewhere classified, left knee 3. Leg swelling Patient is exploring possible knee replacement given severity of arthritis and pain as conservative measure have failed to reduce pain. She is scheduled for a CT of knee in November. Requests referral to daughter's cardiologist in [address] [place] [address] for pre-op risk stratification.  
- REFERRAL TO CARDIOLOGY 4. Anemia, unspecified type 5. Other dietary vitamin B12 deficiency anemia 6. Nutritional anemia Repeat CBC for confirm anemia and further blood work to characterize type of anemia.   
- CBC WITH AUTOMATED DIFF 
- IRON PROFILE 
- FERRITIN 
- FOLATE 
- RETICULOCYTE COUNT 
- VITAMIN B12 
- OCCULT BLOOD IMMUNOASSAY,DIAGNOSTIC 7. Encounter for immunization - ADMIN INFLUENZA VIRUS VAC 
- INFLUENZA VACCINE INACTIVATED (IIV), SUBUNIT, ADJUVANTED, IM I have discussed the diagnosis with the patient and the intended plan as seen in the above orders. she has expressed understanding. The patient has received an after-visit summary and questions were answered concerning future plans. I have discussed medication side effects and warnings with the patient as well. Electronically Signed: Raman Herrera MD 
  
History/Allergies Patients past medical, surgical and family histories were reviewed and updated. Past Medical History:  
Diagnosis Date  Hypertension  Psychotic disorder Past Surgical History:  
Procedure Laterality Date  HX GYN No family history on file. Social History Social History  Marital status:  Spouse name: N/A  
 Number of children: N/A  
 Years of education: N/A Occupational History  Not on file. Social History Main Topics  Smoking status: Former Smoker  Smokeless tobacco: Never Used  Alcohol use No  
 Drug use: No  
 Sexual activity: Not on file Other Topics Concern  Not on file Social History Narrative Allergies Allergen Reactions  Bactrim [Sulfamethoxazole-Trimethoprim] Nausea Only Disposition Follow-up Disposition: 
Return if symptoms worsen or fail to improve. Future Appointments Date Time Provider Clovis Jackson 11/5/2018 9:30 AM [facility] Current Medications after this visit Current Outpatient Prescriptions Medication Sig  
 lisinopril (PRINIVIL, ZESTRIL) 40 mg tablet TAKE ONE TABLET BY MOUTH EVERY DAY  furosemide (LASIX) 40 mg tablet Take 1 Tab by mouth daily.  lurasidone (LATUDA) 80 mg tab tablet Take 1 Tab by mouth two (2) times a day.  Cholecalciferol, Vitamin D3, (VITAMIN D3) 2,000 unit cap capsule Take 2,000 Units by mouth daily. Indications: Vitamin D Deficiency No current facility-administered medications for this visit. Medications Discontinued During This Encounter Medication Reason  ARIPiprazole (ABILIFY) 15 mg tablet Discontinued by Another Clinician  benztropine (COGENTIN) 0.5 mg tablet Discontinued by Another Clinician  OLANZapine (ZYPREXA) 10 mg tablet Discontinued by Another Clinician

## 2018-09-21 NOTE — MR AVS SNAPSHOT
[address] [place] [address] 
[phone] Patient: Deep Kincaid MRN: [id number] UTU:[birth year] Visit Information Date & Time Provider Department Dept. Phone Encounter #  
 9/21/2018  9:30 AM Emilee Isaac MD [address] [place] [address] [phone] [id number] Follow-up Instructions Return if symptoms worsen or fail to improve. Upcoming Health Maintenance Date Due DTaP/Tdap/Td series (1 - Tdap) 7/7/1966 GLAUCOMA SCREENING Q2Y 7/7/2010 EYE EXAM RETINAL OR DILATED Q1 11/12/2014 FOBT Q 1 YEAR AGE 50-75 2/24/2016 BREAST CANCER SCRN MAMMOGRAM 4/14/2016 Influenza Age 5 to Adult 8/1/2018 HEMOGLOBIN A1C Q6M 11/24/2018 FOOT EXAM Q1 1/24/2019 MICROALBUMIN Q1 5/24/2019 LIPID PANEL Q1 5/24/2019 Allergies as of 9/21/2018  Review Complete On: 9/21/2018 By: Naseem Shin LPN Severity Noted Reaction Type Reactions Bactrim [Sulfamethoxazole-trimethoprim]  06/27/2013    Nausea Only Current Immunizations  Reviewed on 10/13/2016 Name Date Influenza High Dose Vaccine PF 10/2/2017 Influenza Vaccine (Quad) PF 10/13/2016, 2/9/2016, 9/25/2014 Influenza Vaccine (Tri) Adjuvanted  Incomplete Influenza Vaccine Split 10/16/2012 Pneumococcal Conjugate (PCV-13) 10/24/2017 Pneumococcal Polysaccharide (PPSV-23) 10/22/2013 Not reviewed this visit You Were Diagnosed With   
  
 Codes Comments Leg swelling    -  Primary ICD-10-CM: M79.89 ICD-9-CM: 729.81 Osteoarthritis of both knees, unspecified osteoarthritis type     ICD-10-CM: M17.0 ICD-9-CM: 715.96 Anemia, unspecified type     ICD-10-CM: D64.9 ICD-9-CM: 285.9 Encounter for immunization     ICD-10-CM: R32 ICD-9-CM: V03.89 Other dietary vitamin B12 deficiency anemia     ICD-10-CM: D51.3 ICD-9-CM: 281.1 Nutritional anemia     ICD-10-CM: D53.9 ICD-9-CM: 232. 9 Vitals BP Pulse Temp Resp Height(growth percentile) Weight(growth percentile) 138/59 77 98.1 °F (36.7 °C) (Oral) 18 5' 2\" (1.575 m) 193 lb (87.5 kg) SpO2 BMI OB Status Smoking Status 97% 35.3 kg/m2 Postmenopausal Former Smoker Vitals History BMI and BSA Data Body Mass Index Body Surface Area  
 35.3 kg/m 2 1.96 m 2 Preferred Pharmacy Pharmacy Name Phone [address] [place], [place] - [address] [phone] Your Updated Medication List  
  
   
This list is accurate as of 9/21/18 10:07 AM.  Always use your most recent med list.  
  
  
  
  
 ARIPiprazole 15 mg tablet Commonly known as:  ABILIFY Take 1 Tab by mouth daily. benztropine 0.5 mg tablet Commonly known as:  COGENTIN Take 0.5 mg by mouth two (2) times a day. Together with 1 mg Cholecalciferol (Vitamin D3) 2,000 unit Cap capsule Commonly known as:  VITAMIN D3 Take 2,000 Units by mouth daily. Indications: Vitamin D Deficiency  
  
 furosemide 40 mg tablet Commonly known as:  LASIX Take 1 Tab by mouth daily. LATUDA 80 mg Tab tablet Generic drug:  lurasidone Take 1 Tab by mouth two (2) times a day. lisinopril 40 mg tablet Commonly known as:  PRINIVIL, ZESTRIL  
TAKE ONE TABLET BY MOUTH EVERY DAY  
  
 OLANZapine 10 mg tablet Commonly known as:  ZyPREXA Take 10 mg by mouth nightly. We Performed the Following ADMIN INFLUENZA VIRUS VAC [ HCPCS] CBC WITH AUTOMATED DIFF [66410 CPT(R)] FERRITIN [16799 CPT(R)] FOLATE N8094810 CPT(R)] INFLUENZA VACCINE INACTIVATED (IIV), SUBUNIT, ADJUVANTED, IM [id number] CPT(R)] IRON PROFILE P423831 CPT(R)] OCCULT BLOOD IMMUNOASSAY,DIAGNOSTIC [15009 CPT(R)] REFERRAL TO CARDIOLOGY [GCA73 Custom] Prabhjot Rahman, Patient preference. Pre-op knee replacement. RETICULOCYTE COUNT [id number] CPT(R)] VITAMIN B12 [id number] CPT(R)] Follow-up Instructions Return if symptoms worsen or fail to improve. To-Do List   
 11/05/2018 9:30 AM  
  Appointment with [facility] CT 1 at [facility] ([phone]) NON-CONTRAST STUDY: 1. [facility] any [facility] films/images pertaining to the area of interest with you on the day of appointment. 2. Check in at registration at least 30 minutes before appt time unless you were instructed to do otherwise. 3. If you have to drink oral contrast please pick it up any weekday prior to your appointment, if you cannot please check in 2 hrs  before appt time. Referral Information Referral ID Referred By Referred To  
  
 [id number] Angela Shannon MD   
   [address] [facility] 1st Floor [address], [address] Phone: [phone] Fax: [phone] Visits Status Start Date End Date 1 New Request 9/21/18 9/21/19 If your referral has a status of pending review or denied, additional information will be sent to support the outcome of this decision. Patient Instructions Anemia: Care Instructions Your Care Instructions Anemia is a low level of red blood cells, which carry oxygen throughout your body. Many things can cause anemia. Lack of iron is one of the most common causes. Your body needs iron to make hemoglobin, a substance in red blood cells that carries oxygen from the lungs to your body's cells. Without enough iron, the body produces fewer and smaller red blood cells. As a result, your body's cells do not get enough oxygen, and you feel tired and weak. And you may have trouble concentrating. Bleeding is the most common cause of a lack of iron. You may have heavy menstrual bleeding or bleeding caused by conditions such as ulcers, hemorrhoids, or cancer. Regular use of aspirin or other anti-inflammatory medicines (such as ibuprofen) also can cause bleeding in some people.  A lack of iron in your diet also can cause anemia, especially at times when the body needs more iron, such as during pregnancy, infancy, and the teen years. Your doctor may have prescribed iron pills. It may take several months of treatment for your iron levels to return to normal. Your doctor also may suggest that you eat foods that are rich in iron, such as meat and beans. There are many other causes of anemia. It is not always due to a lack of iron. Finding the specific cause of your anemia will help your doctor find the right treatment for you. Follow-up care is a key part of your treatment and safety. Be sure to make and go to all appointments, and call your doctor if you are having problems. It's also a good idea to know your test results and keep a list of the medicines you take. How can you care for yourself at home? · Take your medicines exactly as prescribed. Call your doctor if you think you are having a problem with your medicine. · If your doctor recommends iron pills, take them as directed: ¨ Try to take the pills on an empty stomach about 1 hour before or 2 hours after meals. But you may need to take iron with food to avoid an upset stomach. ¨ Do not take antacids or drink milk or caffeine drinks (such as coffee, tea, or cola) at the same time or within 2 hours of the time that you take your iron. They can make it hard for your body to absorb the iron. ¨ Vitamin C (from food or supplements) helps your body absorb iron. Try taking iron pills with a glass of orange juice or some other food that is high in vitamin C, such as citrus fruits. ¨ Iron pills may cause stomach problems, such as heartburn, nausea, diarrhea, constipation, and cramps. Be sure to drink plenty of fluids, and include fruits, vegetables, and fiber in your diet each day. Iron pills often make your bowel movements dark or green. ¨ If you forget to take an iron pill, do not take a double dose of iron the next time you take a pill. ¨ Keep iron pills out of the reach of small children. An overdose of iron can be very dangerous. · Follow your doctor's advice about eating iron-rich foods. These include red meat, shellfish, poultry, eggs, beans, raisins, whole-grain bread, and leafy green vegetables. · Steam vegetables to help them keep their iron content. When should you call for help? Call 911 anytime you think you may need emergency care. For example, call if: 
  · You have symptoms of a heart attack. These may include: ¨ Chest pain or pressure, or a strange feeling in the chest. 
¨ Sweating. ¨ Shortness of breath. ¨ Nausea or vomiting. ¨ Pain, pressure, or a strange feeling in the back, neck, jaw, or upper belly or in one or both shoulders or arms. ¨ Lightheadedness or sudden weakness. ¨ A fast or irregular heartbeat. After you call 911, the  may tell you to chew 1 adult-strength or 2 to 4 low-dose aspirin. Wait for an ambulance. Do not try to drive yourself.  
  · You passed out (lost consciousness).  
 Call your doctor now or seek immediate medical care if: 
  · You have new or increased shortness of breath.  
  · You are dizzy or lightheaded, or you feel like you may faint.  
  · Your fatigue and weakness continue or get worse.  
  · You have any abnormal bleeding, such as: 
¨ Nosebleeds. ¨ Vaginal bleeding that is different (heavier, more frequent, at a different time of the month) than what you are used to. ¨ Bloody or black stools, or rectal bleeding. ¨ Bloody or pink urine.  
 Watch closely for changes in your health, and be sure to contact your doctor if: 
  · You do not get better as expected. Where can you learn more? Go to http://florin-kevin.info/. Enter R301 in the search box to learn more about \"Anemia: Care Instructions. \" Current as of: October 9, 2017 Content Version: 11.7 © 9273-5465 [employer].  Care instructions adapted under license by [facility] Wilson (which disclaims liability or warranty for this information). If you have questions about a medical condition or this instruction, always ask your healthcare professional. [address] any warranty or liability for your use of this information. Influenza (Flu) Vaccine (Inactivated or Recombinant): What You Need to Know Why get vaccinated? Influenza (\"flu\") is a contagious disease that spreads around the [facility] every winter, usually between October and May. Flu is caused by influenza viruses and is spread mainly by coughing, sneezing, and close contact. Anyone can get flu. Flu strikes suddenly and can last several days. Symptoms vary by age, but can include: · Fever/chills. · Sore throat. · Muscle aches. · Fatigue. · Cough. · Headache. · Runny or stuffy nose. Flu can also lead to pneumonia and blood infections, and cause diarrhea and seizures in children. If you have a medical condition, such as heart or lung disease, flu can make it worse. Flu is more dangerous for some people. Infants and young children, people 72years of age and older, pregnant women, and people with certain health conditions or a weakened immune system are at greatest risk. Each year thousands of people in the [facility] die from flu, and many more are hospitalized. Flu vaccine can: · Keep you from getting flu. · Make flu less severe if you do get it. · Keep you from spreading flu to your family and other people. Inactivated and recombinant flu vaccines A dose of flu vaccine is recommended every flu season. Children 6 months through 6years of age may need two doses during the same flu season. Everyone else needs only one dose each flu season.  
Some inactivated flu vaccines contain a very small amount of a mercury-based preservative called thimerosal. Studies have not shown thimerosal in vaccines to be harmful, but flu vaccines that do not contain thimerosal are available. There is no live flu virus in flu shots. They cannot cause the flu. There are many flu viruses, and they are always changing. Each year a new flu vaccine is made to protect against three or four viruses that are likely to cause disease in the upcoming flu season. But even when the vaccine doesn't exactly match these viruses, it may still provide some protection. Flu vaccine cannot prevent: · Flu that is caused by a virus not covered by the vaccine. · Illnesses that look like flu but are not. Some people should not get this vaccine Tell the person who is giving you the vaccine: · If you have any severe (life-threatening) allergies. If you ever had a life-threatening allergic reaction after a dose of flu vaccine, or have a severe allergy to any part of this vaccine, you may be advised not to get vaccinated. Most, but not all, types of flu vaccine contain a small amount of egg protein. · If you ever had Guillain-Barré syndrome (also called GBS) Some people with a history of GBS should not get this vaccine. This should be discussed with your doctor. · If you are not feeling well. It is usually okay to get flu vaccine when you have a mild illness, but you might be asked to come back when you feel better. Risks of a vaccine reaction With any medicine, including vaccines, there is a chance of reactions. These are usually mild and go away on their own, but serious reactions are also possible. Most people who get a flu shot do not have any problems with it. Minor problems following a flu shot include: · Soreness, redness, or swelling where the shot was given · Hoarseness · Sore, red or itchy eyes · Cough · Fever · Aches · Headache · Itching · Fatigue If these problems occur, they usually begin soon after the shot and last 1 or 2 days. More serious problems following a flu shot can include the following: · There may be a small increased risk of Guillain-Barré Syndrome (GBS) after inactivated flu vaccine. This risk has been estimated at 1 or 2 additional cases per million people vaccinated. This is much lower than the risk of severe complications from flu, which can be prevented by flu vaccine. · Debo Bur children who get the flu shot along with pneumococcal vaccine (PCV13) and/or DTaP vaccine at the same time might be slightly more likely to have a seizure caused by fever. Ask your doctor for more information. Tell your doctor if a child who is getting flu vaccine has ever had a seizure Problems that could happen after any injected vaccine: · People sometimes faint after a medical procedure, including vaccination. Sitting or lying down for about 15 minutes can help prevent fainting, and injuries caused by a fall. Tell your doctor if you feel dizzy, or have vision changes or ringing in the ears. · Some people get severe pain in the shoulder and have difficulty moving the arm where a shot was given. This happens very rarely. · Any medication can cause a severe allergic reaction. Such reactions from a vaccine are very rare, estimated at about 1 in a million doses, and would happen within a few minutes to a few hours after the vaccination. As with any medicine, there is a very remote chance of a vaccine causing a serious injury or death. The safety of vaccines is always being monitored. For more information, visit: www.cdc.gov/vaccinesafety/. What if there is a serious reaction? What should I look for? · Look for anything that concerns you, such as signs of a severe allergic reaction, very high fever, or unusual behavior. Signs of a severe allergic reaction can include hives, swelling of the face and throat, difficulty breathing, a fast heartbeat, dizziness, and weakness - usually within a few minutes to a few hours after the vaccination. What should I do?  
· If you think it is a severe allergic reaction or other emergency that can't wait, call 9-1-1 and get the person to the nearest hospital. Otherwise, call your doctor. · Reactions should be reported to the \"Vaccine Adverse Event Reporting System\" (VAERS). Your doctor should file this report, or you can do it yourself through the VAERS website at www.vaers. [facility].gov, or by calling 8-228.418.6934. VAERS does not give medical advice. The National Vaccine Injury Compensation Program 
The National Vaccine Injury Compensation Program (VICP) is a federal program that was created to compensate people who may have been injured by certain vaccines. Persons who believe they may have been injured by a vaccine can learn about the program and about filing a claim by calling 4-123.685.4628 or visiting the [employer] website at www.[employer]gov/vaccinecompensation. There is a time limit to file a claim for compensation. How can I learn more? · Ask your healthcare provider. He or she can give you the vaccine package insert or suggest other sources of information. · Call your local or state health department. · Contact the Centers for Disease Control and Prevention (CDC): 
¨ Call 6-495.583.8748 (1-800-CDC-INFO) or ¨ Visit CDC's website at www.cdc.gov/flu Vaccine Information Statement Inactivated Influenza Vaccine 8/7/2015) [address] 836HJ-86 Department of Health and [employer] Centers for Disease Control and Prevention Many Vaccine Information Statements are available in Upper sorbian and other languages. See www.immunize.org/vis. Muchas hojas de información sobre vacunas están disponibles en español y en otros idiomas. Visite www.immunize.org/vis. Care instructions adapted under license by [employer] (which disclaims liability or warranty for this information). If you have questions about a medical condition or this instruction, always ask your healthcare professional. [address] any warranty or liability for your use of this information. Please provide this summary of care documentation to your next provider. Your primary care clinician is listed as [place]. If you have any questions after today's visit, please call [phone].

## 2018-09-22 LAB
BASOPHILS # BLD AUTO: 0 X10E3/UL (ref 0–0.2)
BASOPHILS NFR BLD AUTO: 0 %
EOSINOPHIL # BLD AUTO: 0.2 X10E3/UL (ref 0–0.4)
EOSINOPHIL NFR BLD AUTO: 3 %
ERYTHROCYTE [DISTWIDTH] IN BLOOD BY AUTOMATED COUNT: 14.7 % (ref 12.3–15.4)
FERRITIN SERPL-MCNC: 445 NG/ML (ref 15–150)
FOLATE SERPL-MCNC: 10.1 NG/ML
HCT VFR BLD AUTO: 29.7 % (ref 34–46.6)
HGB BLD-MCNC: 9.6 G/DL (ref 11.1–15.9)
IMM GRANULOCYTES # BLD: 0 X10E3/UL (ref 0–0.1)
IMM GRANULOCYTES NFR BLD: 0 %
IRON SATN MFR SERPL: 19 % (ref 15–55)
IRON SERPL-MCNC: 36 UG/DL (ref 27–139)
LYMPHOCYTES # BLD AUTO: 1.2 X10E3/UL (ref 0.7–3.1)
LYMPHOCYTES NFR BLD AUTO: 18 %
MCH RBC QN AUTO: 30 PG (ref 26.6–33)
MCHC RBC AUTO-ENTMCNC: 32.3 G/DL (ref 31.5–35.7)
MCV RBC AUTO: 93 FL (ref 79–97)
MONOCYTES # BLD AUTO: 0.3 X10E3/UL (ref 0.1–0.9)
MONOCYTES NFR BLD AUTO: 5 %
NEUTROPHILS # BLD AUTO: 5 X10E3/UL (ref 1.4–7)
NEUTROPHILS NFR BLD AUTO: 74 %
PLATELET # BLD AUTO: 310 X10E3/UL (ref 150–379)
RBC # BLD AUTO: 3.2 X10E6/UL (ref 3.77–5.28)
RETICS/RBC NFR AUTO: 1.3 % (ref 0.6–2.6)
TIBC SERPL-MCNC: 190 UG/DL (ref 250–450)
UIBC SERPL-MCNC: 154 UG/DL (ref 118–369)
VIT B12 SERPL-MCNC: 279 PG/ML (ref 232–1245)
WBC # BLD AUTO: 6.8 X10E3/UL (ref 3.4–10.8)

## 2018-09-26 ENCOUNTER — TELEPHONE (OUTPATIENT)
Dept: FAMILY MEDICINE CLINIC | Age: 73
End: 2018-09-26

## 2018-09-26 NOTE — TELEPHONE ENCOUNTER
I have reviewed the patient's labs. She is still currently anemia but it does not appear to be due to blood loss or iron deficiency. More likely it is due to chronic conditions. However the good news is that it is stable at this time.      Raman Herrera MD

## 2018-10-04 RX ORDER — FUROSEMIDE 40 MG/1
40 TABLET ORAL DAILY
Qty: 30 TAB | Refills: 2 | Status: SHIPPED | OUTPATIENT
Start: 2018-10-04 | End: 2018-12-14 | Stop reason: SDUPTHER

## 2018-10-08 DIAGNOSIS — E55.9 VITAMIN D DEFICIENCY: ICD-10-CM

## 2018-10-08 RX ORDER — NICOTINE 11MG/24HR
PATCH, TRANSDERMAL 24 HOURS TRANSDERMAL
Qty: 30 CAP | Refills: 2 | Status: SHIPPED | OUTPATIENT
Start: 2018-10-08 | End: 2019-01-10

## 2018-12-14 RX ORDER — FUROSEMIDE 40 MG/1
TABLET ORAL
Qty: 30 TAB | Refills: 0 | Status: SHIPPED | OUTPATIENT
Start: 2018-12-14 | End: 2019-01-10

## 2018-12-17 ENCOUNTER — OFFICE VISIT (OUTPATIENT)
Dept: FAMILY MEDICINE CLINIC | Age: 73
End: 2018-12-17

## 2018-12-17 VITALS
DIASTOLIC BLOOD PRESSURE: 57 MMHG | HEART RATE: 85 BPM | RESPIRATION RATE: 20 BRPM | WEIGHT: 183 LBS | OXYGEN SATURATION: 98 % | BODY MASS INDEX: 33.68 KG/M2 | TEMPERATURE: 97.7 F | HEIGHT: 62 IN | SYSTOLIC BLOOD PRESSURE: 107 MMHG

## 2018-12-17 DIAGNOSIS — Z12.39 SCREENING FOR MALIGNANT NEOPLASM OF BREAST: ICD-10-CM

## 2018-12-17 DIAGNOSIS — Z12.11 SCREENING FOR MALIGNANT NEOPLASM OF COLON: ICD-10-CM

## 2018-12-17 DIAGNOSIS — L29.9 ITCHING: Primary | ICD-10-CM

## 2018-12-17 DIAGNOSIS — G56.02 CARPAL TUNNEL SYNDROME OF LEFT WRIST: ICD-10-CM

## 2018-12-17 DIAGNOSIS — E11.9 DIABETES MELLITUS TYPE 2, DIET-CONTROLLED (HCC): ICD-10-CM

## 2018-12-17 DIAGNOSIS — Z23 ENCOUNTER FOR IMMUNIZATION: ICD-10-CM

## 2018-12-17 RX ORDER — OLANZAPINE 10 MG/1
TABLET ORAL
COMMUNITY
Start: 2018-10-18

## 2018-12-17 RX ORDER — OLANZAPINE 10 MG/1
10 TABLET ORAL
COMMUNITY
End: 2018-12-17 | Stop reason: SDUPTHER

## 2018-12-17 RX ORDER — MIRTAZAPINE 15 MG/1
TABLET, FILM COATED ORAL
COMMUNITY
Start: 2018-10-18 | End: 2022-06-27

## 2018-12-17 NOTE — PROGRESS NOTES
1. Have you been to the ER, urgent care clinic since your last visit? Hospitalized since your last visit? No    2. Have you seen or consulted any other health care providers outside of the [address] [place] [address] since your last visit? Include any pap smears or colon screening.  No  Reviewed record in preparation for visit and have necessary documentation  Pt did not bring medication to office visit for review    Goals that were addressed and/or need to be completed during or after this appointment include   Health Maintenance Due   Topic Date Due    DTaP/Tdap/Td series (1 - Tdap) 07/07/1966    Shingrix Vaccine Age 50> (1 of 2) 07/07/1995    GLAUCOMA SCREENING Q2Y  07/07/2010    EYE EXAM RETINAL OR DILATED  11/12/2015    FOBT Q 1 YEAR AGE 50-75  02/24/2016    BREAST CANCER SCRN MAMMOGRAM  04/14/2016    HEMOGLOBIN A1C Q6M  11/24/2018

## 2018-12-17 NOTE — PATIENT INSTRUCTIONS
An appointment has been made for you to see Dr. Frandy Cosme, ophthalmologist, on June 21, 2018 at 9:30. The office is located at [facility] ([facility]), [place]  and the number to the office is [phone]. Letter to patient and records sent electronically. Your physician has referred you for a mammogram. If you would like this done at a [address] [place] [address] facility, a member of the [facility], operated by [facility] Patient Care Team will contact you within 24 hours to schedule. If you do not hear from a team member, please call [phone] to speak with this team directly to schedule your mammogram.    If you prefer this imaging at an alternate location, please call to schedule: [facility]- [address]- [address] [place] [address]- [phone]  [address] [place] [address] ([facility] or [facility]ony [facility])- [phone]  [facility] [phone]  [address] [phone]  [facility] [phone]       Dry Skin: Care Instructions  Your Care Instructions  Dry skin is a common problem, especially in areas where the air is very dry. Dry skin can also become a problem as you get older and lose natural oils that keep your skin moist.  A tendency toward dry, itchy skin may run in families. Some problems with the body's defenses (immune system), allergies, or an infection with a fungus may also cause patches of dry skin. An over-the-counter cream may help your dry skin. If your skin problem does not get better with home treatment, your doctor may prescribe ointment. You may need antibiotics if you have a skin infection. Follow-up care is a key part of your treatment and safety. Be sure to make and go to all appointments, and call your doctor if you are having problems. It's also a good idea to know your test results and keep a list of the medicines you take. How can you care for yourself at home?   Showers and baths  · Keep showers and baths short, and use warm or lukewarm water. Don't use hot water. It takes off more of your skin's natural oils. · Use as little soap as you can. Choose a mild soap, such as Dove, Cetaphil, or Neutrogena. Or use a skin cleanser like Aquanil or Cetaphil. · If you are taking a bath, use soap only at the very end. Then rinse off all traces of soap with fresh water. Gently pat your skin dry with a towel. Skin creams and moisturizers  · Apply moisturizer or skin cream right away (within 3 minutes) after a bath or shower. Use a moisturizer at other times too, as often as you need it. · Moisturizing creams are better than lotions. Try brands like CeraVe cream, Cetaphil cream, or Eucerin cream.  Other tips  · When washing clothes, use a small amount of detergent. Don't use fabric softeners or dryer sheets. · For small areas of itchy skin, try an over-the-counter 1% hydrocortisone cream.  · If you have very dry hands, spread petroleum jelly (such as Vaseline) on your hands before bed. Wear thin cotton gloves while you sleep. If your feet are dry, spread Vaseline on them and wear socks while you sleep. When should you call for help? Call your doctor now or seek immediate medical care if:    · You have signs of infection, such as:  ? Pain, warmth, or swelling in the skin. ? Red streaks near a wound in the skin. ? Pus coming from a wound in your skin. ? A fever.    Watch closely for changes in your health, and be sure to contact your doctor if:    · You do not get better as expected. Where can you learn more? Go to http://florin-kevin.info/. Enter N501 in the search box to learn more about \"Dry Skin: Care Instructions. \"  Current as of: April 18, 2018  Content Version: 11.8  © 4890-9298 [employer]. Care instructions adapted under license by [employer] (which disclaims liability or warranty for this information).  If you have questions about a medical condition or this instruction, always ask your healthcare professional. [place] [address] any warranty or liability for your use of this information. Carpal Tunnel Syndrome: Exercises  Your Care Instructions  Here are some examples of typical rehabilitation exercises for your condition. Start each exercise slowly. Ease off the exercise if you start to have pain. Your doctor or your physical or occupational therapist will tell you when you can start these exercises and which ones will work best for you. Warm-up stretches  When you no longer have pain or numbness, you can do exercises to help prevent carpal tunnel syndrome from coming back. Do not do any stretch or movement that is uncomfortable or painful. 1. Rotate your wrist up, down, and from side to side. Repeat 4 times. 2. Stretch your fingers far apart. Relax them, and then stretch them again. Repeat 4 times. 3. Stretch your thumb by pulling it back gently, holding it, and then releasing it. Repeat 4 times. How to do the exercises  Prayer stretch    1. Start with your palms together in front of your chest just below your chin. 2. Slowly lower your hands toward your waistline, keeping your hands close to your stomach and your palms together until you feel a mild to moderate stretch under your forearms. 3. Hold for at least 15 to 30 seconds. Repeat 2 to 4 times. Wrist flexor stretch    1. Extend your arm in front of you with your palm up. 2. Bend your wrist, pointing your hand toward the floor. 3. With your other hand, gently bend your wrist farther until you feel a mild to moderate stretch in your forearm. 4. Hold for at least 15 to 30 seconds. Repeat 2 to 4 times. Wrist extensor stretch    1. Repeat steps 1 through 4 of the stretch above, but begin with your extended hand palm down. Follow-up care is a key part of your treatment and safety. Be sure to make and go to all appointments, and call your doctor if you are having problems. It's also a good idea to know your test results and keep a list of the medicines you take. Where can you learn more? Go to http://florin-kevin.info/. Enter J932 in the search box to learn more about \"Carpal Tunnel Syndrome: Exercises. \"  Current as of: November 29, 2017  Content Version: 11.8  © 7238-5924 [employer]. Care instructions adapted under license by [employer] (which disclaims liability or warranty for this information). If you have questions about a medical condition or this instruction, always ask your healthcare professional. [place] [address] any warranty or liability for your use of this information.

## 2018-12-17 NOTE — PROGRESS NOTES
Althea Carrillo  68 y.o. female  1945  [address] [facility]  [id number]     [facility] Practice: Progress Note       Encounter Date: 12/17/2018    Chief Complaint   Patient presents with    Skin Problem       History provided by patient  History of Present Illness   Althea Carrillo is a 68 y.o. female who presents to clinic today for:    itching  Patient present with cc of itching on arms and torso for several months. Patient endorses that since she had the flu shot at th end of September she has been itching. Denies any rash or lesions of the skin. Reports that she has not changed any soaps, or detergents. Numbness of finger in left hand  Patient endorses numbness in3 fingers of the left hand. She reports that it waxes and wanes and gets better \"if I shake it. \" Has not taken any medication for the issue. Denies pain. Health Maintenance  Health Maintenance Due   Topic Date Due    DTaP/Tdap/Td series (1 - Tdap) 07/07/1966    Shingrix Vaccine Age 50> (1 of 2) 07/07/1995    GLAUCOMA SCREENING Q2Y  07/07/2010    FOBT Q 1 YEAR AGE 50-75  02/24/2016    BREAST CANCER SCRN MAMMOGRAM  04/14/2016    HEMOGLOBIN A1C Q6M  11/24/2018     Review of Systems   Review of Systems   Constitutional: Negative for chills and fever. Cardiovascular: Negative for chest pain, palpitations and orthopnea. Gastrointestinal: Negative for abdominal pain, constipation, diarrhea, nausea and vomiting. Skin: Positive for itching. Negative for rash. Neurological: Positive for tingling. Negative for speech change, focal weakness and headaches. Vitals/Objective:     Vitals:    12/17/18 0806   BP: 107/57   Pulse: 85   Resp: 20   Temp: 97.7 °F (36.5 °C)   TempSrc: Oral   SpO2: 98%   Weight: 183 lb (83 kg)   Height: 5' 2\" (1.575 m)     Body mass index is 33.47 kg/m².     Wt Readings from Last 3 Encounters:   12/17/18 183 lb (83 kg)   09/21/18 193 lb (87.5 kg)   07/12/18 193 lb (87.5 kg)       Physical Exam   Constitutional: She is oriented to person, place, and time. She appears well-developed and well-nourished. HENT:   Head: Normocephalic and atraumatic. Cardiovascular: Normal rate and regular rhythm. Pulmonary/Chest: Effort normal and breath sounds normal.   Neurological: She is alert and oriented to person, place, and time. Skin: Capillary refill takes less than 2 seconds. No rash noted. No erythema. Wrist: left  Wrist Effusion: None  Deformity: None    ROM:  Flexion:  Extension:  Ulna deviation:  Radial deviation:    Palpation:  Snuff box tenderness: None  TFCC tenderness: None  Ulna styloid tenderness: None  Distal radius tenderness: None  Hook of Hamate tenderness: None  Second compartment (intersection) tenderness: None    Other test:  Finkelsteins test: Negative  Phalens test: Positive  Tinels test: Negative  Ulnar sided compression test: Negative  Santanas test: Negative    Strength (0-5/5):   Flexion:5/5  Extension: 5/5  : 5/5  Intrinsics: 5/5  EPL (extensor pollicis longus): 5/5  Pinch mechanism: 5/5        No results found for this or any previous visit (from the past 24 hour(s)). Assessment and Plan:     Encounter Diagnoses     ICD-10-CM ICD-9-CM   1. Itching L29.9 698.9   2. Carpal tunnel syndrome of left wrist G56.02 354.0   3. Screening for malignant neoplasm of breast Z12.31 V76.10   4. Encounter for immunization Z23 V03.89   5. Screening for malignant neoplasm of colon Z12.11 V76.51   6. Diabetes mellitus type 2, diet-controlled (HCC) E11.9 250.00       1. Itching  Advised Mositurizing and will check blood work. - METABOLIC PANEL, COMPREHENSIVE  - CBC WITH AUTOMATED DIFF    2. Carpal tunnel syndrome of left wrist  Discussed trying wrist brace for left wrist.     3. Screening for malignant neoplasm of breast  - DYLAN MAMMO BI SCREENING INCL CAD; Future    4.  Encounter for immunization  - varicella-zoster recombinant, PF, (SHINGRIX, PF,) 50 mcg/0.5 mL susr injection; 0.5 mL by IntraMUSCular route once for 1 dose. Dispense: 0.5 mL; Refill: 0  - diphtheria-pertussis, acellular,-tetanus (BOOSTRIX TDAP) 2.5-8-5 Lf-mcg-Lf/0.5mL susp susp; 0.5 mL by IntraMUSCular route once for 1 dose. Please fax confirmation to the attn of prescribing provider at [phone]. Dispense: 0.5 mL; Refill: 0    5. Screening for malignant neoplasm of colon  - OCCULT BLOOD IMMUNOASSAY,DIAGNOSTIC    6. Diabetes mellitus type 2, diet-controlled ([facility] [address]  - HEMOGLOBIN A1C WITH EAG      I have discussed the diagnosis with the patient and the intended plan as seen in the above orders. she has expressed understanding. The patient has received an after-visit summary and questions were answered concerning future plans. I have discussed medication side effects and warnings with the patient as well. Electronically Signed: Joselyn Christine MD     History/Allergies   Patients past medical, surgical and family histories were reviewed and updated. Past Medical History:   Diagnosis Date    Hypertension     Psychotic disorder [facility])       Past Surgical History:   Procedure Laterality Date    HX GYN       No family history on file.   Social History     Socioeconomic History    Marital status:      Spouse name: Not on file    Number of children: Not on file    Years of education: Not on file    Highest education level: Not on file   Social Needs    Financial resource strain: Not on file    Food insecurity - worry: Not on file    Food insecurity - inability: Not on file    Transportation needs - medical: Not on file   [employer] needs - non-medical: Not on file   Occupational History    Not on file   Tobacco Use    Smoking status: Former Smoker    Smokeless tobacco: Never Used   Substance and Sexual Activity    Alcohol use: No    Drug use: No    Sexual activity: Not on file   Other Topics Concern    Not on file   Social History Narrative    Not on file         Allergies   Allergen Reactions    Bactrim [Sulfamethoxazole-Trimethoprim] Nausea Only       Disposition     Follow-up Disposition:  Return in about 2 weeks (around 12/31/2018) for itching. .    No future appointments. Current Medications after this visit     Current Outpatient Medications   Medication Sig    mirtazapine (REMERON) 15 mg tablet     OLANZapine (ZYPREXA) 10 mg tablet     varicella-zoster recombinant, PF, (SHINGRIX, PF,) 50 mcg/0.5 mL susr injection 0.5 mL by IntraMUSCular route once for 1 dose.  diphtheria-pertussis, acellular,-tetanus (BOOSTRIX TDAP) 2.5-8-5 Lf-mcg-Lf/0.5mL susp susp 0.5 mL by IntraMUSCular route once for 1 dose. Please fax confirmation to the attn of prescribing provider at [phone].  furosemide (LASIX) 40 mg tablet TAKE ONE TABLET BY MOUTH DAILY    lisinopril (PRINIVIL, ZESTRIL) 40 mg tablet TAKE ONE TABLET BY MOUTH EVERY DAY    lurasidone (LATUDA) 80 mg tab tablet Take 1 Tab by mouth two (2) times a day.  VITAMIN D3 2,000 unit cap capsule TAKE 1 CAPSULE BY MOUTH DAILY     No current facility-administered medications for this visit.       Medications Discontinued During This Encounter   Medication Reason    OLANZapine (ZYPREXA) 10 mg tablet Duplicate Order

## 2018-12-18 LAB
ALBUMIN SERPL-MCNC: 4.5 G/DL (ref 3.5–4.8)
ALBUMIN/GLOB SERPL: 1.5 {RATIO} (ref 1.2–2.2)
ALP SERPL-CCNC: 56 IU/L (ref 39–117)
ALT SERPL-CCNC: 10 IU/L (ref 0–32)
AST SERPL-CCNC: 16 IU/L (ref 0–40)
BASOPHILS # BLD AUTO: 0 X10E3/UL (ref 0–0.2)
BASOPHILS NFR BLD AUTO: 0 %
BILIRUB SERPL-MCNC: 0.4 MG/DL (ref 0–1.2)
BUN SERPL-MCNC: 13 MG/DL (ref 8–27)
BUN/CREAT SERPL: 12 (ref 12–28)
CALCIUM SERPL-MCNC: 9.4 MG/DL (ref 8.7–10.3)
CHLORIDE SERPL-SCNC: 100 MMOL/L (ref 96–106)
CO2 SERPL-SCNC: 22 MMOL/L (ref 20–29)
CREAT SERPL-MCNC: 1.07 MG/DL (ref 0.57–1)
EOSINOPHIL # BLD AUTO: 0.1 X10E3/UL (ref 0–0.4)
EOSINOPHIL NFR BLD AUTO: 2 %
ERYTHROCYTE [DISTWIDTH] IN BLOOD BY AUTOMATED COUNT: 16.2 % (ref 12.3–15.4)
EST. AVERAGE GLUCOSE BLD GHB EST-MCNC: 111 MG/DL
GLOBULIN SER CALC-MCNC: 3 G/DL (ref 1.5–4.5)
GLUCOSE SERPL-MCNC: 114 MG/DL (ref 65–99)
HBA1C MFR BLD: 5.5 % (ref 4.8–5.6)
HCT VFR BLD AUTO: 30.7 % (ref 34–46.6)
HGB BLD-MCNC: 9.9 G/DL (ref 11.1–15.9)
IMM GRANULOCYTES # BLD: 0 X10E3/UL (ref 0–0.1)
IMM GRANULOCYTES NFR BLD: 0 %
LYMPHOCYTES # BLD AUTO: 1.7 X10E3/UL (ref 0.7–3.1)
LYMPHOCYTES NFR BLD AUTO: 34 %
MCH RBC QN AUTO: 29.7 PG (ref 26.6–33)
MCHC RBC AUTO-ENTMCNC: 32.2 G/DL (ref 31.5–35.7)
MCV RBC AUTO: 92 FL (ref 79–97)
MONOCYTES # BLD AUTO: 0.3 X10E3/UL (ref 0.1–0.9)
MONOCYTES NFR BLD AUTO: 6 %
NEUTROPHILS # BLD AUTO: 2.8 X10E3/UL (ref 1.4–7)
NEUTROPHILS NFR BLD AUTO: 58 %
PLATELET # BLD AUTO: 285 X10E3/UL (ref 150–379)
POTASSIUM SERPL-SCNC: 3.8 MMOL/L (ref 3.5–5.2)
PROT SERPL-MCNC: 7.5 G/DL (ref 6–8.5)
RBC # BLD AUTO: 3.33 X10E6/UL (ref 3.77–5.28)
SODIUM SERPL-SCNC: 137 MMOL/L (ref 134–144)
WBC # BLD AUTO: 4.9 X10E3/UL (ref 3.4–10.8)

## 2019-01-09 RX ORDER — LISINOPRIL 40 MG/1
TABLET ORAL
Qty: 90 TAB | Refills: 0 | Status: SHIPPED | OUTPATIENT
Start: 2019-01-09 | End: 2019-04-22 | Stop reason: SDUPTHER

## 2019-01-10 ENCOUNTER — OFFICE VISIT (OUTPATIENT)
Dept: FAMILY MEDICINE CLINIC | Age: 74
End: 2019-01-10

## 2019-01-10 VITALS
SYSTOLIC BLOOD PRESSURE: 149 MMHG | DIASTOLIC BLOOD PRESSURE: 80 MMHG | RESPIRATION RATE: 18 BRPM | TEMPERATURE: 98.2 F | OXYGEN SATURATION: 97 % | BODY MASS INDEX: 33.49 KG/M2 | HEIGHT: 62 IN | HEART RATE: 82 BPM | WEIGHT: 182 LBS

## 2019-01-10 DIAGNOSIS — G89.29 BILATERAL CHRONIC KNEE PAIN: ICD-10-CM

## 2019-01-10 DIAGNOSIS — M25.562 BILATERAL CHRONIC KNEE PAIN: ICD-10-CM

## 2019-01-10 DIAGNOSIS — M25.561 BILATERAL CHRONIC KNEE PAIN: ICD-10-CM

## 2019-01-10 DIAGNOSIS — M25.561 CHRONIC PAIN OF BOTH KNEES: ICD-10-CM

## 2019-01-10 DIAGNOSIS — M25.562 CHRONIC PAIN OF BOTH KNEES: ICD-10-CM

## 2019-01-10 DIAGNOSIS — G89.29 CHRONIC PAIN OF BOTH KNEES: ICD-10-CM

## 2019-01-10 RX ORDER — DICLOFENAC SODIUM 75 MG/1
TABLET, DELAYED RELEASE ORAL
Qty: 60 TAB | Refills: 2 | Status: SHIPPED | OUTPATIENT
Start: 2019-01-10 | End: 2019-04-22 | Stop reason: SDUPTHER

## 2019-01-10 NOTE — PATIENT INSTRUCTIONS
Knee Pain or Injury: Care Instructions Your Care Instructions Injuries are a common cause of knee problems. Sudden (acute) injuries may be caused by a direct blow to the knee. They can also be caused by abnormal twisting, bending, or falling on the knee. Pain, bruising, or swelling may be severe, and may start within minutes of the injury. Overuse is another cause of knee pain. Other causes are climbing stairs, kneeling, and other activities that use the knee. Everyday wear and tear, especially as you get older, also can cause knee pain. Rest, along with home treatment, often relieves pain and allows your knee to heal. If you have a serious knee injury, you may need tests and treatment. Follow-up care is a key part of your treatment and safety. Be sure to make and go to all appointments, and call your doctor if you are having problems. It's also a good idea to know your test results and keep a list of the medicines you take. How can you care for yourself at home? · Be safe with medicines. Read and follow all instructions on the label. ? If the doctor gave you a prescription medicine for pain, take it as prescribed. ? If you are not taking a prescription pain medicine, ask your doctor if you can take an over-the-counter medicine. · Rest and protect your knee. Take a break from any activity that may cause pain. · Put ice or a cold pack on your knee for 10 to 20 minutes at a time. Put a thin cloth between the ice and your skin. · Prop up a sore knee on a pillow when you ice it or anytime you sit or lie down for the next 3 days. Try to keep it above the level of your heart. This will help reduce swelling. · If your knee is not swollen, you can put moist heat, a heating pad, or a warm cloth on your knee. · If your doctor recommends an elastic bandage, sleeve, or other type of support for your knee, wear it as directed.  
· Follow your doctor's instructions about how much weight you can put on your leg. Use a cane, crutches, or a walker as instructed. · Follow your doctor's instructions about activity during your healing process. If you can do mild exercise, slowly increase your activity. · Reach and stay at a healthy weight. Extra weight can strain the joints, especially the knees and hips, and make the pain worse. Losing even a few pounds may help. When should you call for help? Call 911 anytime you think you may need emergency care. For example, call if: 
  · You have symptoms of a blood clot in your lung (called a pulmonary embolism). These may include: 
? Sudden chest pain. ? Trouble breathing. ? Coughing up blood.  
 Call your doctor now or seek immediate medical care if: 
  · You have severe or increasing pain.  
  · Your leg or foot turns cold or changes color.  
  · You cannot stand or put weight on your knee.  
  · Your knee looks twisted or bent out of shape.  
  · You cannot move your knee.  
  · You have signs of infection, such as: 
? Increased pain, swelling, warmth, or redness. ? Red streaks leading from the knee. ? Pus draining from a place on your knee. ? A fever.  
  · You have signs of a blood clot in your leg (called a deep vein thrombosis), such as: 
? Pain in your calf, back of the knee, thigh, or groin. ? Redness and swelling in your leg or groin.  
 Watch closely for changes in your health, and be sure to contact your doctor if: 
  · You have tingling, weakness, or numbness in your knee.  
  · You have any new symptoms, such as swelling.  
  · You have bruises from a knee injury that last longer than 2 weeks.  
  · You do not get better as expected. Where can you learn more? Go to http://florin-kevin.info/. Enter K195 in the search box to learn more about \"Knee Pain or Injury: Care Instructions. \" Current as of: November 20, 2017 Content Version: 11.8 © 9449-8636 [employer].  Care instructions adapted under license by [address] (which disclaims liability or warranty for this information). If you have questions about a medical condition or this instruction, always ask your healthcare professional. [address] any warranty or liability for your use of this information.

## 2019-01-10 NOTE — PROGRESS NOTES
Rozina Hampton 68 y.o. female 1945 
[address] 
[id number] [facility] Practice: Progress Note Encounter Date: 1/10/2019 Chief Complaint Patient presents with  Leg Pain  
  x months constant pain from knees to feet History provided by patient History of Present Illness Rozina Hampton is a 68 y.o. female who presents to clinic today for: 
 
Bilateral leg pain Patient present with cc of bilateral leg pain, chronic. Now reports that pain is worse in the right lower leg then in the left lower leg. Describes pain as sharp pain. 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 Advised that a CT of left knee would be needed prior to surgery consult to discuss options. Patient yet to complete CT of left knee which had been scheduled for November 2018 or cardiology consult for pre-op (she no-showed.) Health Maintenance Review of Systems Review of Systems Musculoskeletal: Positive for joint pain. Negative for back pain, falls, myalgias and neck pain. Neurological: Negative for dizziness, tingling, focal weakness, loss of consciousness and headaches. Vitals/Objective:  
 
Vitals:  
 01/10/19 8867 BP: 149/80 Pulse: 82 Resp: 18 Temp: 98.2 °F (36.8 °C) TempSrc: Oral  
SpO2: 97% Weight: 182 lb (82.6 kg) Height: 5' 2\" (1.575 m) Body mass index is 33.29 kg/m². Wt Readings from Last 3 Encounters:  
01/10/19 182 lb (82.6 kg) 12/17/18 183 lb (83 kg) 09/21/18 193 lb (87.5 kg) Physical Exam  
Constitutional: She appears well-developed and well-nourished. Cardiovascular: Normal rate and regular rhythm. Pulmonary/Chest: Effort normal and breath sounds normal.  
Musculoskeletal:  
     Left knee: She exhibits decreased range of motion, effusion and abnormal alignment (valgus). She exhibits no ecchymosis. Tenderness found. Medial joint line and lateral joint line tenderness noted. No results found for this or any previous visit (from the past 24 hour(s)). Assessment and Plan:  
 
Encounter Diagnoses ICD-10-CM ICD-9-CM 1. Bilateral chronic knee pain M25.561 719.46  
 M25.562 338.29 G89.29   
2. Chronic pain of both knees Right worse then left M25.561 719.46  
 M25.562 338.29 G89.29   
 
 
1. Bilateral chronic knee pain 2. Chronic pain of both knees Right worse then left Patient states that she cannot go back to Dr. Ck Law due to medical cost and transportation issues. Patient was given paperwork for financial assistance for [facility] in [place].  
- diclofenac EC (VOLTAREN) 75 mg EC tablet; TAKE ONE TABLET BY MOUTH TWICE DAILY AS NEEDED  Dispense: 60 Tab; Refill: 2 I have discussed the diagnosis with the patient and the intended plan as seen in the above orders. she has expressed understanding. The patient has received an after-visit summary and questions were answered concerning future plans. I have discussed medication side effects and warnings with the patient as well. Electronically Signed: Jimmy Felton MD 
  
History/Allergies Patients past medical, surgical and family histories were reviewed and updated. Past Medical History:  
Diagnosis Date  Hypertension  Psychotic disorder ([facility] [address] Past Surgical History:  
Procedure Laterality Date  HX GYN History reviewed. No pertinent family history. Social History Socioeconomic History  Marital status:  Spouse name: Not on file  Number of children: Not on file  Years of education: Not on file  Highest education level: Not on file Social Needs  Financial resource strain: Not on file  Food insecurity - worry: Not on file  Food insecurity - inability: Not on file  Transportation needs - medical: Not on file  Transportation needs - non-medical: Not on file Occupational History  Not on file Tobacco Use  Smoking status: Former Smoker  Smokeless tobacco: Never Used Substance and Sexual Activity  Alcohol use: No  
 Drug use: No  
 Sexual activity: Not on file Other Topics Concern  Not on file Social History Narrative  Not on file Allergies Allergen Reactions  Bactrim [Sulfamethoxazole-Trimethoprim] Nausea Only Disposition Follow-up Disposition: 
Return in about 4 weeks (around 2/7/2019) for Routine with blood work. No future appointments. Current Medications after this visit Current Outpatient Medications Medication Sig  
 diclofenac EC (VOLTAREN) 75 mg EC tablet TAKE ONE TABLET BY MOUTH TWICE DAILY AS NEEDED  
 lisinopril (PRINIVIL, ZESTRIL) 40 mg tablet TAKE ONE TABLET BY MOUTH EVERY DAY  mirtazapine (REMERON) 15 mg tablet  OLANZapine (ZYPREXA) 10 mg tablet  lurasidone (LATUDA) 80 mg tab tablet Take 1 Tab by mouth two (2) times a day. No current facility-administered medications for this visit. Medications Discontinued During This Encounter Medication Reason  furosemide (LASIX) 40 mg tablet Not A Current Medication  VITAMIN D3 2,000 unit cap capsule Not A Current Medication

## 2019-04-17 ENCOUNTER — TELEPHONE (OUTPATIENT)
Dept: FAMILY MEDICINE CLINIC | Age: 74
End: 2019-04-17

## 2019-04-17 NOTE — TELEPHONE ENCOUNTER
Patient called to set up routine visit appt with labs. Appt scheduled for 04/22/2019 at 8am with NP. Reminder letter also mailed.

## 2019-04-22 ENCOUNTER — OFFICE VISIT (OUTPATIENT)
Dept: FAMILY MEDICINE CLINIC | Age: 74
End: 2019-04-22

## 2019-04-22 VITALS
HEART RATE: 80 BPM | BODY MASS INDEX: 35.26 KG/M2 | HEIGHT: 62 IN | SYSTOLIC BLOOD PRESSURE: 110 MMHG | RESPIRATION RATE: 20 BRPM | TEMPERATURE: 97.2 F | DIASTOLIC BLOOD PRESSURE: 71 MMHG | OXYGEN SATURATION: 98 % | WEIGHT: 191.6 LBS

## 2019-04-22 DIAGNOSIS — M25.562 CHRONIC PAIN OF BOTH KNEES: ICD-10-CM

## 2019-04-22 DIAGNOSIS — R79.89 ABNORMAL CBC: ICD-10-CM

## 2019-04-22 DIAGNOSIS — G89.29 CHRONIC PAIN OF BOTH KNEES: ICD-10-CM

## 2019-04-22 DIAGNOSIS — M25.561 CHRONIC PAIN OF BOTH KNEES: ICD-10-CM

## 2019-04-22 DIAGNOSIS — M25.561 BILATERAL CHRONIC KNEE PAIN: ICD-10-CM

## 2019-04-22 DIAGNOSIS — G89.29 BILATERAL CHRONIC KNEE PAIN: ICD-10-CM

## 2019-04-22 DIAGNOSIS — M25.562 BILATERAL CHRONIC KNEE PAIN: ICD-10-CM

## 2019-04-22 DIAGNOSIS — E11.21 TYPE 2 DIABETES WITH NEPHROPATHY (HCC): ICD-10-CM

## 2019-04-22 DIAGNOSIS — I10 ESSENTIAL HYPERTENSION: Primary | ICD-10-CM

## 2019-04-22 DIAGNOSIS — E66.01 SEVERE OBESITY (BMI 35.0-39.9) WITH COMORBIDITY (HCC): ICD-10-CM

## 2019-04-22 DIAGNOSIS — F20.3 UNDIFFERENTIATED SCHIZOPHRENIA (HCC): ICD-10-CM

## 2019-04-22 RX ORDER — DICLOFENAC SODIUM 75 MG/1
TABLET, DELAYED RELEASE ORAL
Qty: 60 TAB | Refills: 2 | Status: SHIPPED | OUTPATIENT
Start: 2019-04-22 | End: 2019-12-23 | Stop reason: SDUPTHER

## 2019-04-22 RX ORDER — LISINOPRIL 40 MG/1
TABLET ORAL
Qty: 90 TAB | Refills: 0 | Status: SHIPPED | OUTPATIENT
Start: 2019-04-22 | End: 2019-08-03 | Stop reason: SDUPTHER

## 2019-04-22 NOTE — PATIENT INSTRUCTIONS
Eating Healthy Foods: Care Instructions  Your Care Instructions    Eating healthy foods can help lower your risk for disease. Healthy food gives you energy and keeps your heart strong, your brain active, your muscles working, and your bones strong. A healthy diet includes a variety of foods from the basic food groups: grains, vegetables, fruits, milk and milk products, and meat and beans. Some people may eat more of their favorite foods from only one food group and, as a result, miss getting the nutrients they need. So, it is important to pay attention not only to what you eat but also to what you are missing from your diet. You can eat a healthy, balanced diet by making a few small changes. Follow-up care is a key part of your treatment and safety. Be sure to make and go to all appointments, and call your doctor if you are having problems. It's also a good idea to know your test results and keep a list of the medicines you take. How can you care for yourself at home? Look at what you eat  · Keep a food diary for a week or two and record everything you eat or drink. Track the number of servings you eat from each food group. · For a balanced diet every day, eat a variety of:  ? 6 or more ounce-equivalents of grains, such as cereals, breads, crackers, rice, or pasta, every day. An ounce-equivalent is 1 slice of bread, 1 cup of ready-to-eat cereal, or ½ cup of cooked rice, cooked pasta, or cooked cereal.  ? 2½ cups of vegetables, especially:  § Dark-green vegetables such as broccoli and spinach. § Orange vegetables such as carrots and sweet potatoes. § Dry beans (such as godfrey and kidney beans) and peas (such as lentils). ? 2 cups of fresh, frozen, or canned fruit. A small apple or 1 banana or orange equals 1 cup. ? 3 cups of nonfat or low-fat milk, yogurt, or other milk products. ? 5½ ounces of meat and beans, such as chicken, fish, lean meat, beans, nuts, and seeds.  One egg, 1 tablespoon of peanut butter, ½ ounce nuts or seeds, or ¼ cup of cooked beans equals 1 ounce of meat. · Learn how to read food labels for serving sizes and ingredients. Fast-food and convenience-food meals often contain few or no fruits or vegetables. Make sure you eat some fruits and vegetables to make the meal more nutritious. · Look at your food diary. For each food group, add up what you have eaten and then divide the total by the number of days. This will give you an idea of how much you are eating from each food group. See if you can find some ways to change your diet to make it more healthy. Start small  · Do not try to make dramatic changes to your diet all at once. You might feel that you are missing out on your favorite foods and then be more likely to fail. · Start slowly, and gradually change your habits. Try some of the following:  ? Use whole wheat bread instead of white bread. ? Use nonfat or low-fat milk instead of whole milk. ? Eat brown rice instead of white rice, and eat whole wheat pasta instead of white-flour pasta. ? Try low-fat cheeses and low-fat yogurt. ? Add more fruits and vegetables to meals and have them for snacks. ? Add lettuce, tomato, cucumber, and onion to sandwiches. ? Add fruit to yogurt and cereal.  Enjoy food  · You can still eat your favorite foods. You just may need to eat less of them. If your favorite foods are high in fat, salt, and sugar, limit how often you eat them, but do not cut them out entirely. · Eat a wide variety of foods. Make healthy choices when eating out  · The type of restaurant you choose can help you make healthy choices. Even fast-food chains are now offering more low-fat or healthier choices on the menu. · Choose smaller portions, or take half of your meal home. · When eating out, try:  ? A veggie pizza with a whole wheat crust or grilled chicken (instead of sausage or pepperoni).   ? Pasta with roasted vegetables, grilled chicken, or marinara sauce instead of cream sauce. ? A vegetable wrap or grilled chicken wrap. ? Broiled or poached food instead of fried or breaded items. Make healthy choices easy  · Buy packaged, prewashed, ready-to-eat fresh vegetables and fruits, such as baby carrots, salad mixes, and chopped or shredded broccoli and cauliflower. · Buy packaged, presliced fruits, such as melon or pineapple. · Choose 100% fruit or vegetable juice instead of soda. Limit juice intake to 4 to 6 oz (½ to ¾ cup) a day. · Blend low-fat yogurt, fruit juice, and canned or frozen fruit to make a smoothie for breakfast or a snack. Where can you learn more? Go to http://florin-kevin.info/. Enter T756 in the search box to learn more about \"Eating Healthy Foods: Care Instructions. \"  Current as of: March 28, 2018  Content Version: 11.9  © 5034-3277 [employer], [employer]. Care instructions adapted under license by [employer] (which disclaims liability or warranty for this information). If you have questions about a medical condition or this instruction, always ask your healthcare professional. [place] [address] any warranty or liability for your use of this information.

## 2019-04-22 NOTE — PROGRESS NOTES
Jd Jackson  68 y.o. female  1945  [address] [facility]  [id number]     [facility]: Progress Note       Encounter Date: 4/22/2019    Chief Complaint   Patient presents with    Labs     History of Present Illness   Jd Jackson is a 68 y.o. female who presents to clinic today for:    [facility] (Dr. Butch Carrillo) compliant with medications. Next appointment 04/25/2019. Denies SI/HI. Sometimes hear voices but denies them telling her to do bad things. Denies hallucinations. HTN-does not take her BP at home. Denies recent hospitalizations. Compliant with blood pressure medication. Denies-h/a, vision changes, LE edema, SOB, CP, TIA. Voiding and stooling as normal. Hydrates with water; occasional diet soda. Bake her foods. Patient walking with a cane this AM-states she has arthritis in her knees but states the pain comes and goes. Denies wanting further workup at this time. States she lives alone but her daughter lives nearby and checks on her often. No CC today-states she is here to get a \"checkup and labs\". Health Maintenance    Health Maintenance Due   Topic Date Due    DTaP/Tdap/Td series (1 - Tdap) 07/07/1966    Shingrix Vaccine Age 50> (1 of 2) 07/07/1995    GLAUCOMA SCREENING Q2Y  07/07/2010    FOBT Q 1 YEAR AGE 50-75  02/24/2016    BREAST CANCER SCRN MAMMOGRAM  04/14/2016    FOOT EXAM Q1  01/24/2019     Review of Systems   Review of Systems   Constitutional: Negative. HENT: Negative. Eyes: Negative. Respiratory: Negative. Cardiovascular: Negative. Gastrointestinal: Negative. Genitourinary: Negative. Musculoskeletal: Negative. Skin: Negative. Neurological: Negative. Endo/Heme/Allergies: Negative. Psychiatric/Behavioral: Negative.         Vitals/Objective:     Vitals:    04/22/19 0759   BP: 110/71   Pulse: 80   Resp: 20   Temp: 97.2 °F (36.2 °C)   TempSrc: Oral   SpO2: 98%   Weight: 191 lb 9.6 oz (86.9 kg)   Height: 5' 2\" (1.575 m)     Body mass index is 35.04 kg/m². Physical Exam   Constitutional: She is oriented to person, place, and time. She is active and cooperative. HENT:   Head: Normocephalic. Nose: Nose normal.   Mouth/Throat: Uvula is midline, oropharynx is clear and moist and mucous membranes are normal.   Eyes: Pupils are equal, round, and reactive to light. Conjunctivae and lids are normal.   Neck: Trachea normal, normal range of motion, full passive range of motion without pain and phonation normal. Neck supple. No thyroid mass and no thyromegaly present. Cardiovascular: Normal rate, regular rhythm, normal heart sounds and normal pulses. Pulmonary/Chest: Effort normal and breath sounds normal.   Abdominal: Soft. Bowel sounds are normal.   Lymphadenopathy:     She has no cervical adenopathy. Neurological: She is alert and oriented to person, place, and time. Gait abnormal.   Skin: Skin is warm, dry and intact. Psychiatric: She has a normal mood and affect. Her speech is normal and behavior is normal. Judgment and thought content normal. Cognition and memory are normal.       No results found for this or any previous visit (from the past 24 hour(s)). Assessment and Plan:   1. Undifferentiated schizophrenia ([address]      2. Bilateral chronic knee pain  - diclofenac EC (VOLTAREN) 75 mg EC tablet; TAKE ONE TABLET BY MOUTH TWICE DAILY AS NEEDED  Dispense: 60 Tab; Refill: 2    3. Chronic pain of both knees Right worse then left    - diclofenac EC (VOLTAREN) 75 mg EC tablet; TAKE ONE TABLET BY MOUTH TWICE DAILY AS NEEDED  Dispense: 60 Tab; Refill: 2    4. Essential hypertension    - lisinopril (PRINIVIL, ZESTRIL) 40 mg tablet; TAKE ONE TABLET BY MOUTH EVERY DAY  Dispense: 90 Tab; Refill: 0  - METABOLIC PANEL, COMPREHENSIVE  - LIPID PANEL    5. Type 2 diabetes with nephropathy (HCC)    - METABOLIC PANEL, COMPREHENSIVE  - HEMOGLOBIN A1C WITH EAG    6. Severe obesity (BMI 35.0-39. 9) with comorbidity ([address]    - LIPID PANEL  - HEMOGLOBIN A1C WITH EAG    7. Abnormal CBC    - CBC WITH AUTOMATED DIFF    Awaiting labs-noted 12/2018-CBC-HBG 9. Refills per patient request-lisinopril and voltaren. F/u in 4 months assuming labs are stable. I have discussed the diagnosis with the patient and the intended plan as seen in the above orders. she has expressed understanding. The patient has received an after-visit summary and questions were answered concerning future plans. I have discussed medication side effects and warnings with the patient as well. Electronically Signed: Ramiro Davila NP     History/Allergies   Patients past medical, surgical and family histories were reviewed and updated. Past Medical History:   Diagnosis Date    Hypertension     Psychotic disorder [facility])       Past Surgical History:   Procedure Laterality Date    HX GYN       No family history on file.   Social History     Socioeconomic History    Marital status:      Spouse name: Not on file    Number of children: Not on file    Years of education: Not on file    Highest education level: Not on file   Occupational History    Not on file   Social Needs    Financial resource strain: Not on file    Food insecurity:     Worry: Not on file     Inability: Not on file    Transportation needs:     Medical: Not on file     Non-medical: Not on file   Tobacco Use    Smoking status: Former Smoker    Smokeless tobacco: Never Used   Substance and Sexual Activity    Alcohol use: No    Drug use: No    Sexual activity: Not on file   Lifestyle    Physical activity:     Days per week: Not on file     Minutes per session: Not on file    Stress: Not on file   Relationships    Social connections:     Talks on phone: Not on file     Gets together: Not on file     Attends Sabianist service: Not on file     Active member of club or organization: Not on file     Attends meetings of clubs or organizations: Not on file     Relationship status: Not on file    Intimate partner violence:     Fear of current or ex partner: Not on file     Emotionally abused: Not on file     Physically abused: Not on file     Forced sexual activity: Not on file   Other Topics Concern    Not on file   Social History Narrative    Not on file         Allergies   Allergen Reactions    Bactrim [Sulfamethoxazole-Trimethoprim] Nausea Only       Disposition     Follow-up and Dispositions  ·   Return in about 4 months (around 8/22/2019) for routine visit. No future appointments. Current Medications after this visit     Current Outpatient Medications   Medication Sig    diclofenac EC (VOLTAREN) 75 mg EC tablet TAKE ONE TABLET BY MOUTH TWICE DAILY AS NEEDED    lisinopril (PRINIVIL, ZESTRIL) 40 mg tablet TAKE ONE TABLET BY MOUTH EVERY DAY    mirtazapine (REMERON) 15 mg tablet     OLANZapine (ZYPREXA) 10 mg tablet     lurasidone (LATUDA) 80 mg tab tablet Take 1 Tab by mouth two (2) times a day. No current facility-administered medications for this visit.       Medications Discontinued During This Encounter   Medication Reason    diclofenac EC (VOLTAREN) 75 mg EC tablet Reorder    lisinopril (PRINIVIL, ZESTRIL) 40 mg tablet Reorder

## 2019-04-22 NOTE — PROGRESS NOTES
Chief Complaint   Patient presents with    Labs     Visit Vitals  /71 (BP 1 Location: Right arm, BP Patient Position: Sitting)   Pulse 80   Temp 97.2 °F (36.2 °C) (Oral)   Resp 20   Ht 5' 2\" (1.575 m)   Wt 191 lb 9.6 oz (86.9 kg)   SpO2 98%   BMI 35.04 kg/m²     1. Have you been to the ER, urgent care clinic since your last visit? Hospitalized since your last visit? No    2. Have you seen or consulted any other health care providers outside of the [address] [place] [address] since your last visit? Include any pap smears or colon screening.  No    Reviewed record in preparation for visit and have necessary documentation  Pt did not bring medication to office visit for review  opportunity was given for questions  Goals that were addressed and/or need to be completed during or after this appointment include   Health Maintenance Due   Topic Date Due    DTaP/Tdap/Td series (1 - Tdap) 07/07/1966    Shingrix Vaccine Age 50> (1 of 2) 07/07/1995    GLAUCOMA SCREENING Q2Y  07/07/2010    FOBT Q 1 YEAR AGE 50-75  02/24/2016    BREAST CANCER SCRN MAMMOGRAM  04/14/2016    FOOT EXAM Q1  01/24/2019

## 2019-04-23 ENCOUNTER — TELEPHONE (OUTPATIENT)
Dept: FAMILY MEDICINE CLINIC | Age: 74
End: 2019-04-23

## 2019-04-23 LAB
ALBUMIN SERPL-MCNC: 4.4 G/DL (ref 3.5–4.8)
ALBUMIN/GLOB SERPL: 1.5 {RATIO} (ref 1.2–2.2)
ALP SERPL-CCNC: 55 IU/L (ref 39–117)
ALT SERPL-CCNC: 10 IU/L (ref 0–32)
AST SERPL-CCNC: 12 IU/L (ref 0–40)
BASOPHILS # BLD AUTO: 0 X10E3/UL (ref 0–0.2)
BASOPHILS NFR BLD AUTO: 1 %
BILIRUB SERPL-MCNC: 0.3 MG/DL (ref 0–1.2)
BUN SERPL-MCNC: 15 MG/DL (ref 8–27)
BUN/CREAT SERPL: 19 (ref 12–28)
CALCIUM SERPL-MCNC: 9.2 MG/DL (ref 8.7–10.3)
CHLORIDE SERPL-SCNC: 103 MMOL/L (ref 96–106)
CHOLEST SERPL-MCNC: 189 MG/DL (ref 100–199)
CO2 SERPL-SCNC: 24 MMOL/L (ref 20–29)
CREAT SERPL-MCNC: 0.8 MG/DL (ref 0.57–1)
EOSINOPHIL # BLD AUTO: 0.1 X10E3/UL (ref 0–0.4)
EOSINOPHIL NFR BLD AUTO: 3 %
ERYTHROCYTE [DISTWIDTH] IN BLOOD BY AUTOMATED COUNT: 14.4 % (ref 12.3–15.4)
EST. AVERAGE GLUCOSE BLD GHB EST-MCNC: 117 MG/DL
GLOBULIN SER CALC-MCNC: 3 G/DL (ref 1.5–4.5)
GLUCOSE SERPL-MCNC: 97 MG/DL (ref 65–99)
HBA1C MFR BLD: 5.7 % (ref 4.8–5.6)
HCT VFR BLD AUTO: 31.1 % (ref 34–46.6)
HDLC SERPL-MCNC: 61 MG/DL
HGB BLD-MCNC: 9.8 G/DL (ref 11.1–15.9)
IMM GRANULOCYTES # BLD AUTO: 0 X10E3/UL (ref 0–0.1)
IMM GRANULOCYTES NFR BLD AUTO: 0 %
LDLC SERPL CALC-MCNC: 119 MG/DL (ref 0–99)
LYMPHOCYTES # BLD AUTO: 1.2 X10E3/UL (ref 0.7–3.1)
LYMPHOCYTES NFR BLD AUTO: 24 %
MCH RBC QN AUTO: 29.9 PG (ref 26.6–33)
MCHC RBC AUTO-ENTMCNC: 31.5 G/DL (ref 31.5–35.7)
MCV RBC AUTO: 95 FL (ref 79–97)
MONOCYTES # BLD AUTO: 0.3 X10E3/UL (ref 0.1–0.9)
MONOCYTES NFR BLD AUTO: 5 %
NEUTROPHILS # BLD AUTO: 3.3 X10E3/UL (ref 1.4–7)
NEUTROPHILS NFR BLD AUTO: 67 %
PLATELET # BLD AUTO: 286 X10E3/UL (ref 150–379)
POTASSIUM SERPL-SCNC: 4.1 MMOL/L (ref 3.5–5.2)
PROT SERPL-MCNC: 7.4 G/DL (ref 6–8.5)
RBC # BLD AUTO: 3.28 X10E6/UL (ref 3.77–5.28)
SODIUM SERPL-SCNC: 140 MMOL/L (ref 134–144)
TRIGL SERPL-MCNC: 47 MG/DL (ref 0–149)
VLDLC SERPL CALC-MCNC: 9 MG/DL (ref 5–40)
WBC # BLD AUTO: 4.8 X10E3/UL (ref 3.4–10.8)

## 2019-04-23 NOTE — TELEPHONE ENCOUNTER
Attempted to discuss recommendations for a colonoscopy due to low but stable Hgb. Patient states she does not want the testing and hung up on NP. Lab letter sent.

## 2019-04-25 DIAGNOSIS — Z12.11 ENCOUNTER FOR SCREENING COLONOSCOPY: Primary | ICD-10-CM

## 2019-04-25 DIAGNOSIS — D58.2 ABNORMAL HEMOGLOBIN (HCC): ICD-10-CM

## 2019-04-25 NOTE — TELEPHONE ENCOUNTER
Patient called per NP:  Please call patient and ask where would she like to go for [facility] Patient states she will like to go to [place] since it is closer.

## 2019-08-03 DIAGNOSIS — I10 ESSENTIAL HYPERTENSION: ICD-10-CM

## 2019-08-05 RX ORDER — LISINOPRIL 40 MG/1
TABLET ORAL
Qty: 90 TAB | Refills: 0 | Status: SHIPPED | OUTPATIENT
Start: 2019-08-05 | End: 2019-10-30 | Stop reason: SDUPTHER

## 2019-08-06 ENCOUNTER — OFFICE VISIT (OUTPATIENT)
Dept: FAMILY MEDICINE CLINIC | Age: 74
End: 2019-08-06

## 2019-08-06 VITALS
BODY MASS INDEX: 35.22 KG/M2 | RESPIRATION RATE: 16 BRPM | WEIGHT: 191.4 LBS | SYSTOLIC BLOOD PRESSURE: 162 MMHG | OXYGEN SATURATION: 99 % | HEART RATE: 72 BPM | HEIGHT: 62 IN | TEMPERATURE: 98.5 F | DIASTOLIC BLOOD PRESSURE: 88 MMHG

## 2019-08-06 DIAGNOSIS — R06.2 WHEEZING: ICD-10-CM

## 2019-08-06 DIAGNOSIS — R05.9 COUGH: Primary | ICD-10-CM

## 2019-08-06 DIAGNOSIS — F20.3 UNDIFFERENTIATED SCHIZOPHRENIA (HCC): ICD-10-CM

## 2019-08-06 DIAGNOSIS — J40 WHEEZY BRONCHITIS: ICD-10-CM

## 2019-08-06 DIAGNOSIS — I10 ESSENTIAL HYPERTENSION: ICD-10-CM

## 2019-08-06 DIAGNOSIS — E11.9 DIABETES MELLITUS TYPE 2, DIET-CONTROLLED (HCC): ICD-10-CM

## 2019-08-06 RX ORDER — PREDNISONE 20 MG/1
20 TABLET ORAL DAILY
Qty: 5 TAB | Refills: 0 | Status: SHIPPED | OUTPATIENT
Start: 2019-08-06 | End: 2019-08-16 | Stop reason: ALTCHOICE

## 2019-08-06 RX ORDER — AZITHROMYCIN 250 MG/1
TABLET, FILM COATED ORAL
Qty: 6 TAB | Refills: 0 | Status: SHIPPED | OUTPATIENT
Start: 2019-08-06 | End: 2019-08-16 | Stop reason: ALTCHOICE

## 2019-08-06 NOTE — PROGRESS NOTES
[address], [address] [place] [address]  [phone]           Progress Note    Patient: Bahman Mena MRN: [id number]  SSN: [id number]    YOB: 1945  Age: 76 y.o. Sex: female        Chief Complaint   Patient presents with    Cough    Wheezing         Subjective: This is a new patient to me. Encounter Diagnoses   Name Primary?  Cough: She says she has had a cough for up to 2 months. She cannot cough the sputum up she will color it is. She denies fevers chills or sweats. She is on lisinopril. Yes    Wheezing: She has wheezing on exam and peak flows predicted of 391 by readings of 210/240/250. unfortunately because of her schizophrenia and mental retardation I do not think she could use albuterol appropriately. For that reason I will have to give her a 5-day course of prednisone. If her wheezing does not resolve with treatment for an asthmatic bronchitis. It is likely she has a reaction to lisinopril. While the cough is very common with lisinopril for thi amount of wheezing is not common.  Essential hypertension:  BP Readings from Last 3 Encounters:   08/06/19 162/88   04/22/19 110/71   01/10/19 149/80     The patient reports:  taking medications as instructed, no medication side effects noted, no TIA's, no chest pain on exertion, no dyspnea on exertion, no swelling of ankles.     Key CAD CHF Meds             lisinopril (PRINIVIL, ZESTRIL) 40 mg tablet (Taking) TAKE ONE TABLET BY MOUTH EVERY DAY           Lab Results   Component Value Date/Time    Sodium 140 04/22/2019 11:05 AM    Potassium 4.1 04/22/2019 11:05 AM    Chloride 103 04/22/2019 11:05 AM    CO2 24 04/22/2019 11:05 AM    Glucose 97 04/22/2019 11:05 AM    BUN 15 04/22/2019 11:05 AM    Creatinine 0.80 04/22/2019 11:05 AM    BUN/Creatinine ratio 19 04/22/2019 11:05 AM    GFR est AA 85 04/22/2019 11:05 AM    GFR est non-AA 73 04/22/2019 11:05 AM    Calcium 9.2 04/22/2019 11:05 AM    Bilirubin, total 0.3 04/22/2019 11:05 AM    AST (SGOT) 12 04/22/2019 11:05 AM    Alk. phosphatase 55 04/22/2019 11:05 AM    Protein, total 7.4 04/22/2019 11:05 AM    Albumin 4.4 04/22/2019 11:05 AM    A-G Ratio 1.5 04/22/2019 11:05 AM    ALT (SGPT) 10 04/22/2019 11:05 AM     Low salt diet? yes  Aerobic exercise? no  Our goal is to normalize the blood pressure to decrease the risks of strokes and heart attacks. The patient is in agreement with the plan.  Wheezy bronchitis: Seems to be most likely diagnosis with a wet cough and wheezing.  Diabetes mellitus type 2, diet-controlled ([facility]): This patient is managed under a comprehensive plan of care for Diabetes. Overall the patient feels well with good energy level. Key Antihyperglycemic Medications     Patient is on no antihyperglycemic meds. Pertinent Labs:   Lab Results   Component Value Date/Time    Hemoglobin A1c 5.7 (H) 04/22/2019 11:05 AM    Hemoglobin A1c 5.5 12/17/2018 01:25 PM    Hemoglobin A1c 5.6 05/24/2018 11:51 AM      Body mass index is 35.01 kg/m². Lab Results   Component Value Date/Time    LDL, calculated 119 (H) 04/22/2019 11:05 AM         Lab Results   Component Value Date/Time    Sodium 140 04/22/2019 11:05 AM    Potassium 4.1 04/22/2019 11:05 AM    Chloride 103 04/22/2019 11:05 AM    CO2 24 04/22/2019 11:05 AM    Glucose 97 04/22/2019 11:05 AM    BUN 15 04/22/2019 11:05 AM    Creatinine 0.80 04/22/2019 11:05 AM    BUN/Creatinine ratio 19 04/22/2019 11:05 AM    GFR est AA 85 04/22/2019 11:05 AM    GFR est non-AA 73 04/22/2019 11:05 AM    Calcium 9.2 04/22/2019 11:05 AM    AST (SGOT) 12 04/22/2019 11:05 AM    Alk.  phosphatase 55 04/22/2019 11:05 AM    Protein, total 7.4 04/22/2019 11:05 AM    Albumin 4.4 04/22/2019 11:05 AM    A-G Ratio 1.5 04/22/2019 11:05 AM    ALT (SGPT) 10 04/22/2019 11:05 AM     Lab Results   Component Value Date/Time    Microalb/Creat ratio (ug/mg creat.) <16.5 05/24/2018 11:51 AM          Wt Readings from Last 3 Encounters:   08/06/19 191 lb 6.4 oz (86.8 kg)   04/22/19 191 lb 9.6 oz (86.9 kg)   01/10/19 182 lb (82.6 kg)        Social History     Tobacco Use   Smoking Status Former Smoker   Smokeless Tobacco Never Used     Body mass index is 35.01 kg/m². Diabetic Consultants: All the patient's questions regarding medications, diet and exercise were answered. Goal of A1C of less than 7.5% is our goal.   Our overall goal is to reduce or eliminate the long term consequences of poorly controlled diabetes.  Undifferentiated schizophrenia ([facility]): Alert, pleasant, cooperative. Since this is my first visit with her I do not know how reliable her history is. She has a diagnosis of mental retardation as well. Current and past medical information:    Current Medications after this visit[de-identified]     Current Outpatient Medications   Medication Sig    predniSONE (DELTASONE) 20 mg tablet Take 20 mg by mouth daily.  azithromycin (ZITHROMAX) 250 mg tablet 2 today and then 1 daily until finished. Indications: bronchitis    lisinopril (PRINIVIL, ZESTRIL) 40 mg tablet TAKE ONE TABLET BY MOUTH EVERY DAY    diclofenac EC (VOLTAREN) 75 mg EC tablet TAKE ONE TABLET BY MOUTH TWICE DAILY AS NEEDED    mirtazapine (REMERON) 15 mg tablet     OLANZapine (ZYPREXA) 10 mg tablet     lurasidone (LATUDA) 80 mg tab tablet Take 1 Tab by mouth two (2) times a day. No current facility-administered medications for this visit. Patient Active Problem List    Diagnosis Date Noted    Primary osteoarthritis of left knee 09/21/2018    Valgus deformity, not elsewhere classified, left knee 09/21/2018    Type 2 diabetes with nephropathy ([facility] 06/11/2018    Severe obesity (BMI 35.0-39. 9) with comorbidity ([facility] 04/13/2018    Tardive dyskinesia 11/28/2014    Schizophrenia ([facility] 11/28/2014    Diabetes mellitus type 2, diet-controlled ([facility] 07/25/2012    HTN (hypertension) 07/09/2012     (mental retardation) 07/09/2012       Past Medical History:   Diagnosis Date    Hypertension     Psychotic disorder (HCC)        Allergies   Allergen Reactions    Bactrim [Sulfamethoxazole-Trimethoprim] Nausea Only       Past Surgical History:   Procedure Laterality Date    HX GYN         Social History     Socioeconomic History    Marital status:      Spouse name: Not on file    Number of children: Not on file    Years of education: Not on file    Highest education level: Not on file   Tobacco Use    Smoking status: Former Smoker    Smokeless tobacco: Never Used   Substance and Sexual Activity    Alcohol use: No    Drug use: No       Review of Systems   Constitutional: Negative. Negative for chills, fever, malaise/fatigue and weight loss. HENT: Negative. Negative for hearing loss. Eyes: Negative. Negative for blurred vision and double vision. Respiratory: Negative. Negative for cough, sputum production and shortness of breath. Cardiovascular: Negative. Negative for chest pain and palpitations. Gastrointestinal: Negative. Negative for abdominal pain, blood in stool, heartburn, nausea and vomiting. Genitourinary: Negative. Negative for dysuria, frequency and urgency. Musculoskeletal: Negative. Negative for back pain, falls, myalgias and neck pain. Skin: Negative. Negative for rash. Neurological: Negative. Negative for dizziness, tingling, tremors, weakness and headaches. Endo/Heme/Allergies: Negative. Psychiatric/Behavioral: Negative. Negative for depression. Objective:     Vitals:    08/06/19 0954 08/06/19 1022 08/06/19 1101   BP: 177/87 (!) 168/92 162/88   Pulse: 72     Resp: 16     Temp: 98.5 °F (36.9 °C)     TempSrc: Oral     SpO2: 99%     Weight: 191 lb 6.4 oz (86.8 kg)     Height: 5' 2\" (1.575 m)        Body mass index is 35.01 kg/m².     Physical Exam   Constitutional: She is oriented to person, place, and time and well-developed, well-nourished, and in no distress. No distress. HENT:   Head: Normocephalic and atraumatic. Mouth/Throat: Oropharynx is clear and moist.   Eyes: Conjunctivae are normal.   Neck: No thyromegaly present. No carotid bruit. Cardiovascular: Normal rate, regular rhythm and normal heart sounds. Exam reveals no friction rub. No murmur heard. Pulmonary/Chest: Effort normal and breath sounds normal. No respiratory distress. She has no wheezes. She has no rales. Abdominal: Soft. She exhibits no distension. There is no tenderness. There is no rebound and no guarding. Neurological: She is alert and oriented to person, place, and time. Skin: Skin is warm. No rash noted. She is not diaphoretic. No erythema. Psychiatric: Mood and affect normal.   Nursing note and vitals reviewed. Health Maintenance Due she has multiple preventive healthcare needs that cannot be addressed at an acute visit. Topic Date Due    DTaP/Tdap/Td series (1 - Tdap) 07/07/1966    Shingrix Vaccine Age 50> (1 of 2) 07/07/1995    GLAUCOMA SCREENING Q2Y  07/07/2010    EYE EXAM RETINAL OR DILATED  11/12/2015    FOBT Q 1 YEAR AGE 50-75  02/24/2016    BREAST CANCER SCRN MAMMOGRAM  04/14/2016    FOOT EXAM Q1  01/24/2019    MICROALBUMIN Q1  05/24/2019    Influenza Age 9 to Adult  08/01/2019         Assessment and orders:     Encounter Diagnoses     ICD-10-CM ICD-9-CM   1. Cough R05 786.2   2. Wheezing R06.2 786.07   3. Essential hypertension I10 401.9   4. Wheezy bronchitis J40 490   5. Diabetes mellitus type 2, diet-controlled (HCC) E11.9 250.00   6. Undifferentiated schizophrenia ([facility] F20.3 295.90     Diagnoses and all orders for this visit:    1. Cough-treated her for acute asthmatic bronchitis until he get a formal reading back from the radiologist on her chest x-ray. It appears to me she has increased interstitial markings. -     XR CHEST PA LAT; Future    2.  Wheezing: Certainly has decreased peak flow consistent with asthma or asthmatic bronchitis. She says she is never had asthma before. I do not know how reliable her history is but asthma is not on her problem list.  -     XR CHEST PA LAT; Future    3. Essential hypertension-her blood pressure is not controlled. Recheck in 1 week. 4. Wheezy bronchitis-presumptive. If this does not improve her symptoms in 1 week consider stopping her lisinopril and if her chest x-ray is read as having increased interstitial markings she will need a more formal pulmonary work-up. -     predniSONE (DELTASONE) 20 mg tablet; Take 20 mg by mouth daily. -     azithromycin (ZITHROMAX) 250 mg tablet; 2 today and then 1 daily until finished. Indications: bronchitis      5. Diabetes mellitus type 2, diet-controlled (HCC)-last A1c was 5.7. She should be able to tolerate 5 days worth of prednisone with no difficulty. 6. Undifferentiated schizophrenia (HCC)-this plus mental retardation complicates evaluation. I am surprised that she could have had the symptoms for 2 months and not salt treatment prior to now. Plan of care:  Discussed diagnoses in detail with patient. Medication risks/benefits/side effects discussed with patient. All of the patient's questions were addressed. The patient understands and agrees with our plan of care. The patient knows to call back if they are unsure of or forget any changes we discussed today or if the symptoms change. The patient received an After-Visit Summary which contains VS, orders, medication list and allergy list. This can be used as a \"mini-medical record\" should they have to seek medical care while out of town.     Patient Care Team:  Deepak Gan MD as PCP - General (Family Practice)  Lucy Church MD (Gastroenterology)  Sheila Gage MD as Consulting Provider (Psychiatry)  Robin Sutton MD (Ophthalmology)  Zeny Puckett MD (Orthopedic Surgery)  Samreen Chance MD (Cardiology)    Follow-up and Dispositions    · Return in about 1 week (around 8/13/2019). Future Appointments   Date Time Provider Clovis Jackson   8/16/2019 11:10 AM [facility]       Signed By: Liam Kaur MD     August 6, 2019      ATTENTION:   This medical record was transcribed using an electronic medical records/speech recognition system. Although proofread, it may and can contain electronic, spelling and other errors. Corrections may be executed at a later time. Please feel free to contact me for any clarifications as needed.

## 2019-08-06 NOTE — PROGRESS NOTES
Chief Complaint   Patient presents with    Cough    Wheezing     Body mass index is 35.01 kg/m². 1. Have you been to the ER, urgent care clinic since your last visit? Hospitalized since your last visit? No    2. Have you seen or consulted any other health care providers outside of the [address] [place] [address] since your last visit? Include any pap smears or colon screening.  No    Reviewed record in preparation for visit and have necessary documentation  Pt did not bring medication to office visit for review  Information was given to pt on Advanced Directives, Living Will  Information was given on Shingles Vaccine  Opportunity was given for questions  Goals that were addressed and/or need to be completed after this appointment include:     Health Maintenance Due   Topic Date Due    DTaP/Tdap/Td series (1 - Tdap) 07/07/1966    Shingrix Vaccine Age 50> (1 of 2) 07/07/1995    GLAUCOMA SCREENING Q2Y  07/07/2010    EYE EXAM RETINAL OR DILATED  11/12/2015    FOBT Q 1 YEAR AGE 50-75  02/24/2016    BREAST CANCER SCRN MAMMOGRAM  04/14/2016    FOOT EXAM Q1  01/24/2019    MICROALBUMIN Q1  05/24/2019    Influenza Age 9 to Adult  08/01/2019       Peak Flow Performed:    1. 210  2. 240  3. 250    Predicted average peak expiratory flow based on patients height and age should be 391

## 2019-08-06 NOTE — PATIENT INSTRUCTIONS
Bronchitis: Care Instructions  Your Care Instructions    Bronchitis is inflammation of the bronchial tubes, which carry air to the lungs. The tubes swell and produce mucus, or phlegm. The mucus and inflamed bronchial tubes make you cough. You may have trouble breathing. Most cases of bronchitis are caused by viruses like those that cause colds. Antibiotics usually do not help and they may be harmful. Bronchitis usually develops rapidly and lasts about 2 to 3 weeks in otherwise healthy people. Follow-up care is a key part of your treatment and safety. Be sure to make and go to all appointments, and call your doctor if you are having problems. It's also a good idea to know your test results and keep a list of the medicines you take. How can you care for yourself at home? · Take all medicines exactly as prescribed. Call your doctor if you think you are having a problem with your medicine. · Get some extra rest.  · Take an over-the-counter pain medicine, such as acetaminophen (Tylenol), ibuprofen (Advil, Motrin), or naproxen (Aleve) to reduce fever and relieve body aches. Read and follow all instructions on the label. · Do not take two or more pain medicines at the same time unless the doctor told you to. Many pain medicines have acetaminophen, which is Tylenol. Too much acetaminophen (Tylenol) can be harmful. · Take an over-the-counter cough medicine that contains dextromethorphan to help quiet a dry, hacking cough so that you can sleep. Avoid cough medicines that have more than one active ingredient. Read and follow all instructions on the label. · Breathe moist air from a humidifier, hot shower, or sink filled with hot water. The heat and moisture will thin mucus so you can cough it out. · Do not smoke. Smoking can make bronchitis worse. If you need help quitting, talk to your doctor about stop-smoking programs and medicines. These can increase your chances of quitting for good.   When should you call for help? Call 911 anytime you think you may need emergency care. For example, call if:    · You have severe trouble breathing.    Call your doctor now or seek immediate medical care if:    · You have new or worse trouble breathing.     · You cough up dark brown or bloody mucus (sputum).     · You have a new or higher fever.     · You have a new rash.    Watch closely for changes in your health, and be sure to contact your doctor if:    · You cough more deeply or more often, especially if you notice more mucus or a change in the color of your mucus.     · You are not getting better as expected. Where can you learn more? Go to http://florin-kevin.info/. Enter H333 in the search box to learn more about \"Bronchitis: Care Instructions. \"  Current as of: September 5, 2018  Content Version: 12.1  © 5924-5660 [employer]. Care instructions adapted under license by [employer] (which disclaims liability or warranty for this information). If you have questions about a medical condition or this instruction, always ask your healthcare professional. [address] any warranty or liability for your use of this information.

## 2019-08-16 ENCOUNTER — OFFICE VISIT (OUTPATIENT)
Dept: FAMILY MEDICINE CLINIC | Age: 74
End: 2019-08-16

## 2019-08-16 VITALS
RESPIRATION RATE: 18 BRPM | SYSTOLIC BLOOD PRESSURE: 157 MMHG | DIASTOLIC BLOOD PRESSURE: 85 MMHG | OXYGEN SATURATION: 99 % | BODY MASS INDEX: 35.19 KG/M2 | HEART RATE: 72 BPM | TEMPERATURE: 98.7 F | WEIGHT: 191.2 LBS | HEIGHT: 62 IN

## 2019-08-16 DIAGNOSIS — I10 ESSENTIAL HYPERTENSION: ICD-10-CM

## 2019-08-16 DIAGNOSIS — E11.21 TYPE 2 DIABETES WITH NEPHROPATHY (HCC): ICD-10-CM

## 2019-08-16 DIAGNOSIS — J45.909 ACUTE ASTHMATIC BRONCHITIS: Primary | ICD-10-CM

## 2019-08-16 DIAGNOSIS — D63.8 ANEMIA, CHRONIC DISEASE: ICD-10-CM

## 2019-08-16 RX ORDER — AMLODIPINE BESYLATE 2.5 MG/1
2.5 TABLET ORAL DAILY
Qty: 30 TAB | Refills: 4 | Status: SHIPPED | OUTPATIENT
Start: 2019-08-16 | End: 2022-06-27

## 2019-08-16 NOTE — PROGRESS NOTES
1. Have you been to the ER, urgent care clinic since your last visit? Hospitalized since your last visit? No    2. Have you seen or consulted any other health care providers outside of the [address] [place] [address] since your last visit? Include any pap smears or colon screening.  No   Reviewed record in preparation for visit and have necessary documentation  Pt did not bring medication to office visit for review  Information was given to pt on Advanced Directives, Living Will  Information was given on Shingles Vaccine  opportunity was given for questions  Goals that were addressed and/or need to be completed during or after this appointment include     Health Maintenance Due   Topic Date Due    DTaP/Tdap/Td series (1 - Tdap) 07/07/1966    Shingrix Vaccine Age 50> (1 of 2) 07/07/1995    GLAUCOMA SCREENING Q2Y  07/07/2010    EYE EXAM RETINAL OR DILATED  11/12/2015    FOBT Q 1 YEAR AGE 50-75  02/24/2016    BREAST CANCER SCRN MAMMOGRAM  04/14/2016    FOOT EXAM Q1  01/24/2019    MICROALBUMIN Q1  05/24/2019    Influenza Age 9 to Adult  08/01/2019

## 2019-08-16 NOTE — PROGRESS NOTES
[address], [address] [facility]  [phone]           Progress Note    Patient: Linda Barclay MRN: [id number]  SSN: [id number]    YOB: 1945  Age: 76 y.o. Sex: female        Chief Complaint   Patient presents with    Cough    Follow-up         Subjective:     Encounter Diagnoses   Name Primary?  Acute asthmatic bronchitis: She returns today for follow-up of her asthmatic bronchitis. She has no wheezing no shortness of breath no cough and no sputum production. Her lungs are back to normal.     Yes    Essential hypertension: On 2 separate readings today her blood pressure is not controlled. BP Readings from Last 3 Encounters:   08/16/19 157/85   08/06/19 162/88   04/22/19 110/71     The patient reports:  taking medications as instructed, no medication side effects noted, no TIA's, no chest pain on exertion, no dyspnea on exertion, no swelling of ankles. Key CAD CHF Meds             amLODIPine (NORVASC) 2.5 mg tablet (Taking) Take 1 Tab by mouth daily. lisinopril (PRINIVIL, ZESTRIL) 40 mg tablet (Taking) TAKE ONE TABLET BY MOUTH EVERY DAY           Lab Results   Component Value Date/Time    Sodium 140 04/22/2019 11:05 AM    Potassium 4.1 04/22/2019 11:05 AM    Chloride 103 04/22/2019 11:05 AM    CO2 24 04/22/2019 11:05 AM    Glucose 97 04/22/2019 11:05 AM    BUN 15 04/22/2019 11:05 AM    Creatinine 0.80 04/22/2019 11:05 AM    BUN/Creatinine ratio 19 04/22/2019 11:05 AM    GFR est AA 85 04/22/2019 11:05 AM    GFR est non-AA 73 04/22/2019 11:05 AM    Calcium 9.2 04/22/2019 11:05 AM    Bilirubin, total 0.3 04/22/2019 11:05 AM    AST (SGOT) 12 04/22/2019 11:05 AM    Alk. phosphatase 55 04/22/2019 11:05 AM    Protein, total 7.4 04/22/2019 11:05 AM    Albumin 4.4 04/22/2019 11:05 AM    A-G Ratio 1.5 04/22/2019 11:05 AM    ALT (SGPT) 10 04/22/2019 11:05 AM     Low salt diet? yes  Aerobic exercise? no  Our goal is to normalize the blood pressure to decrease the risks of strokes and heart attacks. The patient is in agreement with the plan.  Type 2 diabetes with nephropathy [facility]): She seems to take good care of herself given her diagnoses of schizophrenia and mental retardation. She answers all questions appropriately. This patient is managed under a comprehensive plan of care for Diabetes. Overall the patient feels well with good energy level. Key Antihyperglycemic Medications     Patient is on no antihyperglycemic meds. Pertinent Labs:   Lab Results   Component Value Date/Time    Hemoglobin A1c 5.7 (H) 04/22/2019 11:05 AM    Hemoglobin A1c 5.5 12/17/2018 01:25 PM    Hemoglobin A1c 5.6 05/24/2018 11:51 AM      Body mass index is 34.97 kg/m². Lab Results   Component Value Date/Time    LDL, calculated 119 (H) 04/22/2019 11:05 AM         Lab Results   Component Value Date/Time    Sodium 140 04/22/2019 11:05 AM    Potassium 4.1 04/22/2019 11:05 AM    Chloride 103 04/22/2019 11:05 AM    CO2 24 04/22/2019 11:05 AM    Glucose 97 04/22/2019 11:05 AM    BUN 15 04/22/2019 11:05 AM    Creatinine 0.80 04/22/2019 11:05 AM    BUN/Creatinine ratio 19 04/22/2019 11:05 AM    GFR est AA 85 04/22/2019 11:05 AM    GFR est non-AA 73 04/22/2019 11:05 AM    Calcium 9.2 04/22/2019 11:05 AM    AST (SGOT) 12 04/22/2019 11:05 AM    Alk. phosphatase 55 04/22/2019 11:05 AM    Protein, total 7.4 04/22/2019 11:05 AM    Albumin 4.4 04/22/2019 11:05 AM    A-G Ratio 1.5 04/22/2019 11:05 AM    ALT (SGPT) 10 04/22/2019 11:05 AM     Lab Results   Component Value Date/Time    Microalb/Creat ratio (ug/mg creat.) <16.5 05/24/2018 11:51 AM      Frequency of home glucose testing: No logs   Blood Sugar range at home:    Last eye exam: In past 12 months.    Last foot exam: Overdue   polyuria, polyphagia or polydipsia: No   Retinopathy: No   Neuropathy SX: No    Low blood sugar symptoms: No   Dietary compliance: Good   Medication compliance:Good   On ASA: Yes   Depression: No   CKD:no     Wt Readings from Last 3 Encounters:   08/16/19 191 lb 3.2 oz (86.7 kg)   08/06/19 191 lb 6.4 oz (86.8 kg)   04/22/19 191 lb 9.6 oz (86.9 kg)        Social History     Tobacco Use   Smoking Status Former Smoker   Smokeless Tobacco Never Used     Body mass index is 34.97 kg/m². Diabetic Consultants: All the patient's questions regarding medications, diet and exercise were answered. Goal of A1C of less than 7.5% is our goal.   Our overall goal is to reduce or eliminate the long term consequences of poorly controlled diabetes.  Anemia, chronic disease:  No sx. Lab Results   Component Value Date/Time    HGB 9.8 (L) 04/22/2019 11:05 AM     Lab Results   Component Value Date/Time    Iron 36 09/21/2018 01:08 PM    TIBC 190 (L) 09/21/2018 01:08 PM    Iron % saturation 19 09/21/2018 01:08 PM    Ferritin 445 (H) 09/21/2018 01:08 PM                   Current and past medical information:    Current Medications after this visit[de-identified]     Current Outpatient Medications   Medication Sig    amLODIPine (NORVASC) 2.5 mg tablet Take 1 Tab by mouth daily.  lisinopril (PRINIVIL, ZESTRIL) 40 mg tablet TAKE ONE TABLET BY MOUTH EVERY DAY    diclofenac EC (VOLTAREN) 75 mg EC tablet TAKE ONE TABLET BY MOUTH TWICE DAILY AS NEEDED    mirtazapine (REMERON) 15 mg tablet     OLANZapine (ZYPREXA) 10 mg tablet     lurasidone (LATUDA) 80 mg tab tablet Take 1 Tab by mouth two (2) times a day. No current facility-administered medications for this visit. Patient Active Problem List    Diagnosis Date Noted    Primary osteoarthritis of left knee 09/21/2018    Valgus deformity, not elsewhere classified, left knee 09/21/2018    Type 2 diabetes with nephropathy ([address] 06/11/2018    Severe obesity (BMI 35.0-39. 9) with comorbidity ([facility] 04/13/2018    Tardive dyskinesia 11/28/2014    Schizophrenia ([facility] 11/28/2014    Diabetes mellitus type 2, diet-controlled ([facility] 07/25/2012    HTN (hypertension) 07/09/2012    MR (mental retardation) 07/09/2012       Past Medical History:   Diagnosis Date    Hypertension     Psychotic disorder (HCC)        Allergies   Allergen Reactions    Bactrim [Sulfamethoxazole-Trimethoprim] Nausea Only       Past Surgical History:   Procedure Laterality Date    HX GYN         Social History     Socioeconomic History    Marital status:      Spouse name: Not on file    Number of children: Not on file    Years of education: Not on file    Highest education level: Not on file   Tobacco Use    Smoking status: Former Smoker    Smokeless tobacco: Never Used   Substance and Sexual Activity    Alcohol use: No    Drug use: No       Review of Systems   Constitutional: Negative. Negative for chills, fever, malaise/fatigue and weight loss. HENT: Negative. Negative for hearing loss. Eyes: Negative. Negative for blurred vision and double vision. Respiratory: Negative. Negative for cough, sputum production and shortness of breath. Cardiovascular: Negative. Negative for chest pain and palpitations. Gastrointestinal: Negative. Negative for abdominal pain, blood in stool, heartburn, nausea and vomiting. Genitourinary: Negative. Negative for dysuria, frequency and urgency. Musculoskeletal: Negative. Negative for back pain, falls, myalgias and neck pain. Skin: Negative. Negative for rash. Neurological: Negative. Negative for dizziness, tingling, tremors, weakness and headaches. Endo/Heme/Allergies: Negative. Psychiatric/Behavioral: Negative. Negative for depression. Objective:     Vitals:    08/16/19 1116 08/16/19 1141   BP: 170/81 157/85   Pulse: 72    Resp: 18    Temp: 98.7 °F (37.1 °C)    TempSrc: Oral    SpO2: 99%    Weight: 191 lb 3.2 oz (86.7 kg)    Height: 5' 2\" (1.575 m)       Body mass index is 34.97 kg/m².     Physical Exam   Constitutional: She is oriented to person, place, and time and well-developed, well-nourished, and in no distress. No distress. HENT:   Head: Normocephalic and atraumatic. Mouth/Throat: Oropharynx is clear and moist.   Eyes: Conjunctivae are normal.   Neck: No thyromegaly present. No carotid bruit. Cardiovascular: Normal rate, regular rhythm and normal heart sounds. No murmur heard. Pulmonary/Chest: Effort normal and breath sounds normal. No respiratory distress. She has no wheezes. She has no rales. Abdominal: Soft. She exhibits no distension. There is no tenderness. There is no rebound. Neurological: She is alert and oriented to person, place, and time. Skin: Skin is warm. No rash noted. She is not diaphoretic. No erythema. Psychiatric: Mood and affect normal.   Nursing note and vitals reviewed. Health Maintenance Due   Topic Date Due    DTaP/Tdap/Td series (1 - Tdap) 07/07/1966    Shingrix Vaccine Age 50> (1 of 2) 07/07/1995    GLAUCOMA SCREENING Q2Y  07/07/2010    EYE EXAM RETINAL OR DILATED  11/12/2015    FOBT Q 1 YEAR AGE 50-75  02/24/2016    BREAST CANCER SCRN MAMMOGRAM  04/14/2016    FOOT EXAM Q1  01/24/2019    MICROALBUMIN Q1  05/24/2019    Influenza Age 9 to Adult  08/01/2019         Assessment and orders:     Encounter Diagnoses     ICD-10-CM ICD-9-CM   1. Acute asthmatic bronchitis J45.909 493.90   2. Essential hypertension I10 401.9   3. Type 2 diabetes with nephropathy (HCC) E11.21 250.40     583.81   4. Anemia, chronic disease D63.8 285.29     Diagnoses and all orders for this visit:    1. Acute asthmatic bronchitis-resolved    2. Essential hypertension-we will have to add a medication. -     amLODIPine (NORVASC) 2.5 mg tablet; Take 1 Tab by mouth daily. 3. Type 2 diabetes with nephropathy (HCC)-stable    4. Anemia, chronic disease-unclear etiology. To my knowledge she has never had a sickle cell anemia test.  Suspect she might have sickle cell trait.   She has a granddaughter who is also anemic.  -     HGB SOLUBILITY W/REFL FRAC                  Plan of care:  Discussed diagnoses in detail with patient. Medication risks/benefits/side effects discussed with patient. All of the patient's questions were addressed. The patient understands and agrees with our plan of care. The patient knows to call back if they are unsure of or forget any changes we discussed today or if the symptoms change. The patient received an After-Visit Summary which contains VS, orders, medication list and allergy list. This can be used as a \"mini-medical record\" should they have to seek medical care while out of town. Patient Care Team:  Trena Lindo MD as PCP - General (Family Practice)  Herman Muse MD (Gastroenterology)  Sheila Gage MD as Consulting Provider (Psychiatry)  Taj Romeo MD (Ophthalmology)  Samantha Griffin MD (Orthopedic Surgery)  Rashaun Medina MD (Cardiology)    Follow-up and Dispositions    · Return in about 2 months (around 10/16/2019) for fasting. Future Appointments   Date Time Provider Department Center   8/23/2019  9:50 AM [facility]       Signed By: Rasheeda Mayorga MD     August 16, 2019      ATTENTION:   This medical record was transcribed using an electronic medical records/speech recognition system. Although proofread, it may and can contain electronic, spelling and other errors. Corrections may be executed at a later time. Please feel free to contact me for any clarifications as needed.

## 2019-08-16 NOTE — PATIENT INSTRUCTIONS
Anemia: Care Instructions  Your Care Instructions    Anemia is a low level of red blood cells, which carry oxygen throughout your body. Many things can cause anemia. Lack of iron is one of the most common causes. Your body needs iron to make hemoglobin, a substance in red blood cells that carries oxygen from the lungs to your body's cells. Without enough iron, the body produces fewer and smaller red blood cells. As a result, your body's cells do not get enough oxygen, and you feel tired and weak. And you may have trouble concentrating. Bleeding is the most common cause of a lack of iron. You may have heavy menstrual bleeding or bleeding caused by conditions such as ulcers, hemorrhoids, or cancer. Regular use of aspirin or other anti-inflammatory medicines (such as ibuprofen) also can cause bleeding in some people. A lack of iron in your diet also can cause anemia, especially at times when the body needs more iron, such as during pregnancy, infancy, and the teen years. Your doctor may have prescribed iron pills. It may take several months of treatment for your iron levels to return to normal. Your doctor also may suggest that you eat foods that are rich in iron, such as meat and beans. There are many other causes of anemia. It is not always due to a lack of iron. Finding the specific cause of your anemia will help your doctor find the right treatment for you. Follow-up care is a key part of your treatment and safety. Be sure to make and go to all appointments, and call your doctor if you are having problems. It's also a good idea to know your test results and keep a list of the medicines you take. How can you care for yourself at home? · Take your medicines exactly as prescribed. Call your doctor if you think you are having a problem with your medicine. · If your doctor recommends iron pills, take them as directed:  ? Try to take the pills on an empty stomach about 1 hour before or 2 hours after meals. But you may need to take iron with food to avoid an upset stomach. ? Do not take antacids or drink milk or caffeine drinks (such as coffee, tea, or cola) at the same time or within 2 hours of the time that you take your iron. They can make it hard for your body to absorb the iron. ? Vitamin C (from food or supplements) helps your body absorb iron. Try taking iron pills with a glass of orange juice or some other food that is high in vitamin C, such as citrus fruits. ? Iron pills may cause stomach problems, such as heartburn, nausea, diarrhea, constipation, and cramps. Be sure to drink plenty of fluids, and include fruits, vegetables, and fiber in your diet each day. Iron pills often make your bowel movements dark or green. ? If you forget to take an iron pill, do not take a double dose of iron the next time you take a pill. ? Keep iron pills out of the reach of small children. An overdose of iron can be very dangerous. · Follow your doctor's advice about eating iron-rich foods. These include red meat, shellfish, poultry, eggs, beans, raisins, whole-grain bread, and leafy green vegetables. · Steam vegetables to help them keep their iron content. When should you call for help? Call 911 anytime you think you may need emergency care. For example, call if:    · You have symptoms of a heart attack. These may include:  ? Chest pain or pressure, or a strange feeling in the chest.  ? Sweating. ? Shortness of breath. ? Nausea or vomiting. ? Pain, pressure, or a strange feeling in the back, neck, jaw, or upper belly or in one or both shoulders or arms. ? Lightheadedness or sudden weakness. ? A fast or irregular heartbeat. After you call 911, the  may tell you to chew 1 adult-strength or 2 to 4 low-dose aspirin. Wait for an ambulance.  Do not try to drive yourself.     · You passed out (lost consciousness).    Call your doctor now or seek immediate medical care if:    · You have new or increased shortness of breath.     · You are dizzy or lightheaded, or you feel like you may faint.     · Your fatigue and weakness continue or get worse.     · You have any abnormal bleeding, such as:  ? Nosebleeds. ? Vaginal bleeding that is different (heavier, more frequent, at a different time of the month) than what you are used to.  ? Bloody or black stools, or rectal bleeding. ? Bloody or pink urine.    Watch closely for changes in your health, and be sure to contact your doctor if:    · You do not get better as expected. Where can you learn more? Go to http://florin-kevin.info/. Enter R301 in the search box to learn more about \"Anemia: Care Instructions. \"  Current as of: March 28, 2019  Content Version: 12.1  © 7849-9641 [employer]. Care instructions adapted under license by [employer] (which disclaims liability or warranty for this information). If you have questions about a medical condition or this instruction, always ask your healthcare professional. [address] any warranty or liability for your use of this information.

## 2019-08-17 LAB — HGB S BLD QL SOLY: NEGATIVE

## 2019-09-27 ENCOUNTER — CLINICAL SUPPORT (OUTPATIENT)
Dept: FAMILY MEDICINE CLINIC | Age: 74
End: 2019-09-27

## 2019-09-27 VITALS
TEMPERATURE: 96.7 F | DIASTOLIC BLOOD PRESSURE: 83 MMHG | WEIGHT: 190 LBS | HEART RATE: 79 BPM | RESPIRATION RATE: 20 BRPM | OXYGEN SATURATION: 98 % | BODY MASS INDEX: 34.96 KG/M2 | HEIGHT: 62 IN | SYSTOLIC BLOOD PRESSURE: 167 MMHG

## 2019-09-27 DIAGNOSIS — Z23 ENCOUNTER FOR IMMUNIZATION: Primary | ICD-10-CM

## 2019-10-02 ENCOUNTER — OFFICE VISIT (OUTPATIENT)
Dept: FAMILY MEDICINE CLINIC | Age: 74
End: 2019-10-02

## 2019-10-02 VITALS
HEART RATE: 80 BPM | HEIGHT: 62 IN | TEMPERATURE: 97.3 F | OXYGEN SATURATION: 96 % | BODY MASS INDEX: 35.92 KG/M2 | RESPIRATION RATE: 20 BRPM | SYSTOLIC BLOOD PRESSURE: 148 MMHG | DIASTOLIC BLOOD PRESSURE: 73 MMHG | WEIGHT: 195.2 LBS

## 2019-10-02 DIAGNOSIS — J40 BRONCHITIS: Primary | ICD-10-CM

## 2019-10-02 DIAGNOSIS — R05.9 COUGH: ICD-10-CM

## 2019-10-02 DIAGNOSIS — R09.89 CHEST CONGESTION: ICD-10-CM

## 2019-10-02 DIAGNOSIS — R06.2 WHEEZING: ICD-10-CM

## 2019-10-02 RX ORDER — ALBUTEROL SULFATE 90 UG/1
2 AEROSOL, METERED RESPIRATORY (INHALATION)
Qty: 1 INHALER | Refills: 2 | Status: SHIPPED | OUTPATIENT
Start: 2019-10-02 | End: 2021-02-10

## 2019-10-02 RX ORDER — IPRATROPIUM BROMIDE AND ALBUTEROL SULFATE 2.5; .5 MG/3ML; MG/3ML
3 SOLUTION RESPIRATORY (INHALATION)
Qty: 30 NEBULE | Refills: 0
Start: 2019-10-02 | End: 2019-10-02

## 2019-10-02 RX ORDER — PREDNISONE 20 MG/1
40 TABLET ORAL
Qty: 10 TAB | Refills: 0 | Status: SHIPPED | OUTPATIENT
Start: 2019-10-02 | End: 2019-10-07

## 2019-10-02 RX ORDER — GUAIFENESIN 600 MG/1
600 TABLET, EXTENDED RELEASE ORAL 2 TIMES DAILY
Qty: 14 TAB | Refills: 0 | Status: SHIPPED | OUTPATIENT
Start: 2019-10-02 | End: 2019-10-09

## 2019-10-02 NOTE — PROGRESS NOTES
Kade Ramirez  76 y.o. female  1945  [address] [facility]  [id number]     [facility]: Progress Note       Encounter Date: 10/2/2019    Chief Complaint   Patient presents with    Cough     with wheezing     History of Present Illness   Kade Ramirez is a 76 y.o. female who presents to clinic today for:    Cough-states she has had off/on coughing for about a month. States she is wheezing and unable to clear the drainage. Denies hx of asthma. Was treated in 08/2019 with a z pack. Denies fevers, sore throat, URI symptoms. No OTC treatment. No known sick contact exposure. Health Maintenance    Health Maintenance Due   Topic Date Due    DTaP/Tdap/Td series (1 - Tdap) 07/07/1966    Shingrix Vaccine Age 50> (1 of 2) 07/07/1995    GLAUCOMA SCREENING Q2Y  07/07/2010    EYE EXAM RETINAL OR DILATED  11/12/2015    FOBT Q 1 YEAR AGE 50-75  02/24/2016    BREAST CANCER SCRN MAMMOGRAM  04/14/2016    FOOT EXAM Q1  01/24/2019    MICROALBUMIN Q1  05/24/2019    HEMOGLOBIN A1C Q6M  10/22/2019     Review of Systems   Review of Systems   Constitutional: Negative. HENT: Negative. Eyes: Negative. Respiratory: Positive for cough and wheezing. Cardiovascular: Negative. Gastrointestinal: Negative. Genitourinary: Negative. Musculoskeletal: Negative. Skin: Negative. Neurological: Negative. Endo/Heme/Allergies: Negative. Psychiatric/Behavioral: Negative. Vitals/Objective:     Vitals:    10/02/19 1015   BP: 148/73   Pulse: 80   Resp: 20   Temp: 97.3 °F (36.3 °C)   TempSrc: Oral   SpO2: 96%   Weight: 195 lb 3.2 oz (88.5 kg)   Height: 5' 2\" (1.575 m)     Body mass index is 35.7 kg/m². Physical Exam   Constitutional: She is oriented to person, place, and time. She is active and cooperative. HENT:   Nose: Nose normal.   Mouth/Throat: Uvula is midline, oropharynx is clear and moist and mucous membranes are normal. Abnormal dentition.    Eyes: Pupils are equal, round, and reactive to light. Conjunctivae and lids are normal.   Neck: Trachea normal, normal range of motion, full passive range of motion without pain and phonation normal. Neck supple. Cardiovascular: Normal rate, regular rhythm, normal heart sounds and normal pulses. Pulmonary/Chest: Effort normal. She has wheezes. Expiratory wheeze scattered in anterior and posterior lung fields. Lymphadenopathy:     She has no cervical adenopathy. Neurological: She is alert and oriented to person, place, and time. Skin: Skin is warm, dry and intact. Psychiatric: She has a normal mood and affect. Her speech is normal and behavior is normal. Judgment and thought content normal. Cognition and memory are normal.       No results found for this or any previous visit (from the past 24 hour(s)). Assessment and Plan:   1. Wheezing    - ALBUTEROL IPRATROP NON-COMP  - MA PRESSURIZED/NONPRESSURIZED INHALATION TREATMENT  - albuterol-ipratropium (DUO-NEB) 2.5 mg-0.5 mg/3 ml nebu; 3 mL by Nebulization route now for 1 dose. Dispense: 30 Nebule; Refill: 0  - predniSONE (DELTASONE) 20 mg tablet; Take 40 mg by mouth daily (with breakfast) for 5 days. Dispense: 10 Tab; Refill: 0    2. Bronchitis      3. Cough      4. Chest congestion    Patient tolerated duo neb in clinic with some improvement. Will cover with steroid, mucinex and inhaler. Patient to f/u if not better in a few days. Will repeat chest xray (08/2019-image was normal) and treat with antibiotic. I have discussed the diagnosis with the patient and the intended plan as seen in the above orders. she has expressed understanding. The patient has received an after-visit summary and questions were answered concerning future plans. I have discussed medication side effects and warnings with the patient as well. Electronically Signed: Basilio Morocho NP     History/Allergies   Patients past medical, surgical and family histories were reviewed and updated. Past Medical History:   Diagnosis Date    Hypertension     Psychotic disorder [facility])       Past Surgical History:   Procedure Laterality Date    HX GYN       No family history on file. Social History     Socioeconomic History    Marital status:      Spouse name: Not on file    Number of children: Not on file    Years of education: Not on file    Highest education level: Not on file   Occupational History    Not on file   Social Needs    Financial resource strain: Not on file    Food insecurity:     Worry: Not on file     Inability: Not on file    Transportation needs:     Medical: Not on file     Non-medical: Not on file   Tobacco Use    Smoking status: Former Smoker    Smokeless tobacco: Never Used   Substance and Sexual Activity    Alcohol use: No    Drug use: No    Sexual activity: Not on file   Lifestyle    Physical activity:     Days per week: Not on file     Minutes per session: Not on file    Stress: Not on file   Relationships    Social connections:     Talks on phone: Not on file     Gets together: Not on file     Attends [employer] service: Not on file     Active member of club or organization: Not on file     Attends meetings of clubs or organizations: Not on file     Relationship status: Not on file    Intimate partner violence:     Fear of current or ex partner: Not on file     Emotionally abused: Not on file     Physically abused: Not on file     Forced sexual activity: Not on file   Other Topics Concern    Not on file   Social History Narrative    Not on file         Allergies   Allergen Reactions    Bactrim [Sulfamethoxazole-Trimethoprim] Nausea Only       Disposition     Follow-up and Dispositions  ·   Return if symptoms worsen or fail to improve. No future appointments.          Current Medications after this visit     Current Outpatient Medications   Medication Sig    albuterol-ipratropium (DUO-NEB) 2.5 mg-0.5 mg/3 ml nebu 3 mL by Nebulization route now for 1 dose.  predniSONE (DELTASONE) 20 mg tablet Take 40 mg by mouth daily (with breakfast) for 5 days.  albuterol (PROVENTIL HFA, VENTOLIN HFA, PROAIR HFA) 90 mcg/actuation inhaler Take 2 Puffs by inhalation every six (6) hours as needed for Wheezing. Indications: bronchospasm prevention    guaiFENesin ER (MUCINEX) 600 mg ER tablet Take 1 Tab by mouth two (2) times a day for 7 days. Indications: cough    amLODIPine (NORVASC) 2.5 mg tablet Take 1 Tab by mouth daily.  diclofenac EC (VOLTAREN) 75 mg EC tablet TAKE ONE TABLET BY MOUTH TWICE DAILY AS NEEDED    mirtazapine (REMERON) 15 mg tablet     lurasidone (LATUDA) 80 mg tab tablet Take 1 Tab by mouth two (2) times a day.  lisinopril (PRINIVIL, ZESTRIL) 40 mg tablet TAKE ONE TABLET BY MOUTH EVERY DAY    OLANZapine (ZYPREXA) 10 mg tablet      No current facility-administered medications for this visit. There are no discontinued medications.

## 2019-10-02 NOTE — PROGRESS NOTES
Chief Complaint   Patient presents with    Cough     with wheezing     Visit Vitals  /73 (BP 1 Location: Left arm, BP Patient Position: Sitting)   Pulse 80   Temp 97.3 °F (36.3 °C) (Oral)   Resp 20   Ht 5' 2\" (1.575 m)   Wt 195 lb 3.2 oz (88.5 kg)   SpO2 96%   BMI 35.70 kg/m²     1. Have you been to the ER, urgent care clinic since your last visit? Hospitalized since your last visit? No    2. Have you seen or consulted any other health care providers outside of the [address] [place] [address] since your last visit? Include any pap smears or colon screening.  No    Reviewed record in preparation for visit and have necessary documentation  Pt did not bring medication to office visit for review  opportunity was given for questions  Goals that were addressed and/or need to be completed during or after this appointment include   Health Maintenance Due   Topic Date Due    DTaP/Tdap/Td series (1 - Tdap) 07/07/1966    Shingrix Vaccine Age 50> (1 of 2) 07/07/1995    GLAUCOMA SCREENING Q2Y  07/07/2010    EYE EXAM RETINAL OR DILATED  11/12/2015    FOBT Q 1 YEAR AGE 50-75  02/24/2016    BREAST CANCER SCRN MAMMOGRAM  04/14/2016    FOOT EXAM Q1  01/24/2019    MICROALBUMIN Q1  05/24/2019    HEMOGLOBIN A1C Q6M  10/22/2019

## 2019-10-02 NOTE — PATIENT INSTRUCTIONS
Bronchitis: Care Instructions Your Care Instructions Bronchitis is inflammation of the bronchial tubes, which carry air to the lungs. The tubes swell and produce mucus, or phlegm. The mucus and inflamed bronchial tubes make you cough. You may have trouble breathing. Most cases of bronchitis are caused by viruses like those that cause colds. Antibiotics usually do not help and they may be harmful. Bronchitis usually develops rapidly and lasts about 2 to 3 weeks in otherwise healthy people. Follow-up care is a key part of your treatment and safety. Be sure to make and go to all appointments, and call your doctor if you are having problems. It's also a good idea to know your test results and keep a list of the medicines you take. How can you care for yourself at home? · Take all medicines exactly as prescribed. Call your doctor if you think you are having a problem with your medicine. · Get some extra rest. 
· Take an over-the-counter pain medicine, such as acetaminophen (Tylenol), ibuprofen (Advil, Motrin), or naproxen (Aleve) to reduce fever and relieve body aches. Read and follow all instructions on the label. · Do not take two or more pain medicines at the same time unless the doctor told you to. Many pain medicines have acetaminophen, which is Tylenol. Too much acetaminophen (Tylenol) can be harmful. · Take an over-the-counter cough medicine that contains dextromethorphan to help quiet a dry, hacking cough so that you can sleep. Avoid cough medicines that have more than one active ingredient. Read and follow all instructions on the label. · Breathe moist air from a humidifier, hot shower, or sink filled with hot water. The heat and moisture will thin mucus so you can cough it out. · Do not smoke. Smoking can make bronchitis worse. If you need help quitting, talk to your doctor about stop-smoking programs and medicines. These can increase your chances of quitting for good. When should you call for help? Call 911 anytime you think you may need emergency care. For example, call if: 
  · You have severe trouble breathing.  
 Call your doctor now or seek immediate medical care if: 
  · You have new or worse trouble breathing.  
  · You cough up dark brown or bloody mucus (sputum).  
  · You have a new or higher fever.  
  · You have a new rash.  
 Watch closely for changes in your health, and be sure to contact your doctor if: 
  · You cough more deeply or more often, especially if you notice more mucus or a change in the color of your mucus.  
  · You are not getting better as expected. Where can you learn more? Go to http://florin-kevin.info/. Enter H333 in the search box to learn more about \"Bronchitis: Care Instructions. \" Current as of: June 9, 2019 Content Version: 12.2 © 2903-1415 [employer]. Care instructions adapted under license by [employer] (which disclaims liability or warranty for this information). If you have questions about a medical condition or this instruction, always ask your healthcare professional. [address] any warranty or liability for your use of this information.

## 2019-10-30 ENCOUNTER — OFFICE VISIT (OUTPATIENT)
Dept: FAMILY MEDICINE CLINIC | Age: 74
End: 2019-10-30

## 2019-10-30 VITALS
BODY MASS INDEX: 35.7 KG/M2 | HEIGHT: 62 IN | HEART RATE: 83 BPM | SYSTOLIC BLOOD PRESSURE: 168 MMHG | RESPIRATION RATE: 20 BRPM | TEMPERATURE: 98.3 F | DIASTOLIC BLOOD PRESSURE: 80 MMHG | OXYGEN SATURATION: 98 % | WEIGHT: 194 LBS

## 2019-10-30 DIAGNOSIS — G89.29 CHRONIC PAIN OF BOTH KNEES: ICD-10-CM

## 2019-10-30 DIAGNOSIS — M25.572 ACUTE LEFT ANKLE PAIN: Primary | ICD-10-CM

## 2019-10-30 DIAGNOSIS — Z00.00 MEDICARE ANNUAL WELLNESS VISIT, SUBSEQUENT: ICD-10-CM

## 2019-10-30 DIAGNOSIS — Z13.39 SCREENING FOR ALCOHOLISM: ICD-10-CM

## 2019-10-30 DIAGNOSIS — I10 ESSENTIAL HYPERTENSION: ICD-10-CM

## 2019-10-30 DIAGNOSIS — Z13.31 SCREENING FOR DEPRESSION: ICD-10-CM

## 2019-10-30 DIAGNOSIS — M25.562 CHRONIC PAIN OF BOTH KNEES: ICD-10-CM

## 2019-10-30 DIAGNOSIS — E11.9 DIABETES MELLITUS TYPE 2, DIET-CONTROLLED (HCC): ICD-10-CM

## 2019-10-30 DIAGNOSIS — M25.561 CHRONIC PAIN OF BOTH KNEES: ICD-10-CM

## 2019-10-30 LAB — HBA1C MFR BLD HPLC: 5.5 %

## 2019-10-30 RX ORDER — PREDNISONE 20 MG/1
20 TABLET ORAL 2 TIMES DAILY
Qty: 10 TAB | Refills: 0 | Status: SHIPPED | OUTPATIENT
Start: 2019-10-30 | End: 2019-11-04

## 2019-10-30 RX ORDER — LISINOPRIL 40 MG/1
40 TABLET ORAL DAILY
Qty: 90 TAB | Refills: 0 | Status: SHIPPED | OUTPATIENT
Start: 2019-10-30 | End: 2020-02-07

## 2019-10-30 RX ORDER — KETOROLAC TROMETHAMINE 30 MG/ML
30 INJECTION, SOLUTION INTRAMUSCULAR; INTRAVENOUS ONCE
Qty: 1 VIAL | Refills: 0
Start: 2019-10-30 | End: 2019-10-30

## 2019-10-30 RX ORDER — KETOROLAC TROMETHAMINE 30 MG/ML
60 INJECTION, SOLUTION INTRAMUSCULAR; INTRAVENOUS ONCE
Qty: 1 VIAL | Refills: 0
Start: 2019-10-30 | End: 2019-10-30

## 2019-10-30 NOTE — PROGRESS NOTES
1. Have you been to the ER, urgent care clinic since your last visit? Hospitalized since your last visit? No    2. Have you seen or consulted any other health care providers outside of the [address] [place] [address] since your last visit? Include any pap smears or colon screening.  No  Reviewed record in preparation for visit and have necessary documentation  Pt did not bring medication to office visit for review    Goals that were addressed and/or need to be completed during or after this appointment include   Health Maintenance Due   Topic Date Due    DTaP/Tdap/Td series (1 - Tdap) 07/07/1966    Shingrix Vaccine Age 50> (1 of 2) 07/07/1995    GLAUCOMA SCREENING Q2Y  07/07/2010    EYE EXAM RETINAL OR DILATED  11/12/2015    FOBT Q 1 YEAR AGE 50-75  02/24/2016    BREAST CANCER SCRN MAMMOGRAM  04/14/2016    FOOT EXAM Q1  01/24/2019    MICROALBUMIN Q1  05/24/2019    HEMOGLOBIN A1C Q6M  10/22/2019

## 2019-10-30 NOTE — PROGRESS NOTES
Patient: Lucina Hanna MRN: [id number]  SSN: [id number]    YOB: 1945  Age: 76 y.o. Sex: female        Subjective:     Chief Complaint   Patient presents with    Diabetes    Hypertension       HPI: she is a 76y.o. year old female new to me who presents with complaint of acute left ankle pain. Patient diagnosed with gout in this ankle 3 years ago. She has a hx of severe b/l knee OA. Patient with hx of T2D, HTN, schizophrenia, DJD and obesity. Patient denies F/C, HA, dizziness, SOB, CP, abdominal pain, dysuria, weakness or paresthesia. BP elevated. She brings in her medications, however does not have lisinopril with her. Lab Results   Component Value Date/Time    Hemoglobin A1c 5.7 (H) 04/22/2019 11:05 AM    Hemoglobin A1c (POC) 5.5 10/30/2019 03:27 PM        BP Readings from Last 3 Encounters:   10/30/19 168/80   10/02/19 148/73   09/27/19 167/83       Wt Readings from Last 3 Encounters:   10/30/19 194 lb (88 kg)   10/02/19 195 lb 3.2 oz (88.5 kg)   09/27/19 190 lb (86.2 kg)     Body mass index is 35.48 kg/m². Current and past medical information:    Current Medications after this visit[de-identified]     Current Outpatient Medications   Medication Sig    lisinopril (PRINIVIL, ZESTRIL) 40 mg tablet Take 1 Tab by mouth daily.  predniSONE (DELTASONE) 20 mg tablet Take 20 mg by mouth two (2) times a day for 5 days.  ketorolac tromethamine (TORADOL) 60 mg/2 mL soln 2 mL by IntraMUSCular route once for 1 dose.  amLODIPine (NORVASC) 2.5 mg tablet Take 1 Tab by mouth daily.  diclofenac EC (VOLTAREN) 75 mg EC tablet TAKE ONE TABLET BY MOUTH TWICE DAILY AS NEEDED    mirtazapine (REMERON) 15 mg tablet     lurasidone (LATUDA) 80 mg tab tablet Take 1 Tab by mouth two (2) times a day.  albuterol (PROVENTIL HFA, VENTOLIN HFA, PROAIR HFA) 90 mcg/actuation inhaler Take 2 Puffs by inhalation every six (6) hours as needed for Wheezing.  Indications: bronchospasm prevention    OLANZapine (ZYPREXA) 10 mg tablet      No current facility-administered medications for this visit. Patient Active Problem List    Diagnosis Date Noted    Primary osteoarthritis of left knee 09/21/2018    Valgus deformity, not elsewhere classified, left knee 09/21/2018    Type 2 diabetes with nephropathy ([facility] 06/11/2018    Severe obesity (BMI 35.0-39. 9) with comorbidity ([facility] 04/13/2018    Tardive dyskinesia 11/28/2014    Schizophrenia ([facility] 11/28/2014    Diabetes mellitus type 2, diet-controlled ([facility] 07/25/2012    HTN (hypertension) 07/09/2012    MR (mental retardation) 07/09/2012       Past Medical History:   Diagnosis Date    Hypertension     Psychotic disorder (HCC)        Allergies   Allergen Reactions    Bactrim [Sulfamethoxazole-Trimethoprim] Nausea Only       Past Surgical History:   Procedure Laterality Date    HX GYN         Social History     Socioeconomic History    Marital status:      Spouse name: Not on file    Number of children: Not on file    Years of education: Not on file    Highest education level: Not on file   Tobacco Use    Smoking status: Former Smoker    Smokeless tobacco: Never Used   Substance and Sexual Activity    Alcohol use: No    Drug use: No         Objective:     Review of Systems:  Constitutional: Negative for fatigue or malaise  Derm: Negative for rash or lesion  HEENT: Negative for acute hearing or vision changes  Cardiovascular: Negative for dizziness, chest pain or palpitations  Respiratory: Negative for cough, wheezing or SOB  Gastrointestinal: Negative for nausea or abdominal pain  Genital/urinary: Negative for dysuria or voiding dysfunction  Musculoskeletal: see HPI  Neurological: Negative for headache, weakness or paresthesia  Psychological: Negative for depression or anxiety      Vitals:    10/30/19 1431   BP: 168/80   Pulse: 83   Resp: 20   Temp: 98.3 °F (36.8 °C)   TempSrc: Oral   SpO2: 98%   Weight: 194 lb (88 kg)   Height: 5' 2\" (1.575 m)      Body mass index is 35.48 kg/m². Physical Exam:  Constitutional: well developed, well nourished, in no acute distress  Skin: left medial ankle erythematous and warm to touch  Head: normocephalic, atraumatic  Eyes: sclera clear, EOMI, PERRL  Neck: normal range of motion  Cardiovascular: normal S1, S2, regular rate and rhythm  Respiratory: clear to auscultation bilaterally with symmetrical effort  Extremities: in wheelchair, left ankle with FROM  Neurology: normal strength and sensation  Psych: active, alert and oriented, affect appropriate       Assessment and orders:       ICD-10-CM ICD-9-CM    1. Acute left ankle pain M25.572 719.47 predniSONE (DELTASONE) 20 mg tablet      [place] THER/PROPH/DIAG INJECTION, SUBCUT/IM      ketorolac tromethamine (TORADOL) 60 mg/2 mL soln      KETOROLAC TROMETHAMINE INJ      DISCONTINUED: ketorolac (TORADOL) 30 mg/mL (1 mL) injection      CANCELED: KETOROLAC TROMETHAMINE INJ   2. Chronic pain of both knees Right worse then left M25.561 719.46     M25.562 338.29     G89.29     3. Essential hypertension I10 401.9 lisinopril (PRINIVIL, ZESTRIL) 40 mg tablet   4. Diabetes mellitus type 2, diet-controlled (HCC) E11.9 250.00 HM DIABETES FOOT EXAM      AMB POC HEMOGLOBIN A1C         Plan of care:  Diagnoses were discussed in detail with patient. Medication risks/benefits/side effects discussed with patient. All of the patient's questions were addressed and answered to apparent satisfaction. The patient understands and agrees with our plan of care. The patient knows to call back if they have questions about the plan of care or if symptoms change. The patient received an After-Visit Summary which contains VS, diagnoses, orders, allergy and medication lists.       Patient Care Team:  Arabella Charlton MD (Gastroenterology)  Sheila Gage MD as Consulting Provider (Psychiatry)  Gina Oro MD (Ophthalmology)  Russell Trevino MD (Orthopedic Surgery)  Abhay Veliz MD (Cardiology)    Follow-up and Dispositions    · Return in about 2 weeks (around 11/13/2019), or if symptoms worsen or fail to improve. Future Appointments   Date Time Provider Clovis Renetta   11/13/2019  9:00 AM Jennifer Patel MD [facility] SCHED       Signed By: Cuauhtemoc Chavis MD     October 30, 2019        The following Annual Medicare Wellness Exam is distinct and separate from the medical evaluation and decision making. This is the Subsequent Medicare Annual Wellness Exam, performed 12 months or more after the Initial AWV or the last Subsequent AWV    I have reviewed the patient's medical history in detail and updated the computerized patient record. History     Patient Active Problem List   Diagnosis Code    HTN (hypertension) I5    MR (mental retardation) F79    Diabetes mellitus type 2, diet-controlled ([address] E11.9    Tardive dyskinesia G24.01    Schizophrenia ([address] F20.9    Severe obesity (BMI 35.0-39. 9) with comorbidity ([address] E66.01    Type 2 diabetes with nephropathy ([address] E11.21    Primary osteoarthritis of left knee M17.12    Valgus deformity, not elsewhere classified, left knee M21.062     Past Medical History:   Diagnosis Date    Hypertension     Psychotic disorder ([address]       Past Surgical History:   Procedure Laterality Date    HX GYN       Current Outpatient Medications   Medication Sig Dispense Refill    lisinopril (PRINIVIL, ZESTRIL) 40 mg tablet Take 1 Tab by mouth daily. 90 Tab 0    predniSONE (DELTASONE) 20 mg tablet Take 20 mg by mouth two (2) times a day for 5 days. 10 Tab 0    ketorolac tromethamine (TORADOL) 60 mg/2 mL soln 2 mL by IntraMUSCular route once for 1 dose. 1 Vial 0    amLODIPine (NORVASC) 2.5 mg tablet Take 1 Tab by mouth daily. 30 Tab 4    diclofenac EC (VOLTAREN) 75 mg EC tablet TAKE ONE TABLET BY MOUTH TWICE DAILY AS NEEDED 60 Tab 2    mirtazapine (REMERON) 15 mg tablet       lurasidone (LATUDA) 80 mg tab tablet Take 1 Tab by mouth two (2) times a day.  30 Tab 0  albuterol (PROVENTIL HFA, VENTOLIN HFA, PROAIR HFA) 90 mcg/actuation inhaler Take 2 Puffs by inhalation every six (6) hours as needed for Wheezing. Indications: bronchospasm prevention 1 Inhaler 2    OLANZapine (ZYPREXA) 10 mg tablet        Allergies   Allergen Reactions    Bactrim [Sulfamethoxazole-Trimethoprim] Nausea Only       No family history on file. Social History     Tobacco Use    Smoking status: Former Smoker    Smokeless tobacco: Never Used   Substance Use Topics    Alcohol use: No       Depression Risk Factor Screening:     3 most recent PHQ Screens 10/30/2019   PHQ Not Done -   Little interest or pleasure in doing things Not at all   Feeling down, depressed, irritable, or hopeless Not at all   Total Score PHQ 2 0       Alcohol Risk Factor Screening:   Do you average 1 drink per night or more than 7 drinks a week:  No    On any one occasion in the past three months have you have had more than 3 drinks containing alcohol:  No      Functional Ability and Level of Safety:   Hearing: Hearing is good. Activities of Daily Living: The home contains: no safety equipment. Patient does total self care    Ambulation: with difficulty, uses a cane    Fall Risk:  Fall Risk Assessment, last 12 mths 10/30/2019   Able to walk? Yes   Fall in past 12 months?  No   Fall with injury? -   Number of falls in past 12 months -   Fall Risk Score -       Abuse Screen:  Patient is not abused    Cognitive Screening   Has your family/caregiver stated any concerns about your memory: no      Patient Care Team   Patient Care Team:  Nai Stevens MD (Gastroenterology)  Sheila Gage MD as Consulting Provider (Psychiatry)  Cordell Medrano MD (Ophthalmology)  Be Dave MD (Orthopedic Surgery)  Karol Kelsey MD (Cardiology)    Assessment/Plan   Education and counseling provided:  Are appropriate based on today's review and evaluation  End-of-Life planning (with patient's consent)    Diagnoses and all orders for this visit:    1. Medicare annual wellness visit, subsequent    2.  Screening for alcoholism  -     [place] ANNUAL ALCOHOL SCREEN 15 MIN    3. Screening for depression  -     Lavell Copeland

## 2019-10-30 NOTE — PATIENT INSTRUCTIONS
Medicare Wellness Visit, Female The best way to live healthy is to have a lifestyle where you eat a well-balanced diet, exercise regularly, limit alcohol use, and quit all forms of tobacco/nicotine, if applicable. Regular preventive services are another way to keep healthy. Preventive services (vaccines, screening tests, monitoring & exams) can help personalize your care plan, which helps you manage your own care. Screening tests can find health problems at the earliest stages, when they are easiest to treat. [facility] follows the current, evidence-based guidelines published by the [facility]bot ([facility]) when recommending preventive services for our patients. Because we follow these guidelines, sometimes recommendations change over time as research supports it. (For example, mammograms used to be recommended annually. Even though Medicare will still pay for an annual mammogram, the newer guidelines recommend a mammogram every two years for women of average risk). Of course, you and your doctor may decide to screen more often for some diseases, based on your risk and your co-morbidities (chronic disease you are already diagnosed with). Preventive services for you include: - Medicare offers their members a free annual wellness visit, which is time for you and your primary care provider to discuss and plan for your preventive service needs. Take advantage of this benefit every year! 
-All adults over the age of 72 should receive the recommended pneumonia vaccines. Current USPSTF guidelines recommend a series of two vaccines for the best pneumonia protection.  
-All adults should have a flu vaccine yearly and a tetanus vaccine every 10 years.  
-All adults age 48 and older should receive the shingles vaccines (series of two vaccines). -All adults age 38-68 who are overweight should have a diabetes screening test once every three years. -All adults born between [birth year] and 1965 should be screened once for Hepatitis C. 
-Other screening tests and preventive services for persons with diabetes include: an eye exam to screen for diabetic retinopathy, a kidney function test, a foot exam, and stricter control over your cholesterol.  
-Cardiovascular screening for adults with routine risk involves an electrocardiogram (ECG) at intervals determined by your doctor.  
-Colorectal cancer screenings should be done for adults age 54-65 with no increased risk factors for colorectal cancer. There are a number of acceptable methods of screening for this type of cancer. Each test has its own benefits and drawbacks. Discuss with your doctor what is most appropriate for you during your annual wellness visit. The different tests include: colonoscopy (considered the best screening method), a fecal occult blood test, a fecal DNA test, and sigmoidoscopy. 
 
-A bone mass density test is recommended when a woman turns 65 to screen for osteoporosis. This test is only recommended one time, as a screening. Some providers will use this same test as a disease monitoring tool if you already have osteoporosis. -Breast cancer screenings are recommended every other year for women of normal risk, age 54-69. 
-Cervical cancer screenings for women over age 72 are only recommended with certain risk factors. Here is a list of your current Health Maintenance items (your personalized list of preventive services) with a due date: 
Health Maintenance Due Topic Date Due  
 DTaP/Tdap/Td  (1 - Tdap) 07/07/1966  Shingles Vaccine (1 of 2) 07/07/1995  Glaucoma Screening   07/07/2010 [facility] Eye Exam  11/12/2015  Stool testing for trace blood  02/24/2016  Mammogram  04/14/2016 [facility] Diabetic Foot Care  01/24/2019  Albumin Urine Test  05/24/2019  Hemoglobin A1C    10/22/2019

## 2019-12-19 ENCOUNTER — TELEPHONE (OUTPATIENT)
Dept: FAMILY MEDICINE CLINIC | Age: 74
End: 2019-12-19

## 2019-12-19 NOTE — TELEPHONE ENCOUNTER
Spoke with Ana who states patient is home from Rehab facility and will need PT- 2 times a week x 4 weeks, OT- 1 x a week x 2 weeks, and a 3 in 1 bedside commode. Natalie Godwin is ok for PT/OT, however, patient needs Hospital follow up so we may document diagnosis for insurance to cover DME equipment. Advised Dr. Vandana Godwin out of office until mid-January and patient may see another provider for Hospital F/U. Ana expressed understanding.

## 2019-12-19 NOTE — TELEPHONE ENCOUNTER
Pt is out of [id number]. Please call her for clarification on a number of things. Already started Home Health.

## 2019-12-23 ENCOUNTER — OFFICE VISIT (OUTPATIENT)
Dept: FAMILY MEDICINE CLINIC | Age: 74
End: 2019-12-23

## 2019-12-23 ENCOUNTER — TELEPHONE (OUTPATIENT)
Dept: FAMILY MEDICINE CLINIC | Age: 74
End: 2019-12-23

## 2019-12-23 VITALS
HEART RATE: 77 BPM | HEIGHT: 62 IN | SYSTOLIC BLOOD PRESSURE: 133 MMHG | DIASTOLIC BLOOD PRESSURE: 79 MMHG | BODY MASS INDEX: 35.48 KG/M2 | OXYGEN SATURATION: 96 % | TEMPERATURE: 98.1 F | RESPIRATION RATE: 16 BRPM

## 2019-12-23 DIAGNOSIS — S82.92XD CLOSED FRACTURE OF LEFT LOWER EXTREMITY WITH ROUTINE HEALING, SUBSEQUENT ENCOUNTER: Primary | ICD-10-CM

## 2019-12-23 DIAGNOSIS — L03.115 CELLULITIS OF RIGHT LOWER EXTREMITY: ICD-10-CM

## 2019-12-23 DIAGNOSIS — R53.81 DEBILITY: ICD-10-CM

## 2019-12-23 RX ORDER — LANOLIN ALCOHOL/MO/W.PET/CERES
1000 CREAM (GRAM) TOPICAL
COMMUNITY
Start: 2019-11-11 | End: 2021-06-09

## 2019-12-23 RX ORDER — MUPIROCIN CALCIUM 20 MG/G
CREAM TOPICAL 2 TIMES DAILY
Qty: 15 G | Refills: 0 | Status: SHIPPED | OUTPATIENT
Start: 2019-12-23

## 2019-12-23 RX ORDER — DICLOFENAC SODIUM 75 MG/1
TABLET, DELAYED RELEASE ORAL
Qty: 60 TAB | Refills: 2 | Status: SHIPPED | OUTPATIENT
Start: 2019-12-23 | End: 2021-11-01

## 2019-12-23 RX ORDER — ASPIRIN 81 MG/1
81 TABLET ORAL
COMMUNITY
Start: 2019-11-11

## 2019-12-23 NOTE — PROGRESS NOTES
1. Have you been to the ER, urgent care clinic since your last visit? Hospitalized since your last visit? Yes    2. Have you seen or consulted any other health care providers outside of the [address] [place] [address] since your last visit? Include any pap smears or colon screening.  No  Reviewed record in preparation for visit and have necessary documentation  Pt did not bring medication to office visit for review  opportunity was given for questions  Goals that were addressed and/or need to be completed during or after this appointment include    Health Maintenance Due   Topic Date Due    DTaP/Tdap/Td series (1 - Tdap) 07/07/1956    Shingrix Vaccine Age 50> (1 of 2) 07/07/1995    GLAUCOMA SCREENING Q2Y  07/07/2010    EYE EXAM RETINAL OR DILATED  11/12/2015    FOBT Q 1 YEAR AGE 50-75  02/24/2016    BREAST CANCER SCRN MAMMOGRAM  04/14/2016    MICROALBUMIN Q1  05/24/2019

## 2019-12-23 NOTE — TELEPHONE ENCOUNTER
----- Message from Dorothea Barclay sent at 12/23/2019  2:29 PM EST -----  Regarding: Dr. Jarvis Folds: [phone]  Caller's first and last name: [facility]  Reason for call: [facility] requested Dr. David Quiroz NPI for patient's orders to be forwarded to office.   Callback required yes/no and why: yes  Best contact number(s): [phone]  Details to clarify the request: n/a

## 2019-12-23 NOTE — TELEPHONE ENCOUNTER
----- Message from Reina Mas sent at 12/20/2019  6:35 PM EST -----  Regarding: Dr. Nii Tracey  General Message/Vendor Calls    Caller's first and last name:  Meli MedinaPinconning[facility]Likers    Reason for call: Callback required yes/no and why: Yes.  Clarification    Best contact number(s): [phone]    Details to clarify the request: Requesting orders for PT 2week 4, 1 week 1 OT 1 week 1, Medication Social Work, DNR discussion

## 2019-12-23 NOTE — TELEPHONE ENCOUNTER
Pt seen in office today and I advised her that I would sign the home health orders for her if [place] will send them.

## 2019-12-23 NOTE — PROGRESS NOTES
CC: Transition of care    HPI: Pt is a 76 y.o. female who presents for transition of care. No records available from hospital, will request.     Admission diagnosis: Broken left foot  Facility: [facility]hab at [facility]  Admission date: 11/11/19  Discharge date: 11/18/19, then rehab until 12/6/19  Date of contact with Nurse Navigator (please see note): N/A  Discharge records personally reviewed?: NO - reviewed partial records from [facility]. Summary of admission: She states that she had been having pain in her left leg and foot for the past year. Just prior to admission the leg gave out on her and she was unable to walk on it so she went to the ER where she had an XR that showed four breaks in the left lower extremity, two which they were told were older and two which had happened in the past month. She does not remember any inciting injury ever and denies any history of previous broken bones. She does not think they did any work-up for osteoporosis and did not add any new medications other than some vitamins. They did not take away any medications. She has an appt with Ortho on 1/22 and is supposed to be wearing a boot on her left leg until then. Prior to admission she used her 's old walker or a cane and now she is using either a wheelchair or the walker. PT has recommended she get a prescription for a walker and a bedside commode. Her 's walker is too tall for her. She would also benefit from bars in her shower but in the meantime would benefit from a shower chair. She is doing well at home but still has some occasional pain in the left lower leg and no longer has any medication for this. She had been on hydrocodone in the nursing home and on diclofenac PO prior to that. She has had increased swelling in both legs which improves with propping them.       Outstanding tests/results needing review?: None  Follow-up visits needed: Ortho  Changes in medications?: None      Past Medical History:   Diagnosis Date    Hypertension     Psychotic disorder ([facility] [address]        No family history on file. Social History     Tobacco Use    Smoking status: Former Smoker    Smokeless tobacco: Never Used   Substance Use Topics    Alcohol use: No    Drug use: No       ROS:  Per HPI    PE:  Visit Vitals  /79 (BP 1 Location: Right arm, BP Patient Position: Sitting)   Pulse 77   Temp 98.1 °F (36.7 °C) (Oral)   Resp 16   Ht 5' 2\" (1.575 m)   SpO2 96%   BMI 35.48 kg/m²     Gen: Pt sitting in chair, in NAD  Head: Normocephalic, atraumatic  Eyes: Sclera anicteric, EOM grossly intact, PERRL  Throat: MMM, normal lips, tongue and gums  Neck: Supple  CVS: Normal S1, S2, no m/r/g  Resp: CTAB, no wheezes or rales  Extrem: Left lower leg in boot. 3+ edema to upper R shin. No TTP or cords palpated in right calf and no swelling of the posterior R calf. 1cm scab on the right shin with yellow drainage. Mild erythema of the surrounding area. Pulses: 2+   Skin: Warm, dry  Neuro: Alert, oriented, appropriate      A/P: Pt is a 76 y.o. female who presents for transition of care. Will request full records. - Refill diclofenac for pain prn  - Scab on leg with some yellow drainage and mild erythema. Will do bactroban BID and pt to call if erythema or drainage increase or if she develops fever  - Pt advised to prop leg during the day and call if the swelling is not improved. Will have low threshold to get doppler if no improvement  - Will put in order to [facility] for shower chair, walker and bedside commode  - Continue home PT/OT  - RTC in 4 weeks for f/u chronic conditions, or sooner prn        Discussed diagnoses in detail with patient. Medication risks/benefits/side effects discussed with patient. All of the patient's questions were addressed. The patient understands and agrees with our plan of care.   The patient knows to call back if they are unsure of or forget any changes we discussed today or if the symptoms change. The patient received an After-Visit Summary which contains VS, orders, medication list and allergy list. This can be used as a \"mini-medical record\" should they have to seek medical care while out of town. Current Outpatient Medications on File Prior to Visit   Medication Sig Dispense Refill    aspirin delayed-release 81 mg tablet Take 81 mg by mouth.  cyanocobalamin 1,000 mcg tablet Take 1,000 mcg by mouth.  lisinopril (PRINIVIL, ZESTRIL) 40 mg tablet Take 1 Tab by mouth daily. 90 Tab 0    albuterol (PROVENTIL HFA, VENTOLIN HFA, PROAIR HFA) 90 mcg/actuation inhaler Take 2 Puffs by inhalation every six (6) hours as needed for Wheezing. Indications: bronchospasm prevention 1 Inhaler 2    amLODIPine (NORVASC) 2.5 mg tablet Take 1 Tab by mouth daily. 30 Tab 4    diclofenac EC (VOLTAREN) 75 mg EC tablet TAKE ONE TABLET BY MOUTH TWICE DAILY AS NEEDED 60 Tab 2    mirtazapine (REMERON) 15 mg tablet       OLANZapine (ZYPREXA) 10 mg tablet       lurasidone (LATUDA) 80 mg tab tablet Take 1 Tab by mouth two (2) times a day. 30 Tab 0     No current facility-administered medications on file prior to visit.

## 2019-12-23 NOTE — PATIENT INSTRUCTIONS
A Healthy Lifestyle: Care Instructions Your Care Instructions A healthy lifestyle can help you feel good, stay at a healthy weight, and have plenty of energy for both work and play. A healthy lifestyle is something you can share with your whole family. A healthy lifestyle also can lower your risk for serious health problems, such as high blood pressure, heart disease, and diabetes. You can follow a few steps listed below to improve your health and the health of your family. Follow-up care is a key part of your treatment and safety. Be sure to make and go to all appointments, and call your doctor if you are having problems. It's also a good idea to know your test results and keep a list of the medicines you take. How can you care for yourself at home? · Do not eat too much sugar, fat, or fast foods. You can still have dessert and treats now and then. The goal is moderation. · Start small to improve your eating habits. Pay attention to portion sizes, drink less juice and soda pop, and eat more fruits and vegetables. ? Eat a healthy amount of food. A 3-ounce serving of meat, for example, is about the size of a deck of cards. Fill the rest of your plate with vegetables and whole grains. ? Limit the amount of soda and sports drinks you have every day. Drink more water when you are thirsty. ? Eat at least 5 servings of fruits and vegetables every day. It may seem like a lot, but it is not hard to reach this goal. A serving or helping is 1 piece of fruit, 1 cup of vegetables, or 2 cups of leafy, raw vegetables. Have an apple or some carrot sticks as an afternoon snack instead of a candy bar. Try to have fruits and/or vegetables at every meal. 
· Make exercise part of your daily routine. You may want to start with simple activities, such as walking, bicycling, or slow swimming. Try to be active 30 to 60 minutes every day.  You do not need to do all 30 to 60 minutes all at once. For example, you can exercise 3 times a day for 10 or 20 minutes. Moderate exercise is safe for most people, but it is always a good idea to talk to your doctor before starting an exercise program. 
· Keep moving. Philip Robys the lawn, work in the garden, or [employer]. Take the stairs instead of the elevator at work. · If you smoke, quit. People who smoke have an increased risk for heart attack, stroke, cancer, and other lung illnesses. Quitting is hard, but there are ways to boost your chance of quitting tobacco for good. ? Use nicotine gum, patches, or lozenges. ? Ask your doctor about stop-smoking programs and medicines. ? Keep trying. In addition to reducing your risk of diseases in the future, you will notice some benefits soon after you stop using tobacco. If you have shortness of breath or asthma symptoms, they will likely get better within a few weeks after you quit. · Limit how much alcohol you drink. Moderate amounts of alcohol (up to 2 drinks a day for men, 1 drink a day for women) are okay. But drinking too much can lead to liver problems, high blood pressure, and other health problems. Family health If you have a family, there are many things you can do together to improve your health. · Eat meals together as a family as often as possible. · Eat healthy foods. This includes fruits, vegetables, lean meats and dairy, and whole grains. · Include your family in your fitness plan. Most people think of activities such as jogging or tennis as the way to fitness, but there are many ways you and your family can be more active. Anything that makes you breathe hard and gets your heart pumping is exercise. Here are some tips: 
? Walk to do errands or to take your child to school or the bus. 
? Go for a family bike ride after dinner instead of watching TV. Where can you learn more? Go to http://florin-kevin.info/. Enter F184 in the search box to learn more about \"A Healthy Lifestyle: Care Instructions. \" Current as of: May 28, 2019 Content Version: 12.2 © 7768-8175 [employer]. Care instructions adapted under license by [employer] (which disclaims liability or warranty for this information). If you have questions about a medical condition or this instruction, always ask your healthcare professional. [place] [address] any warranty or liability for your use of this information.

## 2019-12-24 ENCOUNTER — TELEPHONE (OUTPATIENT)
Dept: FAMILY MEDICINE CLINIC | Age: 74
End: 2019-12-24

## 2019-12-24 NOTE — TELEPHONE ENCOUNTER
----- Message from Hyacinth Patterson sent at 12/24/2019  9:47 AM EST -----  Regarding: Dr. Hardy Coggon telephone  Contact: [phone]  Caller's first and last name: [address] [facility]  Reason for call: Requested Dr. Fernie BATRES for patient's orders to be forwarded to office.   Callback required yes/no and why: yes  Best contact number(s): [phone]  Details to clarify the request: Need verbal order and information for clarification.

## 2019-12-26 NOTE — TELEPHONE ENCOUNTER
Spoke to [facility] and verified that Yadira Márquez had spoken to [place] with a verbal order for PT/OT from Dr. Nathan Bender. Also informed her that Dr. Katiana Medrano ordered the DME requessted from [facility] during patient's 12/23/19 office visit.

## 2019-12-26 NOTE — TELEPHONE ENCOUNTER
Peter [address] [place] [address]   Phone Number: [phone]             Caller's first and last name [address]   Reason for call: Callback required yes/no and why: Yes .  Requesting copy of orders  to be faxed   Best contact number(s): [phone] ( Fax)  [phone] (Phone)   Details to clarify the request: Verification of approval documents

## 2020-01-02 ENCOUNTER — TELEPHONE (OUTPATIENT)
Dept: FAMILY MEDICINE CLINIC | Age: 75
End: 2020-01-02

## 2020-01-02 NOTE — TELEPHONE ENCOUNTER
[facility]/[phone]. Because of the length of time it is taking to get the equipment. They need to extend the OT for Pt.  Please call back with a verbal.

## 2020-01-02 NOTE — TELEPHONE ENCOUNTER
Narciso Simon at [facility] and approved extending OT for patient per Dr. Anjel Moore.

## 2020-01-07 ENCOUNTER — TELEPHONE (OUTPATIENT)
Dept: FAMILY MEDICINE CLINIC | Age: 75
End: 2020-01-07

## 2020-01-07 NOTE — TELEPHONE ENCOUNTER
Pt has a spot on the bottom of her foot towards her heel. Pt has a boot on it due to fractures in her leg. It has rubbed the place on her foot. She has drainage coming thru her compression sock. We have nothing available today or tomorrow. Please call Pt. [phone].

## 2020-01-07 NOTE — TELEPHONE ENCOUNTER
----- Message from Guzman Garzon sent at 1/7/2020  4:35 PM EST -----  Regarding: Dr. Dave Merino: [phone]    Caller's first and last name: Margo Sneed ([facility])  Reason for call: Kameron Baker would like to inform Dr. Isidoro Sorto that if pt needs wound care, Dr. Isidoro Sorto must send the nurse orders to [address] [place] [address] [facility].    Callback required yes/no and why: Yes  Best contact number(s): [phone]  Fax Number: [phone]  Details to clarify the request:

## 2020-01-08 ENCOUNTER — TELEPHONE (OUTPATIENT)
Dept: FAMILY MEDICINE CLINIC | Age: 75
End: 2020-01-08

## 2020-01-08 NOTE — TELEPHONE ENCOUNTER
[facility] [address] [facility]/- Need to resend the order for Tub Bench. She does not need an order for Tub Seat but a Tub Bench because she injured her leg with the Seat. It would be safer with the Tub Bench. Requesting to hold on OT until she gets the equipment she needs.

## 2020-01-10 ENCOUNTER — OFFICE VISIT (OUTPATIENT)
Dept: FAMILY MEDICINE CLINIC | Age: 75
End: 2020-01-10

## 2020-01-10 VITALS
BODY MASS INDEX: 35.48 KG/M2 | OXYGEN SATURATION: 97 % | DIASTOLIC BLOOD PRESSURE: 73 MMHG | SYSTOLIC BLOOD PRESSURE: 143 MMHG | HEIGHT: 62 IN | HEART RATE: 95 BPM | TEMPERATURE: 98.3 F | RESPIRATION RATE: 16 BRPM

## 2020-01-10 DIAGNOSIS — I10 ESSENTIAL HYPERTENSION: Primary | ICD-10-CM

## 2020-01-10 RX ORDER — FUROSEMIDE 20 MG/1
10 TABLET ORAL DAILY
Qty: 30 TAB | Refills: 1 | Status: SHIPPED | OUTPATIENT
Start: 2020-01-10 | End: 2021-06-09

## 2020-01-10 NOTE — PROGRESS NOTES
[facility]    History of Present Illness:   Keith Herrera is a 76 y.o. female with history of HTN, DM, Schizophrenia, severe obesity, osteoarthritis, MR.  CC: HTN follow up, Possible wound on left heel  History provided by patient and Records    HPI:  Skin sore Left heel:  Patient has been in boot due to fracture over the past 2 months, PT/OT was seeing and noted wound on heel secondary to water in the heel (Trying to stop itching)    Hypertension Follow up:  Currently Taking Lisinopril 40 mg, Amlodipine 2.5 mg. The patient reports:  taking medications as instructed, no medication side effects noted, no TIA's, no chest pain on exertion, no dyspnea on exertion, no swelling of ankles. BP Readings from Last 3 Encounters:   01/10/20 143/73   12/23/19 133/79   10/30/19 168/80         Health Maintenance  Health Maintenance Due   Topic Date Due    DTaP/Tdap/Td series (1 - Tdap) 07/07/1956    Shingrix Vaccine Age 50> (1 of 2) 07/07/1995    GLAUCOMA SCREENING Q2Y  07/07/2010    EYE EXAM RETINAL OR DILATED  11/12/2015    FOBT Q 1 YEAR AGE 50-75  02/24/2016    BREAST CANCER SCRN MAMMOGRAM  04/14/2016    MICROALBUMIN Q1  05/24/2019       Past Medical, Family, and Social History:     Current Outpatient Medications on File Prior to Visit   Medication Sig Dispense Refill    aspirin delayed-release 81 mg tablet Take 81 mg by mouth.  cyanocobalamin 1,000 mcg tablet Take 1,000 mcg by mouth.  diclofenac EC (VOLTAREN) 75 mg EC tablet TAKE ONE TABLET BY MOUTH TWICE DAILY AS NEEDED 60 Tab 2    mupirocin calcium (BACTROBAN) 2 % topical cream Apply  to affected area two (2) times a day. 15 g 0    lisinopril (PRINIVIL, ZESTRIL) 40 mg tablet Take 1 Tab by mouth daily. 90 Tab 0    albuterol (PROVENTIL HFA, VENTOLIN HFA, PROAIR HFA) 90 mcg/actuation inhaler Take 2 Puffs by inhalation every six (6) hours as needed for Wheezing.  Indications: bronchospasm prevention 1 Inhaler 2    amLODIPine (NORVASC) 2.5 mg tablet Take 1 Tab by mouth daily. 30 Tab 4    mirtazapine (REMERON) 15 mg tablet       OLANZapine (ZYPREXA) 10 mg tablet       lurasidone (LATUDA) 80 mg tab tablet Take 1 Tab by mouth two (2) times a day. 30 Tab 0     No current facility-administered medications on file prior to visit. Patient Active Problem List   Diagnosis Code    HTN (hypertension) I5    MR (mental retardation) F79    Diabetes mellitus type 2, diet-controlled ([facility] [address] E11.9    Tardive dyskinesia G24.01    Schizophrenia ([facility] [address] F20.9    Severe obesity (BMI 35.0-39. 9) with comorbidity (HCC) E66.01    Type 2 diabetes with nephropathy ([facility]) E11.21    Primary osteoarthritis of left knee M17.12    Valgus deformity, not elsewhere classified, left knee M21.062       Social History     Socioeconomic History    Marital status:      Spouse name: Not on file    Number of children: Not on file    Years of education: Not on file    Highest education level: Not on file   Occupational History    Not on file   Social Needs    Financial resource strain: Not on file    Food insecurity:     Worry: Not on file     Inability: Not on file    Transportation needs:     Medical: Not on file     Non-medical: Not on file   Tobacco Use    Smoking status: Former Smoker    Smokeless tobacco: Never Used   Substance and Sexual Activity    Alcohol use: No    Drug use: No    Sexual activity: Not on file   Lifestyle    Physical activity:     Days per week: Not on file     Minutes per session: Not on file    Stress: Not on file   Relationships    Social connections:     Talks on phone: Not on file     Gets together: Not on file     Attends Taoism service: Not on file     Active member of club or organization: Not on file     Attends meetings of clubs or organizations: Not on file     Relationship status: Not on file    Intimate partner violence:     Fear of current or ex partner: Not on file     Emotionally abused: Not on file     Physically abused: Not on file     Forced sexual activity: Not on file   Other Topics Concern    Not on file   Social History Narrative    Not on file       Review of Systems   Review of Systems   Constitutional: Negative for chills and fever. Respiratory: Negative for cough. Cardiovascular: Positive for leg swelling. Negative for chest pain. Skin: Negative for rash. Objective:     Visit Vitals  /73 (BP 1 Location: Left arm, BP Patient Position: Sitting)   Pulse 95   Temp 98.3 °F (36.8 °C) (Oral)   Resp 16   Ht 5' 2\" (1.575 m)   SpO2 97%   BMI 35.48 kg/m²        Physical Exam  Vitals signs and nursing note reviewed. Constitutional:       Appearance: Normal appearance. HENT:      Head: Normocephalic and atraumatic. Neck:      Musculoskeletal: Normal range of motion and neck supple. Cardiovascular:      Rate and Rhythm: Normal rate and regular rhythm. Pulses: Normal pulses. Heart sounds: Normal heart sounds. No murmur. No friction rub. No gallop. Pulmonary:      Effort: Pulmonary effort is normal.      Breath sounds: Normal breath sounds. Abdominal:      General: Abdomen is flat. Bowel sounds are normal.      Palpations: Abdomen is soft. Musculoskeletal:      Left lower leg: Edema present. Skin:     Comments: No wound on posterior heel   Neurological:      Mental Status: She is alert. Pertinent Labs/Studies:      Assessment and orders:       ICD-10-CM ICD-9-CM    1. Essential hypertension I10 401.9 furosemide (LASIX) 20 mg tablet     Diagnoses and all orders for this visit:    1. Essential hypertension: Leg swelling, particularly on the left leg (Fracture) and with elevated BP. Will start low dose diuretic.  -     furosemide (LASIX) 20 mg tablet; Take 0.5 Tabs by mouth daily. Follow-up and Dispositions    · Return in about 2 weeks (around 1/24/2020) for Follow up BP and ankle.            I have discussed the diagnosis with the patient and the intended plan as seen in the above orders. Social history, medical history, and labs were reviewed. The patient has received an after-visit summary and questions were answered concerning future plans. I have discussed medication side effects and warnings with the patient as well.     MD JOSEFA Jang & KARSON ORDAZ [facility] [place] [facility]  01/10/20

## 2020-01-10 NOTE — PROGRESS NOTES
1. Have you been to the ER, urgent care clinic since your last visit? Hospitalized since your last visit? No    2. Have you seen or consulted any other health care providers outside of the [address] [place] [address] since your last visit? Include any pap smears or colon screening.  No  Reviewed record in preparation for visit and have necessary documentation  Pt did not bring medication to office visit for review  opportunity was given for questions  Goals that were addressed and/or need to be completed during or after this appointment include    Health Maintenance Due   Topic Date Due    DTaP/Tdap/Td series (1 - Tdap) 07/07/1956    Shingrix Vaccine Age 50> (1 of 2) 07/07/1995    GLAUCOMA SCREENING Q2Y  07/07/2010    EYE EXAM RETINAL OR DILATED  11/12/2015    FOBT Q 1 YEAR AGE 50-75  02/24/2016    BREAST CANCER SCRN MAMMOGRAM  04/14/2016    MICROALBUMIN Q1  05/24/2019

## 2020-01-16 ENCOUNTER — TELEPHONE (OUTPATIENT)
Dept: FAMILY MEDICINE CLINIC | Age: 75
End: 2020-01-16

## 2020-01-16 NOTE — TELEPHONE ENCOUNTER
[facility]/[phone]/Pt's BP is 172/92 and she has taken her BP meds today.

## 2020-01-16 NOTE — TELEPHONE ENCOUNTER
Spoke with Stepan (therapist) and told her per Dr. Samanta Morrison, to have patient's BP checked again tomorrow when relaxing.

## 2020-02-04 ENCOUNTER — TELEPHONE (OUTPATIENT)
Dept: FAMILY MEDICINE CLINIC | Age: 75
End: 2020-02-04

## 2020-02-04 NOTE — TELEPHONE ENCOUNTER
----- Message from Shanika Parish sent at 2/4/2020 10:15 AM EST -----  Regarding: Steven/Telephone  Contact: [phone]  Caller's first and last name: Danyel Pedroza. [facility]  Reason for call: Follow up  Callback required yes/no and why: Yes. To verify  Best contact number(s):[phone]  Details to clarify the request: A request for certificate of medical necessity was faxed in on 01/28/2020. [facility] is confirming item was received.

## 2020-02-05 DIAGNOSIS — I10 ESSENTIAL HYPERTENSION: ICD-10-CM

## 2020-02-07 RX ORDER — LISINOPRIL 40 MG/1
TABLET ORAL
Qty: 90 TAB | Refills: 1 | Status: SHIPPED | OUTPATIENT
Start: 2020-02-07 | End: 2020-05-28

## 2020-02-10 ENCOUNTER — TELEPHONE (OUTPATIENT)
Dept: FAMILY MEDICINE CLINIC | Age: 75
End: 2020-02-10

## 2020-02-14 ENCOUNTER — TELEPHONE (OUTPATIENT)
Dept: FAMILY MEDICINE CLINIC | Age: 75
End: 2020-02-14

## 2020-02-14 NOTE — TELEPHONE ENCOUNTER
----- Message from [facility] sent at 2/14/2020  4:45 PM EST -----  Regarding: Dr. Halima Ramos Message/Vendor Calls    Caller's first and last name: Joao Wilson    Reason for call: Franko Olivares stated that [facility] faxed over a certificate of medical   necessity for incontinent supplies on 1/28, and it was returned with missing information on 2/11. [facility] faxed the document back to be filled completely on 2/12, and checking status   to see if form has been completed.     Callback required yes/no and why: yes     Best contact number(s): [phone]    Details to clarify the request: n/a        [facility]

## 2020-02-17 NOTE — TELEPHONE ENCOUNTER
Spoke with [facility] and they state they did receive the completed fax on 2/14/20.

## 2020-03-10 ENCOUNTER — OFFICE VISIT (OUTPATIENT)
Dept: FAMILY MEDICINE CLINIC | Age: 75
End: 2020-03-10

## 2020-03-10 VITALS
RESPIRATION RATE: 18 BRPM | OXYGEN SATURATION: 97 % | WEIGHT: 192.2 LBS | HEART RATE: 86 BPM | HEIGHT: 62 IN | BODY MASS INDEX: 35.37 KG/M2 | TEMPERATURE: 97.6 F | DIASTOLIC BLOOD PRESSURE: 75 MMHG | SYSTOLIC BLOOD PRESSURE: 126 MMHG

## 2020-03-10 DIAGNOSIS — L29.9 ITCHING: ICD-10-CM

## 2020-03-10 DIAGNOSIS — E11.9 DIABETES MELLITUS TYPE 2, DIET-CONTROLLED (HCC): ICD-10-CM

## 2020-03-10 DIAGNOSIS — J06.9 VIRAL URI WITH COUGH: Primary | ICD-10-CM

## 2020-03-10 DIAGNOSIS — R60.0 LEG EDEMA: ICD-10-CM

## 2020-03-10 RX ORDER — LANOLIN ALCOHOL/MO/W.PET/CERES
500 CREAM (GRAM) TOPICAL DAILY
COMMUNITY

## 2020-03-10 RX ORDER — HYDROCORTISONE 1 %
CREAM (GRAM) TOPICAL 2 TIMES DAILY
Qty: 30 G | Refills: 0 | Status: SHIPPED | OUTPATIENT
Start: 2020-03-10

## 2020-03-10 RX ORDER — CALCIUM CARBONATE/VITAMIN D3 600MG-5MCG
TABLET ORAL DAILY
COMMUNITY
Start: 2020-01-22 | End: 2021-06-09 | Stop reason: SDUPTHER

## 2020-03-10 RX ORDER — FLUTICASONE PROPIONATE 50 MCG
2 SPRAY, SUSPENSION (ML) NASAL DAILY
Qty: 1 BOTTLE | Refills: 1 | Status: SHIPPED | OUTPATIENT
Start: 2020-03-10 | End: 2021-06-09

## 2020-03-10 NOTE — PROGRESS NOTES
Chief Complaint   Patient presents with    Cold Symptoms     runny nose, sneezing, congestion    Skin Problem     all over     Visit Vitals  /75 (BP 1 Location: Right arm, BP Patient Position: Sitting)   Pulse 86   Temp 97.6 °F (36.4 °C) (Oral)   Resp 18   Ht 5' 2\" (1.575 m)   Wt 192 lb 3.2 oz (87.2 kg)   SpO2 97%   BMI 35.15 kg/m²     1. Have you been to the ER, urgent care clinic since your last visit? Hospitalized since your last visit? No    2. Have you seen or consulted any other health care providers outside of the [address] [place] [address] since your last visit? Include any pap smears or colon screening.  No    Reviewed record in preparation for visit and have necessary documentation  Pt did not bring medication to office visit for review  opportunity was given for questions  Goals that were addressed and/or need to be completed during or after this appointment include   Health Maintenance Due   Topic Date Due    DTaP/Tdap/Td series (1 - Tdap) 07/07/1956    Shingrix Vaccine Age 50> (1 of 2) 07/07/1995    GLAUCOMA SCREENING Q2Y  07/07/2010    Eye Exam Retinal or Dilated  11/12/2015    FOBT Q1Y Age 50-75  02/24/2016    Breast Cancer Screen Mammogram  04/14/2016    MICROALBUMIN Q1  05/24/2019

## 2020-03-10 NOTE — PATIENT INSTRUCTIONS
- Nasal Saline Sprays - Flonase Spray in the nose for congestion - Eucerine Cream on Legs - Compression: Jobst Stockings, Copper Ring stockings, Knee High

## 2020-03-10 NOTE — PROGRESS NOTES
[facility]    History of Present Illness:   Blossom Ramos is a 76 y.o. female with history of HTN, Diabetes, OA, Schizophrenina  CC: URI, Itching  History provided by patient and Records    HPI:  Upper respiratory illness:  Presents with complaints of congestion, dry cough, headache, clear nasal discharge, and chills for 4 days. Reports no nausea and no vomiting. Denies sore throat, post nasal drip, productive cough, myalgias, sinus pain and fever. Symptoms are mild. Over-the-counter remedies including None. Drinking plenty of fluids: yes  History of Asthma/COPD:  no  Smoking Status: non-smoker  Sick Contacts: non known     Itching for the past 2-3 weeks and rash/dry skin on the legs for the last 2-3 days. Also noting issues with bilateral leg edema. Health Maintenance  Health Maintenance Due   Topic Date Due    DTaP/Tdap/Td series (1 - Tdap) 07/07/1956    Shingrix Vaccine Age 50> (1 of 2) 07/07/1995    GLAUCOMA SCREENING Q2Y  07/07/2010    Eye Exam Retinal or Dilated  11/12/2015    FOBT Q1Y Age 54-65  02/24/2016    Breast Cancer Screen Mammogram  04/14/2016    MICROALBUMIN Q1  05/24/2019       Past Medical, Family, and Social History:     Current Outpatient Medications on File Prior to Visit   Medication Sig Dispense Refill    calcium-vitamin D (CALCIUM 600 + D,3,) 600 mg(1,500mg) -200 unit tab Take  by mouth daily.  cyanocobalamin (VITAMIN B-12) 500 mcg tablet Take 500 mcg by mouth daily.  lisinopril (PRINIVIL, ZESTRIL) 40 mg tablet TAKE ONE TABLET BY MOUTH EVERY DAY 90 Tab 1    aspirin delayed-release 81 mg tablet Take 81 mg by mouth.  diclofenac EC (VOLTAREN) 75 mg EC tablet TAKE ONE TABLET BY MOUTH TWICE DAILY AS NEEDED 60 Tab 2    mupirocin calcium (BACTROBAN) 2 % topical cream Apply  to affected area two (2) times a day.  15 g 0    albuterol (PROVENTIL HFA, VENTOLIN HFA, PROAIR HFA) 90 mcg/actuation inhaler Take 2 Puffs by inhalation every six (6) hours as needed for Wheezing. Indications: bronchospasm prevention 1 Inhaler 2    furosemide (LASIX) 20 mg tablet Take 0.5 Tabs by mouth daily. 30 Tab 1    cyanocobalamin 1,000 mcg tablet Take 1,000 mcg by mouth.  amLODIPine (NORVASC) 2.5 mg tablet Take 1 Tab by mouth daily. 30 Tab 4    mirtazapine (REMERON) 15 mg tablet       OLANZapine (ZYPREXA) 10 mg tablet       lurasidone (LATUDA) 80 mg tab tablet Take 1 Tab by mouth two (2) times a day. 30 Tab 0     No current facility-administered medications on file prior to visit. Patient Active Problem List   Diagnosis Code    HTN (hypertension) I5    MR (mental retardation) F79    Diabetes mellitus type 2, diet-controlled ([facility] E11.9    Tardive dyskinesia G24.01    Schizophrenia ([facility] F20.9    Severe obesity (BMI 35.0-39. 9) with comorbidity (HCC) E66.01    Type 2 diabetes with nephropathy ([facility]) E11.21    Primary osteoarthritis of left knee M17.12    Valgus deformity, not elsewhere classified, left knee M21.062       Social History     Socioeconomic History    Marital status:      Spouse name: Not on file    Number of children: Not on file    Years of education: Not on file    Highest education level: Not on file   Occupational History    Not on file   Social Needs    Financial resource strain: Not on file    Food insecurity:     Worry: Not on file     Inability: Not on file    Transportation needs:     Medical: Not on file     Non-medical: Not on file   Tobacco Use    Smoking status: Former Smoker    Smokeless tobacco: Never Used   Substance and Sexual Activity    Alcohol use: No    Drug use: No    Sexual activity: Not on file   Lifestyle    Physical activity:     Days per week: Not on file     Minutes per session: Not on file    Stress: Not on file   Relationships    Social connections:     Talks on phone: Not on file     Gets together: Not on file     Attends [employer] service: Not on file     Active member of club or organization: Not on file     Attends meetings of clubs or organizations: Not on file     Relationship status: Not on file    Intimate partner violence:     Fear of current or ex partner: Not on file     Emotionally abused: Not on file     Physically abused: Not on file     Forced sexual activity: Not on file   Other Topics Concern    Not on file   Social History Narrative    Not on file       Review of Systems   Review of Systems   HENT: Positive for congestion. Respiratory: Positive for cough. Skin: Positive for itching and rash. Neurological: Positive for headaches. Objective:     Visit Vitals  /75 (BP 1 Location: Right arm, BP Patient Position: Sitting)   Pulse 86   Temp 97.6 °F (36.4 °C) (Oral)   Resp 18   Ht 5' 2\" (1.575 m)   Wt 192 lb 3.2 oz (87.2 kg)   SpO2 97%   BMI 35.15 kg/m²        Physical Exam    Pertinent Labs/Studies:      Assessment and orders:       ICD-10-CM ICD-9-CM    1. Viral URI with cough J06.9 465.9 fluticasone propionate (FLONASE) 50 mcg/actuation nasal spray    B97.89     2. Itching L29.9 698.9 hydrocortisone (CORTAID) 1 % topical cream   3. Leg edema R60.0 782. 3 Comp Stocking,Knee,Regular,Med misc   4. Diabetes mellitus type 2, diet-controlled (HCC) E11.9 250.00 LIPID PANEL      CBC W/O DIFF      METABOLIC PANEL, COMPREHENSIVE      HEMOGLOBIN A1C WITH EAG     Diagnoses and all orders for this visit:    1. Viral URI with cough: Discussed use of symptomatic therapies at this time. Advised increase fluids, use vaporizer/shower steam/saline nasal lavage 3 times daily to loosen secretions, rest, avoid smoky areas. Patient to return to office or call  if symptoms persist past 7-10 days without improvement, develops worsening fevers/symptoms, or symptoms improves then worsens over the next week. Patient was warned about pneumonia alarm symptoms and advised to call the office or come to the Emergency Room should they develop.   Written instructions were given to the patient emphasizing these recommendations. -     fluticasone propionate (FLONASE) 50 mcg/actuation nasal spray; 2 Sprays by Both Nostrils route daily. 2. Itching: Trial of topical, likely related to leg edema. -     hydrocortisone (CORTAID) 1 % topical cream; Apply  to affected area two (2) times a day. use thin layer    3. Leg edema: Patient on Lasix as needed. Will trial compression stockings  -     Comp Stocking,Knee,Regular,Med misc; Wear on legs daily. Goal Compression 20-30 mmhg. 4. Diabetes mellitus type 2, diet-controlled ([facility]): Labs  -     LIPID PANEL; Future  -     CBC W/O DIFF; Future  -     METABOLIC PANEL, COMPREHENSIVE; Future  -     HEMOGLOBIN A1C WITH EAG; Future      Follow-up and Dispositions    · Return in about 1 month (around 4/10/2020) for Chirnic conditions. I have discussed the diagnosis with the patient and the intended plan as seen in the above orders. Social history, medical history, and labs were reviewed. The patient has received an after-visit summary and questions were answered concerning future plans. I have discussed medication side effects and warnings with the patient as well.     MD JOSEFA Roberto & KARSON ORDAZ [facility]  03/10/20

## 2020-08-14 ENCOUNTER — VIRTUAL VISIT (OUTPATIENT)
Dept: FAMILY MEDICINE CLINIC | Age: 75
End: 2020-08-14
Payer: MEDICARE

## 2020-08-14 DIAGNOSIS — K59.01 SLOW TRANSIT CONSTIPATION: Primary | ICD-10-CM

## 2020-08-14 PROCEDURE — 99441 PR PHYS/QHP TELEPHONE EVALUATION 5-10 MIN: CPT | Performed by: STUDENT IN AN ORGANIZED HEALTH CARE EDUCATION/TRAINING PROGRAM

## 2020-08-14 RX ORDER — POLYETHYLENE GLYCOL 3350 17 G/17G
17 POWDER, FOR SOLUTION ORAL DAILY
Qty: 30 PACKET | Refills: 2 | Status: SHIPPED | OUTPATIENT
Start: 2020-08-14 | End: 2022-06-27 | Stop reason: SDUPTHER

## 2020-08-14 NOTE — PROGRESS NOTES
May Carrillo  76 y.o. female  1945  [address]  [id number]    [phone] (home)      [address]:    Telephone Encounter  Umair Callahan MD       Encounter Date: 8/14/2020 at 9:09 AM    Consent: May Carrillo, who was seen by synchronous (real-time) audio only technology, and/or her healthcare decision maker, is aware that this patient-initiated, Telehealth encounter on 8/14/2020 is a billable service, with coverage as determined by her insurance carrier. She is aware that she may receive a bill and has provided verbal consent to proceed: Yes. No chief complaint on file. History of Present Illness   May Carrillo is a 76 y.o. female was evaluated by telephone. I communicated with the patient and/or health care decision maker about constipation. Constipation: When pt made appt yesterday she had not had a BM in a week and was very bloated and uncomfortable. Since then she has had a BM and feels more comfortable. This constipation has been on and off for the last couple months and she would take a laxative; a white and pink pill which she does not know the name of. She denies any rectal pain or bleeding, no change in caliber of the stools or melena. Review of Systems   Review of Systems   Constitutional: Negative for fever and weight loss. HENT: Negative for congestion and sore throat. Respiratory: Negative for shortness of breath. Cardiovascular: Negative for chest pain. Gastrointestinal: Positive for constipation. Negative for abdominal pain, diarrhea, nausea and vomiting. Vitals/Objective:   General: Patient speaking in complete sentences without effort. Normal speech and cooperative. Due to this being a Virtual Check-in/Telephone evaluation, many elements of the physical examination are unable to be assessed.     Assessment and Plan:   Time-based coding, delete if not needed: I spent at least 10 minutes with this established patient, and >50% of the time was spent counseling and/or coordinating care regarding constipation  Total Time: minutes: 5-10 minutes      1. Slow transit constipation- Intermitent constipation without any red flags. Would recommend daily Miralax to help prevent constipation. May take every other day if getting diarrhea. - polyethylene glycol (MIRALAX) 17 gram packet; Take 1 Packet by mouth daily. Dispense: 30 Packet; Refill: 2        We discussed the expected course, resolution and complications of the diagnosis(es) in detail. Medication risks, benefits, costs, interactions, and alternatives were discussed as indicated. I advised her to contact the office if her condition worsens, changes or fails to improve as anticipated. She expressed understanding with the diagnosis(es) and plan. Patient understands that this encounter was a temporary measure, and the importance of further follow up and examination was emphasized. Patient verbalized understanding. Patient informed to follow up: prn    I affirm this is a Patient Initiated Episode with an Established Patient who has not had a related appointment within my department in the past 7 days or scheduled within the next 24 hours. Note: not billable if this call serves to triage the patient into an appointment for the relevant concern      Electronically Signed: Leopold Moulder, MD  Providers location when delivering service: home    CPT:  62340 (5-10 minutes)  [phone] (11-20 minutes)  21  (21-30 minutes)    Medicare:  [address]      ICD-10-CM ICD-9-CM    1.  Slow transit constipation  K59.01 564.01 polyethylene glycol (MIRALAX) 17 gram packet       Pursuant to the emergency declaration under the [facility] waiver authority and the [employer] Act, this Virtual  Visit was conducted, with patient's consent, to reduce the patient's risk of exposure to COVID-19 and provide continuity of care for an established patient. History   Patients past medical, surgical and family histories were personally reviewed and updated. Past Medical History:   Diagnosis Date    Hypertension     Psychotic disorder [facility])      Past Surgical History:   Procedure Laterality Date    HX GYN       No family history on file. Social History     Socioeconomic History    Marital status:      Spouse name: Not on file    Number of children: Not on file    Years of education: Not on file    Highest education level: Not on file   Occupational History    Not on file   Social Needs    Financial resource strain: Not on file    Food insecurity     Worry: Not on file     Inability: Not on file    Transportation needs     Medical: Not on file     Non-medical: Not on file   Tobacco Use    Smoking status: Former Smoker    Smokeless tobacco: Never Used   Substance and Sexual Activity    Alcohol use: No    Drug use: No    Sexual activity: Not on file   Lifestyle    Physical activity     Days per week: Not on file     Minutes per session: Not on file    Stress: Not on file   Relationships    Social connections     Talks on phone: Not on file     Gets together: Not on file     Attends [employer] service: Not on file     Active member of club or organization: Not on file     Attends meetings of clubs or organizations: Not on file     Relationship status: Not on file    Intimate partner violence     Fear of current or ex partner: Not on file     Emotionally abused: Not on file     Physically abused: Not on file     Forced sexual activity: Not on file   Other Topics Concern    Not on file   Social History Narrative    Not on file            Current Medications/Allergies   Medications and Allergies reviewed:    Current Outpatient Medications   Medication Sig Dispense Refill    polyethylene glycol (MIRALAX) 17 gram packet Take 1 Packet by mouth daily.  30 Packet 2  lisinopriL (PRINIVIL, ZESTRIL) 40 mg tablet TAKE ONE TABLET BY MOUTH EVERY DAY 90 Tab 0    calcium-vitamin D (CALCIUM 600 + D,3,) 600 mg(1,500mg) -200 unit tab Take  by mouth daily.  cyanocobalamin (VITAMIN B-12) 500 mcg tablet Take 500 mcg by mouth daily.  Comp Stocking,Knee,Regular,Med misc Wear on legs daily. Goal Compression 20-30 mmhg. 2 Box 2    hydrocortisone (CORTAID) 1 % topical cream Apply  to affected area two (2) times a day. use thin layer 30 g 0    fluticasone propionate (FLONASE) 50 mcg/actuation nasal spray 2 Sprays by Both Nostrils route daily. 1 Bottle 1    furosemide (LASIX) 20 mg tablet Take 0.5 Tabs by mouth daily. 30 Tab 1    aspirin delayed-release 81 mg tablet Take 81 mg by mouth.  cyanocobalamin 1,000 mcg tablet Take 1,000 mcg by mouth.  diclofenac EC (VOLTAREN) 75 mg EC tablet TAKE ONE TABLET BY MOUTH TWICE DAILY AS NEEDED 60 Tab 2    mupirocin calcium (BACTROBAN) 2 % topical cream Apply  to affected area two (2) times a day. 15 g 0    albuterol (PROVENTIL HFA, VENTOLIN HFA, PROAIR HFA) 90 mcg/actuation inhaler Take 2 Puffs by inhalation every six (6) hours as needed for Wheezing. Indications: bronchospasm prevention 1 Inhaler 2    amLODIPine (NORVASC) 2.5 mg tablet Take 1 Tab by mouth daily. 30 Tab 4    mirtazapine (REMERON) 15 mg tablet       OLANZapine (ZYPREXA) 10 mg tablet       lurasidone (LATUDA) 80 mg tab tablet Take 1 Tab by mouth two (2) times a day.  30 Tab 0     Allergies   Allergen Reactions    Bactrim [Sulfamethoxazole-Trimethoprim] Nausea Only

## 2020-08-14 NOTE — PROGRESS NOTES
[address] Medicine Residency Attending Addendum:  Dr. Dannie Garces MD,  the patient and I were not physically present during this encounter. The resident and I are concurrently monitoring the patient care through appropriate telecommunication technology. I discussed the findings, assessment and plan with the resident and agree with the resident's findings and plan as documented in the resident's note.       Merlin Singer, MD

## 2020-11-18 ENCOUNTER — VIRTUAL VISIT (OUTPATIENT)
Dept: FAMILY MEDICINE CLINIC | Age: 75
End: 2020-11-18
Payer: MEDICARE

## 2020-11-18 DIAGNOSIS — K59.00 CONSTIPATION, UNSPECIFIED CONSTIPATION TYPE: Primary | ICD-10-CM

## 2020-11-18 PROCEDURE — 99441 PR PHYS/QHP TELEPHONE EVALUATION 5-10 MIN: CPT | Performed by: FAMILY MEDICINE

## 2020-11-18 RX ORDER — DOCUSATE SODIUM 100 MG/1
100 CAPSULE, LIQUID FILLED ORAL 2 TIMES DAILY
Qty: 60 CAP | Refills: 2 | Status: SHIPPED | OUTPATIENT
Start: 2020-11-18 | End: 2021-05-17

## 2020-11-18 NOTE — PROGRESS NOTES
Kiko Tsang is a 76 y.o. female evaluated via Telephone on 20. Patient Identity confirmed by . Consent:  He and/or health care decision maker is aware that that he may receive a bill for this telephone service, depending on his insurance coverage, and has provided verbal consent to proceed: Yes    Physician Location: Office  Patient Location: Home  Nurse Assisting with Encounter: Christian Espinosa LPN    Chief Complaint   Patient presents with    Constipation      Information gathered from patient and/or health care decision maker. HPI:   Constipation:  Duration: several months  Quality: hard stool with straining, occurring less than weekly  Episode duration: month(s)  Severity:  fairly severe  Exacerbating Factors: nothing  Associated Symptoms: Endorses constipation Denies watery diarrhea, abdominal pain, nausea, vomiting, anorexia, weight loss, dehydration  Known Risk Factors: Antipsychotics  Food Factors:  Patient does admit to White rice, white rice, chocolate  Recent Sick Contacts: No   Current Medication: Miralax     Review of Systems   Constitutional: Negative for chills and fever. Cardiovascular: Negative for chest pain and palpitations. Gastrointestinal: Positive for diarrhea. Negative for abdominal pain, constipation, nausea and vomiting. Limited Exam:  Due to this being a TeleHealth evaluation, many elements of the physical examination are unable to be assessed. Constitutional: Appears well-developed and well-nourished in no apparent distress   Mental status: Alert and awake, Oriented to person/place/time, Able to follow commands  Psychiatric: Normal affect, normal judgment/insight. No hallucinations     Current Outpatient Medications on File Prior to Visit   Medication Sig Dispense Refill    polyethylene glycol (MIRALAX) 17 gram packet Take 1 Packet by mouth daily.  30 Packet 2    lisinopriL (PRINIVIL, ZESTRIL) 40 mg tablet TAKE ONE TABLET BY MOUTH EVERY DAY 90 Tab 0  calcium-vitamin D (CALCIUM 600 + D,3,) 600 mg(1,500mg) -200 unit tab Take  by mouth daily.  cyanocobalamin (VITAMIN B-12) 500 mcg tablet Take 500 mcg by mouth daily.  Comp Stocking,Knee,Regular,Med misc Wear on legs daily. Goal Compression 20-30 mmhg. 2 Box 2    hydrocortisone (CORTAID) 1 % topical cream Apply  to affected area two (2) times a day. use thin layer 30 g 0    fluticasone propionate (FLONASE) 50 mcg/actuation nasal spray 2 Sprays by Both Nostrils route daily. 1 Bottle 1    furosemide (LASIX) 20 mg tablet Take 0.5 Tabs by mouth daily. 30 Tab 1    aspirin delayed-release 81 mg tablet Take 81 mg by mouth.  cyanocobalamin 1,000 mcg tablet Take 1,000 mcg by mouth.  diclofenac EC (VOLTAREN) 75 mg EC tablet TAKE ONE TABLET BY MOUTH TWICE DAILY AS NEEDED 60 Tab 2    mupirocin calcium (BACTROBAN) 2 % topical cream Apply  to affected area two (2) times a day. 15 g 0    albuterol (PROVENTIL HFA, VENTOLIN HFA, PROAIR HFA) 90 mcg/actuation inhaler Take 2 Puffs by inhalation every six (6) hours as needed for Wheezing. Indications: bronchospasm prevention 1 Inhaler 2    amLODIPine (NORVASC) 2.5 mg tablet Take 1 Tab by mouth daily. 30 Tab 4    mirtazapine (REMERON) 15 mg tablet       OLANZapine (ZYPREXA) 10 mg tablet       lurasidone (LATUDA) 80 mg tab tablet Take 1 Tab by mouth two (2) times a day. 30 Tab 0     No current facility-administered medications on file prior to visit. Allergies   Allergen Reactions    Bactrim [Sulfamethoxazole-Trimethoprim] Nausea Only        Patient Active Problem List    Diagnosis Date Noted    Primary osteoarthritis of left knee 09/21/2018    Valgus deformity, not elsewhere classified, left knee 09/21/2018    Type 2 diabetes with nephropathy ([facility] 06/11/2018    Severe obesity (BMI 35.0-39. 9) with comorbidity ([facility] 04/13/2018    Tardive dyskinesia 11/28/2014    Sch[facility] 11/28/2014    Diabetes mellitus type 2, diet-controlled ([facility] 07/25/2012    HTN (hypertension) 07/09/2012    MR (mental retardation) 07/09/2012        Health Maintenance Due   Topic Date Due    DTaP/Tdap/Td series (1 - Tdap) 07/07/1966    Shingrix Vaccine Age 50> (1 of 2) 07/07/1995    GLAUCOMA SCREENING Q2Y  07/07/2010    Eye Exam Retinal or Dilated  11/12/2015    Colorectal Cancer Screening Combo  02/24/2016    MICROALBUMIN Q1  05/24/2019    Lipid Screen  04/22/2020    A1C test (Diabetic or Prediabetic)  04/30/2020    Flu Vaccine (1) 09/01/2020    Foot Exam Q1  10/30/2020    Medicare Yearly Exam  10/30/2020        Assessment/Plan:  Details of this discussion including any medical advice provided: Adding Colace and Metmucil and discussed dietary changes as well. ICD-10-CM ICD-9-CM    1. Constipation, unspecified constipation type  K59.00 564.00 docusate sodium (COLACE) 100 mg capsule      psyllium (METAMUCIL) packet     Follow-up and Dispositions    · Return in about 2 weeks (around 12/2/2020) for In office for chronic condition and constipation, possible MWV. Total Time: minutes: 5-10 minutes was spent on telemedicine encounter discussing above problems and plans. Patient Problem list, medications, and Allergies were reviewed during this encounter. Pursuant to the emergency declaration under the [facility] waiver authority and the [employer] Act, this Telephone Visit was conducted, with patient's consent, to reduce the patient's risk of exposure to COVID-19 and provide continuity of care for an established patient. I affirm this is a Patient Initiated Episode with an New Patient who has not had a related appointment within my department in the past 7 days or scheduled within the next 24 hours. Discussed diagnoses in detail with patient. Medication risks/benefits/side effects discussed with patient.  All of the patient's questions were addressed. The patient understands and agrees with our plan of care. The patient knows to call back if they are unsure of or forget any changes we discussed today or if the symptoms change.     Note: not billable if this call serves to triage the patient into an appointment for the relevant concern    MD JOSEFA Frankel & KARSON ORDAZ [facility]  11/18/20

## 2020-11-18 NOTE — PROGRESS NOTES
Chief Complaint   Patient presents with    Constipation     1. Have you been to the ER, urgent care clinic since your last visit? Hospitalized since your last visit? No    2. Have you seen or consulted any other health care providers outside of the [address] [place] [address] since your last visit? Include any pap smears or colon screening.  No    Reviewed record in preparation for visit and have necessary documentation  Pt did not bring medication to office visit for review  opportunity was given for questions  Goals that were addressed and/or need to be completed during or after this appointment include   Health Maintenance Due   Topic Date Due    DTaP/Tdap/Td series (1 - Tdap) 07/07/1966    Shingrix Vaccine Age 50> (1 of 2) 07/07/1995    GLAUCOMA SCREENING Q2Y  07/07/2010    Eye Exam Retinal or Dilated  11/12/2015    Colorectal Cancer Screening Combo  02/24/2016    MICROALBUMIN Q1  05/24/2019    Lipid Screen  04/22/2020    A1C test (Diabetic or Prediabetic)  04/30/2020    Flu Vaccine (1) 09/01/2020    Foot Exam Q1  10/30/2020    Medicare Yearly Exam  10/30/2020

## 2021-05-04 ENCOUNTER — VIRTUAL VISIT (OUTPATIENT)
Dept: FAMILY MEDICINE CLINIC | Age: 76
End: 2021-05-04
Payer: MEDICARE

## 2021-05-04 DIAGNOSIS — E11.9 DIABETES MELLITUS TYPE 2, DIET-CONTROLLED (HCC): ICD-10-CM

## 2021-05-04 DIAGNOSIS — I10 ESSENTIAL HYPERTENSION: ICD-10-CM

## 2021-05-04 DIAGNOSIS — R10.9 ABDOMINAL PAIN, UNSPECIFIED ABDOMINAL LOCATION: Primary | ICD-10-CM

## 2021-05-04 DIAGNOSIS — D63.8 ANEMIA, CHRONIC DISEASE: ICD-10-CM

## 2021-05-04 PROCEDURE — 99442 PR PHYS/QHP TELEPHONE EVALUATION 11-20 MIN: CPT | Performed by: STUDENT IN AN ORGANIZED HEALTH CARE EDUCATION/TRAINING PROGRAM

## 2021-05-04 NOTE — PROGRESS NOTES
Ning Rao  76 y.o. female  1945  [address]  [id number]    [phone] (home)      [address]:    Telephone Encounter  [facility]       Encounter Date: 5/4/2021    Consent:  She and/or the health care decision maker is aware that that she may receive a bill for this telephone service, depending on her insurance coverage, and has provided verbal consent to proceed: Yes    No chief complaint on file. History of Present Illness   Ning Rao is a 76 y.o. female was evaluated by telephone. 77 y/o female with pmhx diarrhea, constipation, HTN, and psychotic disorder calls complaining of abdominal pain. Abdominal pain: Patient had an episode of abdominal pain this morning. Pain was located in mid stomach, did not radiate to back, and lasted a few minutes. Has not had pain since then. She thinks it was gas pains. No diarrhea. Constipation getting better with colace and Miralax, but stool was harder today than it has been. No chest pains, no trouble breathing, diaphoresis, nausea, vomiting, hematochezia or melena. HTN: Norvasc 2.5mg  Taking BP at home? Yes--\"Top number stays under 150\"  No HA, chest pain, double vision, leg swelling. Review of Systems   See HPI    Vitals/Objective:     General: alert, cooperative, no distress   Mental  status: mental status: alert, oriented to person, place, and time, normal mood, behavior, speech, dress, motor activity, and thought processes   Resp: resp: normal effort and no respiratory distress   Neuro: neuro: no gross deficits   Skin: skin: no discoloration or lesions of concern on visible areas   Due to this being a TeleHealth evaluation, many elements of the physical examination are unable to be assessed.       Assessment and Plan:   Time-based coding, delete if not needed: I spent at least 15 minutes with this established patient, and >50% of the time was spent counseling and/or coordinating care regarding abdominal pain  Total Time: minutes: 11-20 minutes    1. Abdominal pain: Resolved. Likely 2/2 constipation vs gas pains.   -Continue Miralax and Colace daily  -Advised to call or go to ER if pain returns. 2. Essential hypertension: controlled  -Continue Norvasc 2.5 mg  - METABOLIC PANEL, COMPREHENSIVE; Future    3. Diabetes mellitus type 2, diet-controlled ([facility] [address]): diet controlled. Last A1c 5.5 1 year ago. - HEMOGLOBIN A1C WITH EAG; Future    4. Anemia, chronic disease: Last Hgb 9.9 2 years ago. Workup indicated anemia of chronic disease.   - CBC WITH AUTOMATED DIFF; Future      I affirm this is a Patient Initiated Episode with an Established Patient who has not had a related appointment within my department in the past 7 days or scheduled within the next 24 hours. Note: not billable if this call serves to triage the patient into an appointment for the relevant concern     We discussed the expected course, resolution and complications of the diagnosis(es) in detail. Medication risks, benefits, costs, interactions, and alternatives were discussed as indicated. I advised her to contact the office if her condition worsens, changes or fails to improve as anticipated. She expressed understanding with the diagnosis(es) and plan. Patient understands that this encounter was a temporary measure, and the importance of further follow up and examination was emphasized. Patient verbalized understanding. Electronically Signed: Rafi España DO    Providers location when delivering service: home    CPT:  74328 (5-10 minutes)  96234 (11-20 minutes)  21  (21-30 minutes)      ICD-10-CM ICD-9-CM    1. Abdominal pain, unspecified abdominal location  R10.9 789.00    2. Essential hypertension  L23 392.5 METABOLIC PANEL, COMPREHENSIVE   3. Diabetes mellitus type 2, diet-controlled (HCC)  E11.9 250.00 HEMOGLOBIN A1C WITH EAG   4.  Anemia, chronic disease  D63.8 285.29 CBC WITH AUTOMATED DIFF Pursuant to the emergency declaration under the [facility] waiver authority and the [employer] and [employer] Act, this Virtual  Visit was conducted, with patient's consent, to reduce the patient's risk of exposure to COVID-19 and provide continuity of care for an established patient. Services were provided through a video synchronous discussion virtually to substitute for in-person clinic visit. History   Patients past medical, surgical and family histories were personally reviewed and updated. Past Medical History:   Diagnosis Date    Hypertension     Psychotic disorder [facility])      Past Surgical History:   Procedure Laterality Date    HX GYN       No family history on file.   Social History     Socioeconomic History    Marital status:      Spouse name: Not on file    Number of children: Not on file    Years of education: Not on file    Highest education level: Not on file   Occupational History    Not on file   Social Needs    Financial resource strain: Not on file    Food insecurity     Worry: Not on file     Inability: Not on file    Transportation needs     Medical: Not on file     Non-medical: Not on file   Tobacco Use    Smoking status: Former Smoker    Smokeless tobacco: Never Used   Substance and Sexual Activity    Alcohol use: No    Drug use: No    Sexual activity: Not on file   Lifestyle    Physical activity     Days per week: Not on file     Minutes per session: Not on file    Stress: Not on file   Relationships    Social connections     Talks on phone: Not on file     Gets together: Not on file     Attends Jewish service: Not on file     Active member of club or organization: Not on file     Attends meetings of clubs or organizations: Not on file     Relationship status: Not on file    Intimate partner violence     Fear of current or ex partner: Not on file     Emotionally abused: Not on file     Physically abused: Not on file     Forced sexual activity: Not on file   Other Topics Concern    Not on file   Social History Narrative    Not on file            Current Medications/Allergies   Medications and Allergies reviewed:    Current Outpatient Medications   Medication Sig Dispense Refill    lisinopriL (PRINIVIL, ZESTRIL) 40 mg tablet TAKE ONE TABLET BY MOUTH EVERY DAY 30 Tab 0    Ventolin HFA 90 mcg/actuation inhaler INHALE 2 PUFFS BY MOUTH EVERY 6 HOURS AS NEEDED FOR WHEEZING 18 g 5    psyllium (METAMUCIL) packet Take 1 Packet by mouth daily. 30 Packet 1    polyethylene glycol (MIRALAX) 17 gram packet Take 1 Packet by mouth daily. 30 Packet 2    calcium-vitamin D (CALCIUM 600 + D,3,) 600 mg(1,500mg) -200 unit tab Take  by mouth daily.  cyanocobalamin (VITAMIN B-12) 500 mcg tablet Take 500 mcg by mouth daily.  Comp Stocking,Knee,Regular,Med misc Wear on legs daily. Goal Compression 20-30 mmhg. 2 Box 2    hydrocortisone (CORTAID) 1 % topical cream Apply  to affected area two (2) times a day. use thin layer 30 g 0    fluticasone propionate (FLONASE) 50 mcg/actuation nasal spray 2 Sprays by Both Nostrils route daily. 1 Bottle 1    furosemide (LASIX) 20 mg tablet Take 0.5 Tabs by mouth daily. 30 Tab 1    aspirin delayed-release 81 mg tablet Take 81 mg by mouth.  cyanocobalamin 1,000 mcg tablet Take 1,000 mcg by mouth.  diclofenac EC (VOLTAREN) 75 mg EC tablet TAKE ONE TABLET BY MOUTH TWICE DAILY AS NEEDED 60 Tab 2    mupirocin calcium (BACTROBAN) 2 % topical cream Apply  to affected area two (2) times a day. 15 g 0    amLODIPine (NORVASC) 2.5 mg tablet Take 1 Tab by mouth daily. 30 Tab 4    mirtazapine (REMERON) 15 mg tablet       OLANZapine (ZYPREXA) 10 mg tablet       lurasidone (LATUDA) 80 mg tab tablet Take 1 Tab by mouth two (2) times a day.  30 Tab 0     Allergies   Allergen Reactions    Bactrim [Sulfamethoxazole-Trimethoprim] Nausea Only

## 2021-05-06 NOTE — PROGRESS NOTES
[address] Medicine Residency Attending Addendum:  Dr. Chase Lowry, DO,  the patient and I were not physically present during this encounter. The resident and I are concurrently monitoring the patient care through appropriate telecommunication technology. I discussed the findings, assessment and plan with the resident and agree with the resident's findings and plan as documented in the resident's note.       Romayne Pickett, MD

## 2021-06-09 ENCOUNTER — OFFICE VISIT (OUTPATIENT)
Dept: FAMILY MEDICINE CLINIC | Age: 76
End: 2021-06-09
Payer: MEDICARE

## 2021-06-09 VITALS
TEMPERATURE: 98.1 F | HEIGHT: 62 IN | SYSTOLIC BLOOD PRESSURE: 128 MMHG | WEIGHT: 193 LBS | RESPIRATION RATE: 16 BRPM | BODY MASS INDEX: 35.51 KG/M2 | OXYGEN SATURATION: 94 % | HEART RATE: 85 BPM | DIASTOLIC BLOOD PRESSURE: 66 MMHG

## 2021-06-09 DIAGNOSIS — E11.21 TYPE 2 DIABETES WITH NEPHROPATHY (HCC): Primary | ICD-10-CM

## 2021-06-09 DIAGNOSIS — E53.8 B12 DEFICIENCY: ICD-10-CM

## 2021-06-09 DIAGNOSIS — F20.3 UNDIFFERENTIATED SCHIZOPHRENIA (HCC): ICD-10-CM

## 2021-06-09 DIAGNOSIS — E78.2 MIXED HYPERLIPIDEMIA: ICD-10-CM

## 2021-06-09 DIAGNOSIS — I10 ESSENTIAL HYPERTENSION: ICD-10-CM

## 2021-06-09 DIAGNOSIS — Z12.11 COLON CANCER SCREENING: ICD-10-CM

## 2021-06-09 DIAGNOSIS — E66.01 SEVERE OBESITY (BMI 35.0-39.9) WITH COMORBIDITY (HCC): ICD-10-CM

## 2021-06-09 PROCEDURE — 3017F COLORECTAL CA SCREEN DOC REV: CPT | Performed by: FAMILY MEDICINE

## 2021-06-09 PROCEDURE — G8536 NO DOC ELDER MAL SCRN: HCPCS | Performed by: FAMILY MEDICINE

## 2021-06-09 PROCEDURE — G8510 SCR DEP NEG, NO PLAN REQD: HCPCS | Performed by: FAMILY MEDICINE

## 2021-06-09 PROCEDURE — 3046F HEMOGLOBIN A1C LEVEL >9.0%: CPT | Performed by: FAMILY MEDICINE

## 2021-06-09 PROCEDURE — G8754 DIAS BP LESS 90: HCPCS | Performed by: FAMILY MEDICINE

## 2021-06-09 PROCEDURE — 1090F PRES/ABSN URINE INCON ASSESS: CPT | Performed by: FAMILY MEDICINE

## 2021-06-09 PROCEDURE — G8752 SYS BP LESS 140: HCPCS | Performed by: FAMILY MEDICINE

## 2021-06-09 PROCEDURE — G8400 PT W/DXA NO RESULTS DOC: HCPCS | Performed by: FAMILY MEDICINE

## 2021-06-09 PROCEDURE — G8427 DOCREV CUR MEDS BY ELIG CLIN: HCPCS | Performed by: FAMILY MEDICINE

## 2021-06-09 PROCEDURE — 1101F PT FALLS ASSESS-DOCD LE1/YR: CPT | Performed by: FAMILY MEDICINE

## 2021-06-09 PROCEDURE — 2022F DILAT RTA XM EVC RTNOPTHY: CPT | Performed by: FAMILY MEDICINE

## 2021-06-09 PROCEDURE — G8417 CALC BMI ABV UP PARAM F/U: HCPCS | Performed by: FAMILY MEDICINE

## 2021-06-09 PROCEDURE — 99214 OFFICE O/P EST MOD 30 MIN: CPT | Performed by: FAMILY MEDICINE

## 2021-06-09 RX ORDER — CALCIUM CARBONATE/VITAMIN D3 600MG-5MCG
1 TABLET ORAL DAILY
Qty: 90 TABLET | Refills: 1 | Status: SHIPPED | OUTPATIENT
Start: 2021-06-09

## 2021-06-09 NOTE — PROGRESS NOTES
1. Have you been to the ER, urgent care clinic since your last visit? Hospitalized since your last visit? No    2. Have you seen or consulted any other health care providers outside of the [address] [place] [address] since your last visit? Include any pap smears or colon screening. No    Reviewed record in preparation for visit and have necessary documentation  Goals that were addressed and/or need to be completed during or after this appointment include     Health Maintenance Due   Topic Date Due    COVID-19 Vaccine (1) Never done    DTaP/Tdap/Td series (1 - Tdap) Never done    Shingrix Vaccine Age 50> (1 of 2) Never done    Eye Exam Retinal or Dilated  11/12/2015    Colorectal Cancer Screening Combo  02/24/2016    MICROALBUMIN Q1  05/24/2019    Lipid Screen  04/22/2020    A1C test (Diabetic or Prediabetic)  04/30/2020    Medicare Yearly Exam  10/30/2020    Foot Exam Q1  10/30/2020       Patient is accompanied by self I have received verbal consent from Khang Romero to discuss any/all medical information while they are present in the room.

## 2021-06-09 NOTE — PROGRESS NOTES
[facility]    History of Present Illness:   Estephania Flynn is a 76 y.o. female with history of HTN, DM, OA, Schizophrenia, Obesity, B12 Deficincy  CC: Follow up Chronic COnditions  History provided by patient and Records    HPI:  Hypertension Follow up:  Currently Taking Lisinopril 40 mg, Amlodipine 2.5 mg. The patient reports:  taking medications as instructed, no medication side effects noted, no TIA's, no chest pain on exertion, no dyspnea on exertion, no swelling of ankles, no orthostatic dizziness or lightheadedness, no orthopnea or paroxysmal nocturnal dyspnea. BP Readings from Last 3 Encounters:   06/09/21 128/66   03/10/20 126/75   01/10/20 143/73      Diabetes Follow up: Overall the patient feels well with good energy level. Current Medications:   Key Antihyperglycemic Medications     Patient is on no antihyperglycemic meds. .   Insulin dependence: Diet Controlled Diabetes   Frequency of home glucose testing: prn   Blood Sugar range at home: N/A    Last eye exam: In past 12 months. Last foot exam: More than a year   Polyuria, polyphagia or polydipsia: NO   Retinopathy: NO   Neuropathy SX: NO   Low blood sugar symptoms: NO   Dietary compliance: compliant most of the time   Medication compliance: compliant most of the time   On ASA: YES   Tobacco Use: NO   Depression: NO     Wt Readings from Last 3 Encounters:   06/09/21 193 lb (87.5 kg)   03/10/20 192 lb 3.2 oz (87.2 kg)   10/30/19 194 lb (88 kg)     Schizophrenia: Stable on Olanzapine, Latuda, Remeron.        Health Maintenance  Health Maintenance Due   Topic Date Due    DTaP/Tdap/Td series (1 - Tdap) Never done    Shingrix Vaccine Age 50> (1 of 2) Never done    Eye Exam Retinal or Dilated  11/12/2015    Colorectal Cancer Screening Combo  02/24/2016    MICROALBUMIN Q1  05/24/2019    Lipid Screen  04/22/2020    A1C test (Diabetic or Prediabetic)  04/30/2020    Medicare Yearly Exam  10/30/2020    Foot Exam Q1 10/30/2020       Past Medical, Family, and Social History:     Current Outpatient Medications on File Prior to Visit   Medication Sig Dispense Refill    lisinopriL (PRINIVIL, ZESTRIL) 40 mg tablet TAKE ONE TABLET BY MOUTH EVERY DAY 30 Tablet 0    Metamucil Fiber Singles 3.4 gram pwpk packet MIX 1 PACKET IN LIQUID AND DRINK DAILY 30 Packet 0     mg capsule TAKE ONE CAPSULE BY MOUTH TWICE DAILY 60 Cap 0    Ventolin HFA 90 mcg/actuation inhaler INHALE 2 PUFFS BY MOUTH EVERY 6 HOURS AS NEEDED FOR WHEEZING 18 g 5    polyethylene glycol (MIRALAX) 17 gram packet Take 1 Packet by mouth daily. 30 Packet 2    calcium-vitamin D (CALCIUM 600 + D,3,) 600 mg(1,500mg) -200 unit tab Take  by mouth daily.  cyanocobalamin (VITAMIN B-12) 500 mcg tablet Take 500 mcg by mouth daily.  hydrocortisone (CORTAID) 1 % topical cream Apply  to affected area two (2) times a day. use thin layer 30 g 0    aspirin delayed-release 81 mg tablet Take 81 mg by mouth.  diclofenac EC (VOLTAREN) 75 mg EC tablet TAKE ONE TABLET BY MOUTH TWICE DAILY AS NEEDED 60 Tab 2    mupirocin calcium (BACTROBAN) 2 % topical cream Apply  to affected area two (2) times a day. 15 g 0    amLODIPine (NORVASC) 2.5 mg tablet Take 1 Tab by mouth daily. 30 Tab 4    mirtazapine (REMERON) 15 mg tablet       OLANZapine (ZYPREXA) 10 mg tablet       lurasidone (LATUDA) 80 mg tab tablet Take 1 Tab by mouth two (2) times a day. 30 Tab 0    Comp Stocking,Knee,Regular,Med misc Wear on legs daily. Goal Compression 20-30 mmhg. 2 Box 2    fluticasone propionate (FLONASE) 50 mcg/actuation nasal spray 2 Sprays by Both Nostrils route daily. (Patient not taking: Reported on 6/9/2021) 1 Bottle 1    furosemide (LASIX) 20 mg tablet Take 0.5 Tabs by mouth daily. (Patient not taking: Reported on 6/9/2021) 30 Tab 1    cyanocobalamin 1,000 mcg tablet Take 1,000 mcg by mouth.  (Patient not taking: Reported on 6/9/2021)       No current facility-administered medications on file prior to visit. Patient Active Problem List   Diagnosis Code    HTN (hypertension) I5    MR (mental retardation) F79    Diabetes mellitus type 2, diet-controlled ([facility] [address] E11.9    Tardive dyskinesia G24.01    Schizophrenia ([facility] [address] F20.9    Severe obesity (BMI 35.0-39. 9) with comorbidity ([facility] [address] E66.01    Type [facility] diabetes [facility]ropathy [facility] E11.21    Primary osteoarthritis of left knee M17.12    Valgus deformity, not elsewhere classified, left knee M21.062       Social History     Socioeconomic History    Marital status:      Spouse name: Not on file    Number of children: Not on file    Years of education: Not on file    Highest education level: Not on file   Occupational History    Not on file   Tobacco Use    Smoking status: Former Smoker    Smokeless tobacco: Never Used   Substance and Sexual Activity    Alcohol use: No    Drug use: No    Sexual activity: Not on file   Other Topics Concern    Not on file   Social History Narrative    Not on file     Social Determinants of Health     Financial Resource Strain:     Difficulty of Paying Living Expenses:    Food Insecurity:     Worried About Running Out of Food in the Last Year:     [address] in the Last Year:    Transportation Needs:     Lack of Transportation (Medical):      Lack of Transportation (Non-Medical):    Physical Activity:     Days of Exercise per Week:     Minutes of Exercise per Session:    Stress:     Feeling of Stress :    Social Connections:     Frequency of Communication with Friends and Family:     Frequency of Social Gatherings with Friends and Family:     Attends Sikhism Services:     Active Member of Clubs or Organizations:     Attends Club or Organization Meetings:     Marital Status:    Intimate Partner Violence:     Fear of Current or Ex-Partner:     Emotionally Abused:     Physically Abused:     Sexually Abused:        Review of Systems   Review of Systems Constitutional: Negative for chills and fever. Respiratory: Negative for cough. Cardiovascular: Negative for chest pain and palpitations. Gastrointestinal: Negative for nausea and vomiting. Neurological: Negative for dizziness and headaches. Objective:     Visit Vitals  /66 (BP 1 Location: Left upper arm, BP Patient Position: Sitting, BP Cuff Size: Adult)   Pulse 85   Temp 98.1 °F (36.7 °C) (Oral)   Resp 16   Ht 5' 2\" (1.575 m)   Wt 193 lb (87.5 kg)   SpO2 94%   BMI 35.30 kg/m²        Physical Exam  Vitals and nursing note reviewed. Constitutional:       Appearance: Normal appearance. HENT:      Head: Normocephalic and atraumatic. Cardiovascular:      Rate and Rhythm: Normal rate and regular rhythm. Pulses: Normal pulses. Heart sounds: Normal heart sounds. No murmur heard. No friction rub. Pulmonary:      Effort: Pulmonary effort is normal.      Breath sounds: Normal breath sounds. Abdominal:      General: Abdomen is flat. Bowel sounds are normal.      Palpations: Abdomen is soft. Musculoskeletal:         General: Normal range of motion. Cervical back: Normal range of motion and neck supple. Skin:     General: Skin is warm and dry. Neurological:      Mental Status: She is alert. Psychiatric:         Mood and Affect: Mood normal.         Behavior: Behavior normal.       Diabetic foot exam:     Left Foot:   Visual Exam: callous - Baser of great toe   Pulse DP: 1+ (weak)   Filament test: normal sensation    Vibratory sensation: normal      Right Foot:   Visual Exam: callous - Base of great toe   Pulse DP: 1+ (weak)   Filament test: normal sensation    Vibratory sensation: normal         Pertinent Labs/Studies:      Assessment and orders:       ICD-10-CM ICD-9-CM    1. Type 2 diabetes with nephropathy (HCC)  E11.21 250.40 LIPID PANEL     583.81 HEMOGLOBIN A1C WITH EAG      MICROALBUMIN, UR, RAND W/ MICROALB/CREAT RATIO   2. Severe obesity (BMI 35.0-39. 9) with comorbidity ([facility]  E66.01 278.01    3. Essential hypertension  U86 297.5 METABOLIC PANEL, COMPREHENSIVE      CBC W/O DIFF   4. Undifferentiated schizophrenia ([facility]  F20.3 295.90    5. Mixed hyperlipidemia  E78.2 272.2 LIPID PANEL   6. B12 deficiency  E53.8 266.2 VITAMIN B12   7. Colon cancer screening  Z12.11 V76.51 OCCULT BLOOD IMMUNOASSAY,DIAGNOSTIC      OCCULT BLOOD IMMUNOASSAY,DIAGNOSTIC     Diagnoses and all orders for this visit:    1. Type 2 diabetes with nephropathy [facility]): Discussed with patient today that the goal for their diabetes is to have a HgA1C<7 and ideally as close to 6.5 as possible. We discussed diet and medications. The goal for the cholesterol LDL is less than 70 and HDL>40. Patient is aware of the need for yearly eye exams and to take care of their feet daily. Discussed with patient that blood pressure should be less than 130/80 and watching salt intake is very important.    -     LIPID PANEL; Future  -     HEMOGLOBIN A1C WITH EAG; Future  -     MICROALBUMIN, UR, RAND W/ MICROALB/CREAT RATIO; Future    2. Severe obesity (BMI 35.0-39. 9) with comorbidity ([facility]    3. Essential hypertension: Our goal is to normalize the blood pressure to decrease the risks of strokes and heart attacks. The patient is in agreement with the plan. -     METABOLIC PANEL, COMPREHENSIVE; Future  -     CBC W/O DIFF; Future    4. Undifferentiated schizophrenia ([facility]): Stable    5. Mixed hyperlipidemia  -     LIPID PANEL; Future    6. B12 deficiency  -     VITAMIN B12; Future    7. Colon cancer screening  -     OCCULT BLOOD IMMUNOASSAY,DIAGNOSTIC; Future    Other orders  -     calcium-vitamin D (Calcium 600 + D,3,) 600 mg(1,500mg) -200 unit tab; Take 1 Tablet by mouth daily. Follow-up and Dispositions    · Return in about 3 months (around 9/9/2021) for MWE. I have discussed the diagnosis with the patient and the intended plan as seen in the above orders.   Social history, medical history, and labs were reviewed. The patient has received an after-visit summary and questions were answered concerning future plans. I have discussed medication side effects and warnings with the patient as well.     MD JOSEFA Mann & KARSON ORDAZ [facility]  06/09/21

## 2021-06-12 LAB
ALBUMIN SERPL-MCNC: 3.9 G/DL (ref 3.5–5)
ALBUMIN/GLOB SERPL: 1 {RATIO} (ref 1.1–2.2)
ALP SERPL-CCNC: 69 U/L (ref 45–117)
ALT SERPL-CCNC: 16 U/L (ref 12–78)
ANION GAP SERPL CALC-SCNC: 6 MMOL/L (ref 5–15)
AST SERPL-CCNC: 11 U/L (ref 15–37)
BILIRUB SERPL-MCNC: 0.2 MG/DL (ref 0.2–1)
BUN SERPL-MCNC: 14 MG/DL (ref 6–20)
BUN/CREAT SERPL: 17 (ref 12–20)
CALCIUM SERPL-MCNC: 9.4 MG/DL (ref 8.5–10.1)
CHLORIDE SERPL-SCNC: 106 MMOL/L (ref 97–108)
CHOLEST SERPL-MCNC: 172 MG/DL
CO2 SERPL-SCNC: 25 MMOL/L (ref 21–32)
CREAT SERPL-MCNC: 0.83 MG/DL (ref 0.55–1.02)
CREAT UR-MCNC: 17.5 MG/DL
GLOBULIN SER CALC-MCNC: 3.8 G/DL (ref 2–4)
GLUCOSE SERPL-MCNC: 100 MG/DL (ref 65–100)
HDLC SERPL-MCNC: 72 MG/DL
HDLC SERPL: 2.4 {RATIO} (ref 0–5)
LDLC SERPL CALC-MCNC: 89.2 MG/DL (ref 0–100)
MICROALBUMIN UR-MCNC: 3.32 MG/DL
MICROALBUMIN/CREAT UR-RTO: 190 MG/G (ref 0–30)
POTASSIUM SERPL-SCNC: 4 MMOL/L (ref 3.5–5.1)
PROT SERPL-MCNC: 7.7 G/DL (ref 6.4–8.2)
SODIUM SERPL-SCNC: 137 MMOL/L (ref 136–145)
TRIGL SERPL-MCNC: 54 MG/DL (ref ?–150)
VIT B12 SERPL-MCNC: >2000 PG/ML (ref 193–986)
VLDLC SERPL CALC-MCNC: 10.8 MG/DL

## 2021-11-01 DIAGNOSIS — S82.92XD CLOSED FRACTURE OF LEFT LOWER EXTREMITY WITH ROUTINE HEALING, SUBSEQUENT ENCOUNTER: ICD-10-CM

## 2021-11-01 RX ORDER — DICLOFENAC SODIUM 75 MG/1
TABLET, DELAYED RELEASE ORAL
Qty: 60 TABLET | Refills: 0 | Status: SHIPPED | OUTPATIENT
Start: 2021-11-01

## 2021-11-05 ENCOUNTER — NURSE TRIAGE (OUTPATIENT)
Dept: OTHER | Facility: CLINIC | Age: 76
End: 2021-11-05

## 2021-11-05 ENCOUNTER — TELEPHONE (OUTPATIENT)
Dept: FAMILY MEDICINE CLINIC | Age: 76
End: 2021-11-05

## 2021-11-05 NOTE — TELEPHONE ENCOUNTER
Received call from [address] [facility] at [facility] with Red Flag Complaint. Brief description of triage: wheezing for one week,  Nose is running, coughing    Triage indicates for patient to the ED    Care advice provided, patient verbalizes understanding; denies any other questions or concerns; instructed to call back for any new or worsening symptoms. Attention Provider: Thank you for allowing me to participate in the care of your patient. The patient was connected to triage in response to information provided to the ECC. Please do not respond through this encounter as the response is not directed to a shared pool. Reason for Disposition   Wheezing can be heard across the room    Answer Assessment - Initial Assessment Questions  1. RESPIRATORY STATUS: \"Describe your breathing? \" (e.g., wheezing, shortness of breath, unable to speak, severe coughing)       Wheezing, cough     2. ONSET: \"When did this breathing problem begin? \"       One week ago     3. PATTERN \"Does the difficult breathing come and go, or has it been constant since it started? \"       Constant     4. SEVERITY: \"How bad is your breathing? \" (e.g., mild, moderate, severe)     - MILD: No SOB at rest, mild SOB with walking, speaks normally in sentences, can lay down, no retractions, pulse < 100.     - MODERATE: SOB at rest, SOB with minimal exertion and prefers to sit, cannot lie down flat, speaks in phrases, mild retractions, audible wheezing, pulse 100-120.     - SEVERE: Very SOB at rest, speaks in single words, struggling to breathe, sitting hunched forward, retractions, pulse > 120       Mild     5. RECURRENT SYMPTOM: \"Have you had difficulty breathing before? \" If so, ask: \"When was the last time? \" and \"What happened that time? \"       Denies history of wheezing     6. CARDIAC HISTORY: \"Do you have any history of heart disease? \" (e.g., heart attack, angina, bypass surgery, angioplasty)       Denies     7.  LUNG HISTORY: \"Do you have any history of lung disease? \"  (e.g., pulmonary embolus, asthma, emphysema)      Denies     8. CAUSE: \"What do you think is causing the breathing problem? \"       Does not know     9. OTHER SYMPTOMS: \"Do you have any other symptoms? (e.g., dizziness, runny nose, cough, chest pain, fever)      Cough, runny nose, sounds sob     10. PREGNANCY: \"Is there any chance you are pregnant? \" \"When was your last menstrual period? \"        N/a    11. TRAVEL: \"Have you traveled out of the country in the last month? \" (e.g., travel history, exposures)        Denies    Protocols used: BREATHING DIFFICULTY-ADULT-OH

## 2021-11-05 NOTE — TELEPHONE ENCOUNTER
----- Message from Colby Wing sent at 11/5/2021  1:01 PM EDT -----  Subject: Appointment Request    Reason for Call: Urgent Cough Cold    QUESTIONS  Type of Appointment? Established Patient  Reason for appointment request? No appointments available during search  Additional Information for Provider? Steve Palma states she has been wheezing   and coughing for about a week. No trouble breathing though, no fever or   any other covid symptoms. Wants to know if she can be seen on Monday 11/8/21. Please call to schedule. I was unable to get through to the   office after multiple attempts.   ---------------------------------------------------------------------------  --------------  [address]  What is the best way for the office to contact you? OK to leave message on   voicemail  Preferred Call Back Phone Number? [phone]  ---------------------------------------------------------------------------  --------------  SCRIPT ANSWERS  Relationship to Patient? Self  Are you currently unable to finish sentences due to any difficulty   breathing? No  Are you unable to swallow liquids? No  Are you having fevers (100.4 or greater), chills, or sweats? No  Do you have COPD, asthma or a chronic lung condition? No  Have your symptoms been present for more than 5 days? Yes   Have you been diagnosed with, awaiting test results for, or told that you   are suspected of having COVID-19 (Coronavirus)? (If patient has tested   negative or was tested as a requirement for work, school, or travel and   not based on symptoms, answer no)? No  Within the past two weeks have you developed any of the following symptoms   (answer no if symptoms have been present longer than 2 weeks or began   more than 2 weeks ago)?  Fever or Chills, Cough, Shortness of breath or   difficulty breathing, Loss of taste or smell, Sore throat, Nasal   congestion, Sneezing or runny nose, Fatigue or generalized body aches   (answer no if pain is specific to a body part e.g. back pain), Diarrhea,   Headache?  Yes

## 2021-11-05 NOTE — TELEPHONE ENCOUNTER
Call made to pt, no answer, mess left.      Please schedule for 4pm on Monday with Dr. Cameron Carlton

## 2021-11-22 ENCOUNTER — TELEPHONE (OUTPATIENT)
Dept: FAMILY MEDICINE CLINIC | Age: 76
End: 2021-11-22

## 2021-11-22 DIAGNOSIS — I10 ESSENTIAL HYPERTENSION: Primary | ICD-10-CM

## 2021-11-22 RX ORDER — ALBUTEROL SULFATE 0.83 MG/ML
2.5 SOLUTION RESPIRATORY (INHALATION)
Qty: 75 ML | Refills: 0 | Status: SHIPPED | OUTPATIENT
Start: 2021-11-22 | End: 2022-09-12

## 2021-11-22 RX ORDER — LISINOPRIL 40 MG/1
40 TABLET ORAL DAILY
Qty: 30 TABLET | Refills: 0 | Status: SHIPPED | OUTPATIENT
Start: 2021-11-22 | End: 2022-05-04

## 2021-11-22 NOTE — TELEPHONE ENCOUNTER
Need to refill on Albuterol Sul 1.25 mg/3  QTY: 75ml  Use 1 vial in nebulizer every 4 hours as needed for wheezing

## 2021-11-24 ENCOUNTER — OFFICE VISIT (OUTPATIENT)
Dept: FAMILY MEDICINE CLINIC | Age: 76
End: 2021-11-24
Payer: MEDICARE

## 2021-11-24 VITALS
HEART RATE: 89 BPM | RESPIRATION RATE: 24 BRPM | WEIGHT: 195 LBS | HEIGHT: 62 IN | TEMPERATURE: 97.9 F | DIASTOLIC BLOOD PRESSURE: 75 MMHG | SYSTOLIC BLOOD PRESSURE: 115 MMHG | BODY MASS INDEX: 35.88 KG/M2 | OXYGEN SATURATION: 99 %

## 2021-11-24 DIAGNOSIS — J45.21 MILD INTERMITTENT ASTHMA WITH EXACERBATION: Primary | ICD-10-CM

## 2021-11-24 DIAGNOSIS — E11.9 DIABETES MELLITUS TYPE 2, DIET-CONTROLLED (HCC): ICD-10-CM

## 2021-11-24 DIAGNOSIS — Z23 ENCOUNTER FOR IMMUNIZATION: ICD-10-CM

## 2021-11-24 DIAGNOSIS — I10 PRIMARY HYPERTENSION: ICD-10-CM

## 2021-11-24 DIAGNOSIS — E11.21 TYPE 2 DIABETES WITH NEPHROPATHY (HCC): ICD-10-CM

## 2021-11-24 LAB
ALBUMIN SERPL-MCNC: 3.6 G/DL (ref 3.5–5)
ALBUMIN/GLOB SERPL: 1.1 {RATIO} (ref 1.1–2.2)
ALP SERPL-CCNC: 52 U/L (ref 45–117)
ALT SERPL-CCNC: 18 U/L (ref 12–78)
ANION GAP SERPL CALC-SCNC: 5 MMOL/L (ref 5–15)
AST SERPL-CCNC: 7 U/L (ref 15–37)
BILIRUB SERPL-MCNC: 0.3 MG/DL (ref 0.2–1)
BUN SERPL-MCNC: 17 MG/DL (ref 6–20)
BUN/CREAT SERPL: 17 (ref 12–20)
CALCIUM SERPL-MCNC: 9.1 MG/DL (ref 8.5–10.1)
CHLORIDE SERPL-SCNC: 102 MMOL/L (ref 97–108)
CHOLEST SERPL-MCNC: 177 MG/DL
CO2 SERPL-SCNC: 27 MMOL/L (ref 21–32)
CREAT SERPL-MCNC: 1.02 MG/DL (ref 0.55–1.02)
ERYTHROCYTE [DISTWIDTH] IN BLOOD BY AUTOMATED COUNT: 16.1 % (ref 11.5–14.5)
EST. AVERAGE GLUCOSE BLD GHB EST-MCNC: 131 MG/DL
GLOBULIN SER CALC-MCNC: 3.3 G/DL (ref 2–4)
GLUCOSE SERPL-MCNC: 118 MG/DL (ref 65–100)
HBA1C MFR BLD: 6.2 % (ref 4–5.6)
HCT VFR BLD AUTO: 27.8 % (ref 35–47)
HDLC SERPL-MCNC: 60 MG/DL
HDLC SERPL: 3 {RATIO} (ref 0–5)
HGB BLD-MCNC: 8.8 G/DL (ref 11.5–16)
LDLC SERPL CALC-MCNC: 101 MG/DL (ref 0–100)
MCH RBC QN AUTO: 30.6 PG (ref 26–34)
MCHC RBC AUTO-ENTMCNC: 31.7 G/DL (ref 30–36.5)
MCV RBC AUTO: 96.5 FL (ref 80–99)
NRBC # BLD: 0 K/UL (ref 0–0.01)
NRBC BLD-RTO: 0 PER 100 WBC
PLATELET # BLD AUTO: 298 K/UL (ref 150–400)
PMV BLD AUTO: 9.2 FL (ref 8.9–12.9)
POTASSIUM SERPL-SCNC: 4.2 MMOL/L (ref 3.5–5.1)
PROT SERPL-MCNC: 6.9 G/DL (ref 6.4–8.2)
RBC # BLD AUTO: 2.88 M/UL (ref 3.8–5.2)
SODIUM SERPL-SCNC: 134 MMOL/L (ref 136–145)
TRIGL SERPL-MCNC: 80 MG/DL (ref ?–150)
VLDLC SERPL CALC-MCNC: 16 MG/DL
WBC # BLD AUTO: 10.1 K/UL (ref 3.6–11)

## 2021-11-24 PROCEDURE — 99214 OFFICE O/P EST MOD 30 MIN: CPT | Performed by: FAMILY MEDICINE

## 2021-11-24 PROCEDURE — G8432 DEP SCR NOT DOC, RNG: HCPCS | Performed by: FAMILY MEDICINE

## 2021-11-24 PROCEDURE — G8427 DOCREV CUR MEDS BY ELIG CLIN: HCPCS | Performed by: FAMILY MEDICINE

## 2021-11-24 PROCEDURE — G8752 SYS BP LESS 140: HCPCS | Performed by: FAMILY MEDICINE

## 2021-11-24 PROCEDURE — G0008 ADMIN INFLUENZA VIRUS VAC: HCPCS | Performed by: FAMILY MEDICINE

## 2021-11-24 PROCEDURE — 1090F PRES/ABSN URINE INCON ASSESS: CPT | Performed by: FAMILY MEDICINE

## 2021-11-24 PROCEDURE — 90694 VACC AIIV4 NO PRSRV 0.5ML IM: CPT | Performed by: FAMILY MEDICINE

## 2021-11-24 PROCEDURE — G8417 CALC BMI ABV UP PARAM F/U: HCPCS | Performed by: FAMILY MEDICINE

## 2021-11-24 PROCEDURE — G8754 DIAS BP LESS 90: HCPCS | Performed by: FAMILY MEDICINE

## 2021-11-24 PROCEDURE — G8400 PT W/DXA NO RESULTS DOC: HCPCS | Performed by: FAMILY MEDICINE

## 2021-11-24 PROCEDURE — G8536 NO DOC ELDER MAL SCRN: HCPCS | Performed by: FAMILY MEDICINE

## 2021-11-24 PROCEDURE — 1101F PT FALLS ASSESS-DOCD LE1/YR: CPT | Performed by: FAMILY MEDICINE

## 2021-11-24 RX ORDER — PREDNISONE 20 MG/1
40 TABLET ORAL
Qty: 10 TABLET | Refills: 0 | Status: SHIPPED | OUTPATIENT
Start: 2021-11-24 | End: 2021-11-29

## 2021-11-24 NOTE — PROGRESS NOTES
[facility]    History of Present Illness:   Libia Calvo is a 68 y.o. female with history of  HTN, DM, OA, Schizophrenia, Obesity, B12 Deficincy  CC: ER follow up  History provided by patient and Records    HPI:  Patient reports that she has had with cough, chest congestion, wheezing since the weather change earlier this month. Did go to the ER recently and treated with Albuterol, Coricidin, and Prednisone. Has noted some improvement but not resolved. Has been using Inhaler and nebulizer machine together. Is interested in more Prednisone. Diabetes Follow up: Overall the patient feels well with good energy level. Current Medications:   Key Antihyperglycemic Medications     Patient is on no antihyperglycemic meds. .   Insulin dependence: YES   Frequency of home glucose testing: prn   Blood Sugar range at home: N/A    Last eye exam: In past 12 months. Last foot exam: This year.    Polyuria, polyphagia or polydipsia: NO   Retinopathy: NO   Neuropathy SX: NO   Low blood sugar symptoms: NO   Dietary compliance: compliant most of the time   Medication compliance: compliant most of the time   On ASA: NO   Tobacco Use: NO   Depression: NO     Wt Readings from Last 3 Encounters:   11/24/21 195 lb (88.5 kg)   06/09/21 193 lb (87.5 kg)   03/10/20 192 lb 3.2 oz (87.2 kg)     Lab Results   Component Value Date/Time    Hemoglobin A1c 5.7 (H) 04/22/2019 11:05 AM    Hemoglobin A1c (POC) 5.5 10/30/2019 03:27 PM      Lab Results   Component Value Date/Time    Microalbumin/Creat ratio (mg/g creat) 190 (H) 06/11/2021 09:15 AM    Microalbumin,urine random 3.32 06/11/2021 09:15 AM       Lab Results   Component Value Date/Time    Creatinine 0.83 06/11/2021 09:15 AM          Health Maintenance  Health Maintenance Due   Topic Date Due    DTaP/Tdap/Td series (1 - Tdap) Never done    Shingrix Vaccine Age 50> (1 of 2) Never done    Eye Exam Retinal or Dilated  11/12/2015    A1C test (Diabetic or Prediabetic)  04/30/2020    Medicare Yearly Exam  10/30/2020    Flu Vaccine (1) 09/01/2021    COVID-19 Vaccine (3 - Booster for Elizabeth Morales series) 09/05/2021       Past Medical, Family, and Social History:     Current Outpatient Medications on File Prior to Visit   Medication Sig Dispense Refill    lisinopriL (PRINIVIL, ZESTRIL) 40 mg tablet Take 1 Tablet by mouth daily. APPT REQUIRED FOR REFILLS 30 Tablet 0    albuterol (PROVENTIL VENTOLIN) 2.5 mg /3 mL (0.083 %) nebu 3 mL by Nebulization route every four (4) hours as needed for Wheezing. 75 mL 0    diclofenac EC (VOLTAREN) 75 mg EC tablet TAKE ONE TABLET BY MOUTH TWICE DAILY AS NEEDED 60 Tablet 0    docusate sodium (COLACE) 100 mg capsule TAKE ONE CAPSULE BY MOUTH TWICE DAILY 180 Capsule 1    Metamucil, with sugar, 3.4 gram pwpk MIX 1 PACKET IN LIQUID AND DRINK DAILY 30 Packet 5    calcium-vitamin D (Calcium 600 + D,3,) 600 mg(1,500mg) -200 unit tab Take 1 Tablet by mouth daily. 90 Tablet 1    Metamucil Fiber Singles 3.4 gram pwpk packet MIX 1 PACKET IN LIQUID AND DRINK DAILY 30 Packet 0    Ventolin HFA 90 mcg/actuation inhaler INHALE 2 PUFFS BY MOUTH EVERY 6 HOURS AS NEEDED FOR WHEEZING 18 g 5    polyethylene glycol (MIRALAX) 17 gram packet Take 1 Packet by mouth daily. 30 Packet 2    cyanocobalamin (VITAMIN B-12) 500 mcg tablet Take 500 mcg by mouth daily.  hydrocortisone (CORTAID) 1 % topical cream Apply  to affected area two (2) times a day. use thin layer 30 g 0    aspirin delayed-release 81 mg tablet Take 81 mg by mouth.  mupirocin calcium (BACTROBAN) 2 % topical cream Apply  to affected area two (2) times a day. 15 g 0    amLODIPine (NORVASC) 2.5 mg tablet Take 1 Tab by mouth daily. 30 Tab 4    mirtazapine (REMERON) 15 mg tablet       OLANZapine (ZYPREXA) 10 mg tablet       lurasidone (LATUDA) 80 mg tab tablet Take 1 Tab by mouth two (2) times a day. 30 Tab 0     No current facility-administered medications on file prior to visit. Patient Active Problem List   Diagnosis Code    HTN (hypertension) I5    MR (mental retardation) F79    Diabetes mellitus type 2, diet-controlled ([facility] E11.9    Tardive dyskinesia G24.01    Schizophrenia ([facility] [facility] F20.9    Severe obesity (BMI 35.0-39. 9) with comorbidity (HCC) E66.01    Type 2 diabetes with nephropathy (HCC) E11.21    Primary osteoarthritis of left knee M17.12    Valgus deformity, not elsewhere classified, left knee M21.062    B12 deficiency E53.8       Social History     Socioeconomic History    Marital status:      Spouse name: Not on file    Number of children: Not on file    Years of education: Not on file    Highest education level: Not on file   Occupational History    Not on file   Tobacco Use    Smoking status: Former Smoker    Smokeless tobacco: Never Used   Substance and Sexual Activity    Alcohol use: No    Drug use: No    Sexual activity: Not on file   Other Topics Concern    Not on file   Social History Narrative    Not on file     Social Determinants of Health     Financial Resource Strain:     Difficulty of Paying Living Expenses: Not on file   Food Insecurity:     Worried About Running Out of Food in the Last Year: Not on file    Bernabe of Food in the Last Year: Not on file   Transportation Needs:     Lack of Transportation (Medical): Not on file    Lack of Transportation (Non-Medical):  Not on file   Physical Activity:     Days of Exercise per Week: Not on file    Minutes of Exercise per Session: Not on file   Stress:     Feeling of Stress : Not on file   Social Connections:     Frequency of Communication with Friends and Family: Not on file    Frequency of Social Gatherings with Friends and Family: Not on file    Attends Christianity Services: Not on file    Active Member of Clubs or Organizations: Not on file    Attends Club or Organization Meetings: Not on file    Marital Status: Not on file   Intimate Partner Violence:     Fear of Current or Ex-Partner: Not on file    Emotionally Abused: Not on file    Physically Abused: Not on file    Sexually Abused: Not on file   Housing Stability:     Unable to Pay for Housing in the Last Year: Not on file    Number of Places Lived in the Last Year: Not on file    Unstable Housing in the Last Year: Not on file       Review of Systems   Review of Systems   Constitutional: Negative for chills and fever. Respiratory: Positive for cough and wheezing. Gastrointestinal: Negative for nausea and vomiting. Objective:     Visit Vitals  /75 (BP 1 Location: Right arm, BP Patient Position: Sitting, BP Cuff Size: Adult)   Pulse 89   Temp 97.9 °F (36.6 °C) (Oral)   Resp 24   Ht 5' 2\" (1.575 m)   Wt 195 lb (88.5 kg)   SpO2 99%   BMI 35.67 kg/m²        Physical Exam  Vitals and nursing note reviewed. Constitutional:       Appearance: Normal appearance. HENT:      Head: Normocephalic and atraumatic. Cardiovascular:      Rate and Rhythm: Normal rate and regular rhythm. Pulses: Normal pulses. Heart sounds: Normal heart sounds. No murmur heard. No friction rub. No gallop. Pulmonary:      Effort: Pulmonary effort is normal.      Breath sounds: Wheezing present. Abdominal:      General: Abdomen is flat. Bowel sounds are normal.      Palpations: Abdomen is soft. Musculoskeletal:      Cervical back: Normal range of motion and neck supple. Skin:     General: Skin is warm and dry. Neurological:      Mental Status: She is alert. Pertinent Labs/Studies:      Assessment and orders:       ICD-10-CM ICD-9-CM    1. Mild intermittent asthma with exacerbation  J45.21 493.92 predniSONE (DELTASONE) 20 mg tablet   2. Type 2 diabetes with nephropathy (HCC)  E11.21 250.40      583.81    3. Primary hypertension  I10 401.9 CBC W/O DIFF      METABOLIC PANEL, COMPREHENSIVE   4.  Diabetes mellitus type 2, diet-controlled (HCC)  E11.9 250.00 LIPID PANEL      HEMOGLOBIN A1C WITH EAG Diagnoses and all orders for this visit:    1. Mild intermittent asthma with exacerbation: onitnuing course of Prednisone  -     predniSONE (DELTASONE) 20 mg tablet; Take 40 mg by mouth daily (with breakfast) for 5 days. 2. Type 2 diabetes with nephropathy ([facility] [address]    3. Primary hypertension: Our goal is to normalize the blood pressure to decrease the risks of strokes and heart attacks. The patient is in agreement with the plan. -     CBC W/O DIFF; Future  -     METABOLIC PANEL, COMPREHENSIVE; Future    4. Diabetes mellitus type 2, diet-controlled ([facility] [address]): Discussed with patient today that the goal for their diabetes is to have a HgA1C<7 and ideally as close to 6.5 as possible. We discussed diet and medications. The goal for the cholesterol LDL is less than 70 and HDL>40. Patient is aware of the need for yearly eye exams and to take care of their feet daily. Discussed with patient that blood pressure should be less than 130/80 and watching salt intake is very important.    -     LIPID PANEL; Future  -     HEMOGLOBIN A1C WITH EAG; Future      Follow-up and Dispositions    · Return in about 4 weeks (around 12/22/2021) for MWE. I have discussed the diagnosis with the patient and the intended plan as seen in the above orders. Social history, medical history, and labs were reviewed. The patient has received an after-visit summary and questions were answered concerning future plans. I have discussed medication side effects and warnings with the patient as well.     MD JOSEFA Foley & KARSON ORDAZ [facility]  11/24/21

## 2021-11-24 NOTE — PROGRESS NOTES
1. Have you been to the ER, urgent care clinic since your last visit? Hospitalized since your last visit? Yes [facility] for asthma     2. Have you seen or consulted any other health care providers outside of the [address] [place] [address] since your last visit? Include any pap smears or colon screening. No    Reviewed record in preparation for visit and have necessary documentation  Goals that were addressed and/or need to be completed during or after this appointment include     Health Maintenance Due   Topic Date Due    DTaP/Tdap/Td series (1 - Tdap) Never done    Shingrix Vaccine Age 50> (1 of 2) Never done    Eye Exam Retinal or Dilated  11/12/2015    A1C test (Diabetic or Prediabetic)  04/30/2020    Medicare Yearly Exam  10/30/2020    Flu Vaccine (1) 09/01/2021    COVID-19 Vaccine (3 - Booster for Henri Roch series) 09/05/2021       Patient is accompanied by self I have received verbal consent from Oscar Sky to discuss any/all medical information while they are present in the room.

## 2021-12-27 ENCOUNTER — OFFICE VISIT (OUTPATIENT)
Dept: FAMILY MEDICINE CLINIC | Age: 76
End: 2021-12-27
Payer: MEDICARE

## 2021-12-27 VITALS
RESPIRATION RATE: 14 BRPM | OXYGEN SATURATION: 96 % | HEART RATE: 86 BPM | BODY MASS INDEX: 34.6 KG/M2 | TEMPERATURE: 98.7 F | DIASTOLIC BLOOD PRESSURE: 82 MMHG | HEIGHT: 62 IN | SYSTOLIC BLOOD PRESSURE: 129 MMHG | WEIGHT: 188 LBS

## 2021-12-27 DIAGNOSIS — Z00.00 MEDICARE ANNUAL WELLNESS VISIT, SUBSEQUENT: Primary | ICD-10-CM

## 2021-12-27 DIAGNOSIS — Z71.89 ADVANCED DIRECTIVES, COUNSELING/DISCUSSION: ICD-10-CM

## 2021-12-27 PROCEDURE — G8400 PT W/DXA NO RESULTS DOC: HCPCS | Performed by: FAMILY MEDICINE

## 2021-12-27 PROCEDURE — G8536 NO DOC ELDER MAL SCRN: HCPCS | Performed by: FAMILY MEDICINE

## 2021-12-27 PROCEDURE — G8427 DOCREV CUR MEDS BY ELIG CLIN: HCPCS | Performed by: FAMILY MEDICINE

## 2021-12-27 PROCEDURE — G8432 DEP SCR NOT DOC, RNG: HCPCS | Performed by: FAMILY MEDICINE

## 2021-12-27 PROCEDURE — G8752 SYS BP LESS 140: HCPCS | Performed by: FAMILY MEDICINE

## 2021-12-27 PROCEDURE — G0439 PPPS, SUBSEQ VISIT: HCPCS | Performed by: FAMILY MEDICINE

## 2021-12-27 PROCEDURE — G8417 CALC BMI ABV UP PARAM F/U: HCPCS | Performed by: FAMILY MEDICINE

## 2021-12-27 PROCEDURE — 1101F PT FALLS ASSESS-DOCD LE1/YR: CPT | Performed by: FAMILY MEDICINE

## 2021-12-27 PROCEDURE — G8754 DIAS BP LESS 90: HCPCS | Performed by: FAMILY MEDICINE

## 2021-12-27 NOTE — PROGRESS NOTES
This is the Subsequent Medicare Annual Wellness Exam, performed 12 months or more after the Initial AWV or the last Subsequent AWV    I have reviewed the patient's medical history in detail and updated the computerized patient record. History     Well Woman exam:  Pat Denton is a 68 y.o. female presenting for well woman exam.     How would you rate your health in generally over the last year? Good  Has your physical and emotional health limited your social activities with family or friends? no    Current Complaints? Asthma better (See attached Problem visit note if applicable)    Menstrual/Sexual History:  Age at which menses began: 15 y.o. Vaginal Bleeding: None    PAP History: Normal    [id number]  Sexually active: No    Risk factors for breast cancer: Low risk    Diet/Exercise History:  Diet: Favorite food [employer]. - Does patient eat at least 5 servings of Fruits/vegetables daily? Yes  - Is patient currently dieting? No    Exercise: Patient Does have structured exercise  - Does patient get 150 minutes of structured exercise weekly? No  - Type of work patient has? retired  - Limitations to exercise? None    Healthcare maintenance:   Health Maintenance Due   Topic Date Due    DTaP/Tdap/Td series (1 - Tdap) Never done    Shingrix Vaccine Age 50> (1 of 2) Never done    Eye Exam Retinal or Dilated  11/12/2015    COVID-19 Vaccine (3 - Booster for Samantha Barrs series) 09/05/2021     Mammogram indicated? No   Colonoscopy indicated? No   DEXA scan indicated? No   HIV/STI testing indicated? No   Hepatitis C testing indicated? No   Lung cancer screening indicated? No   AAA screening indicated?   No     Immunization History   Administered Date(s) Administered    COVID-19, Moderna, Primary or Immunocompromised Series, MRNA, PF, 100mcg/0.5mL 02/05/2021, 03/05/2021    Influenza High Dose Vaccine PF 10/02/2017    Influenza Vaccine (Quad) PF (>6 Mo Flulaval, Fluarix, and >3 Yrs [address] [place] [address] Street, Fluzone [id number]) 09/25/2014, 02/09/2016, 10/13/2016    Influenza Vaccine (Tri) Adjuvanted (>65 Yrs FLUAD TRI [id number]) 09/21/2018, 09/27/2019    Influenza Vaccine Split 10/16/2012    Influenza, Quadrivalent, Adjuvanted (>65 Yrs FLUAD QUAD [id number]) 11/24/2021    Pneumococcal Conjugate (PCV-13) 10/24/2017    Pneumococcal Polysaccharide (PPSV-23) 10/22/2013     Flu indicated? No   Tdap indicated? Yes  Pneumovax indicated? No   Zostervax indicated? Yes  Meningococcal indicated? No         Past Medical History:   Diagnosis Date    Hypertension     Psychotic disorder ([facility]       Past Surgical History:   Procedure Laterality Date    HX GYN       Allergies   Allergen Reactions    Bactrim [Sulfamethoxazole-Trimethoprim] Nausea Only     No family history on file. Social History     Tobacco Use    Smoking status: Former Smoker    Smokeless tobacco: Never Used   Substance Use Topics    Alcohol use: No     Depression Risk Factor Screening:     3 most recent PHQ Screens 12/27/2021   PHQ Not Done -   Little interest or pleasure in doing things Not at all   Feeling down, depressed, irritable, or hopeless Not at all   Total Score PHQ 2 0     Alcohol Risk Factor Screening:    Do you average more than 1 drink per night or more than 7 drinks a week: No    In the past three months have you have had more than 4 drinks containing alcohol on one occasion: No  Functional Ability and Level of Safety:   Hearing Loss  Hearing is good. Activities of Daily Living  The home contains: no safety equipment.     ADL Assessment 12/27/2021   Feeding yourself No Help Needed   Getting from bed to chair No Help Needed   Getting dressed No Help Needed   Bathing or showering No Help Needed   Walk across the room (includes cane/walker) No Help Needed   Using the telphone No Help Needed   Taking your medications No Help Needed   Preparing meals No Help Needed   Managing money (expenses/bills) No Help Needed   Moderately strenuous housework (laundry) No Help Needed Shopping for personal items (toiletries/medicines) No Help Needed   Shopping for groceries No Help Needed   Driving Help Needed   Climbing a flight of stairs No Help Needed   Getting to places beyond walking distances Help Needed       Ambulation: with no difficulty    Fall Risk  Fall Risk Assessment, last 12 mths 2021   Able to walk? Yes   Fall in past 12 months? 0   Do you feel unsteady? 0   Are you worried about falling 0   Number of falls in past 12 months -   Fall with injury? -       Abuse Screen  Abuse Screening Questionnaire 2021   Do you ever feel afraid of your partner? N   Are you in a relationship with someone who physically or mentally threatens you? N   Is it safe for you to go home?  Y     Cognitive Screening   Evaluation of Cognitive Function:  Has your family/caregiver stated any concerns about your memory: no  Abnormal, MMSE  Mini Mental State Exam 2021   What is the Year 1   What is the Season 1   What is the Date 1   What is the Day 1   What is the Month 1   Where are we State 1   Where are we Country 1   Where are we [place] or [place] 1   Where are we Floor 1   Name three objects, then ask the patient to say them 3   Serial sevens Subtract 7 from 100 in increments 1   Ask for the three objects repeated above 3   Name a pencil 1   Name a watch 1   Have the patient repeat this phrase \"No ifs, ands, or buts\" 1   Three stage command: Take the paper in your right hand 0   Fold the paper in half 1   Put the paper on the floor 1   Read and obey the followin [address] 1   Have the patient write a sentence 1   Have the patient copy a figure 1   Mini Mental Score 25   Some recent data might be hidden     Patient Care Team   Patient Care Team:  Danna Anaya MD as PCP - General (Family Medicine)  Danna Anaya MD as PCP - [facility] Empaneled Provider  Cristobal Sanchez MD (Gastroenterology)  Sheila Gage MD as Consulting Provider (Psychiatry)  Maureen Thompson MD (Ophthalmology)  Nolan Sheth MD (Orthopedic Surgery)  Alex Hernandez MD (Cardiology)  Assessment/Plan   Education and counseling provided:  Are appropriate based on today's review and evaluation  End-of-Life planning (with patient's consent)    Diagnoses and all orders for this visit:    1. Medicare annual wellness visit, subsequent    2. Advanced directives, counseling/discussion  -     DO NOT RESUSCITATE        Health Maintenance Topics with due status: Overdue       Topic Date Due    DTaP/Tdap/Td series Never done    Shingrix Vaccine Age 49> Never done    Eye Exam Retinal or Dilated 11/12/2015    COVID-19 Vaccine 09/05/2021     Health Maintenance Topics with due status: Not Due       Topic Last Completion Date    Foot Exam Q1 06/09/2021    MICROALBUMIN Q1 06/11/2021    A1C test (Diabetic or Prediabetic) 11/24/2021    Lipid Screen 11/24/2021    Medicare Yearly Exam 12/27/2021     Health Maintenance Topics with due status: Completed       Topic Last Completion Date    Hepatitis C Screening 08/18/2016    Pneumococcal 65+ years 10/24/2017    Flu Vaccine 11/24/2021     Health Maintenance Topics with due status:  Addressed       Topic Date Due    Bone Densitometry (Dexa) Screening Addressed

## 2021-12-27 NOTE — ACP (ADVANCE CARE PLANNING)
Advance Care Planning     Advance Care Planning (ACP) Physician/NP/PA Conversation      Date of Conversation: 12/27/2021  Conducted with: Patient with Decision Making Capacity    Healthcare Decision Maker:   No healthcare decision makers have been documented. Click here to complete [address] including selection of the Healthcare Decision Maker Relationship (ie \"Primary\")      Today we documented Decision Maker(s). The patient will provide ACP documents. Care Preferences:    Hospitalization: \"If your health worsens and it becomes clear that your chance of recovery is unlikely, what would be your preference regarding hospitalization? \"  The patient would prefer hospitalization. Resuscitation: \"In the event your heart stopped as a result of an underlying serious health condition, would you want attempts to be made to restart your heart, or would you prefer a natural death? \"   No, do NOT attempt to resuscitate.     Additional topics discussed: Decision makers    Conversation Outcomes / Follow-Up Plan:   ACP in process - information provided, considering goals and options  Reviewed DNR/DNI and patient confirms current DNR status - completed forms on file (place new order if needed)     Length of Voluntary ACP Conversation in minutes:  <16 minutes (Non-Billable)    Andrew Love MD

## 2021-12-27 NOTE — PROGRESS NOTES
1. Have you been to the ER, urgent care clinic since your last visit? Hospitalized since your last visit? No    2. Have you seen or consulted any other health care providers outside of the [address] [place] [address] since your last visit? Include any pap smears or colon screening. No    Reviewed record in preparation for visit and have necessary documentation  Pt did not bring medication to office visit for review  Patient is accompanied by self I have received verbal consent from Rocla Riding to discuss any/all medical information while they are present in the room.     Goals that were addressed and/or need to be completed during or after this appointment include     Health Maintenance Due   Topic Date Due    DTaP/Tdap/Td series (1 - Tdap) Never done    Shingrix Vaccine Age 50> (1 of 2) Never done    Eye Exam Retinal or Dilated  11/12/2015    Medicare Yearly Exam  10/30/2020    COVID-19 Vaccine (3 - Booster for Eri Hammed series) 09/05/2021

## 2021-12-27 NOTE — PATIENT INSTRUCTIONS

## 2022-02-08 DIAGNOSIS — S82.92XD CLOSED FRACTURE OF LEFT LOWER EXTREMITY WITH ROUTINE HEALING, SUBSEQUENT ENCOUNTER: ICD-10-CM

## 2022-02-08 RX ORDER — DICLOFENAC SODIUM 75 MG/1
TABLET, DELAYED RELEASE ORAL
Qty: 60 TABLET | Refills: 0 | OUTPATIENT
Start: 2022-02-08

## 2022-02-08 NOTE — TELEPHONE ENCOUNTER
This is a high risk medication in a patient with severe anemia. Refill will have to be addressed at next OV.   Thank you,  Dr. Call Breeding

## 2022-02-10 ENCOUNTER — TELEPHONE (OUTPATIENT)
Dept: FAMILY MEDICINE CLINIC | Age: 77
End: 2022-02-10

## 2022-02-11 ENCOUNTER — OFFICE VISIT (OUTPATIENT)
Dept: FAMILY MEDICINE CLINIC | Age: 77
End: 2022-02-11
Payer: MEDICARE

## 2022-02-11 VITALS
HEIGHT: 62 IN | RESPIRATION RATE: 16 BRPM | OXYGEN SATURATION: 98 % | SYSTOLIC BLOOD PRESSURE: 153 MMHG | WEIGHT: 188 LBS | BODY MASS INDEX: 34.6 KG/M2 | TEMPERATURE: 98.3 F | DIASTOLIC BLOOD PRESSURE: 81 MMHG | HEART RATE: 82 BPM

## 2022-02-11 DIAGNOSIS — F20.3 UNDIFFERENTIATED SCHIZOPHRENIA (HCC): ICD-10-CM

## 2022-02-11 DIAGNOSIS — E11.9 DIABETES MELLITUS TYPE 2, DIET-CONTROLLED (HCC): ICD-10-CM

## 2022-02-11 DIAGNOSIS — E11.22 TYPE 2 DIABETES MELLITUS WITH CHRONIC KIDNEY DISEASE, WITHOUT LONG-TERM CURRENT USE OF INSULIN, UNSPECIFIED CKD STAGE (HCC): ICD-10-CM

## 2022-02-11 DIAGNOSIS — I10 PRIMARY HYPERTENSION: ICD-10-CM

## 2022-02-11 DIAGNOSIS — E11.21 TYPE 2 DIABETES WITH NEPHROPATHY (HCC): ICD-10-CM

## 2022-02-11 DIAGNOSIS — M17.11 PRIMARY OSTEOARTHRITIS OF RIGHT KNEE: Primary | ICD-10-CM

## 2022-02-11 PROCEDURE — G8536 NO DOC ELDER MAL SCRN: HCPCS | Performed by: FAMILY MEDICINE

## 2022-02-11 PROCEDURE — G8753 SYS BP > OR = 140: HCPCS | Performed by: FAMILY MEDICINE

## 2022-02-11 PROCEDURE — 1090F PRES/ABSN URINE INCON ASSESS: CPT | Performed by: FAMILY MEDICINE

## 2022-02-11 PROCEDURE — G8427 DOCREV CUR MEDS BY ELIG CLIN: HCPCS | Performed by: FAMILY MEDICINE

## 2022-02-11 PROCEDURE — 1101F PT FALLS ASSESS-DOCD LE1/YR: CPT | Performed by: FAMILY MEDICINE

## 2022-02-11 PROCEDURE — G8510 SCR DEP NEG, NO PLAN REQD: HCPCS | Performed by: FAMILY MEDICINE

## 2022-02-11 PROCEDURE — G8400 PT W/DXA NO RESULTS DOC: HCPCS | Performed by: FAMILY MEDICINE

## 2022-02-11 PROCEDURE — 99214 OFFICE O/P EST MOD 30 MIN: CPT | Performed by: FAMILY MEDICINE

## 2022-02-11 PROCEDURE — G8417 CALC BMI ABV UP PARAM F/U: HCPCS | Performed by: FAMILY MEDICINE

## 2022-02-11 PROCEDURE — G8754 DIAS BP LESS 90: HCPCS | Performed by: FAMILY MEDICINE

## 2022-02-11 RX ORDER — DICLOFENAC SODIUM 10 MG/G
1 GEL TOPICAL 4 TIMES DAILY
Qty: 150 G | Refills: 1 | Status: SHIPPED | OUTPATIENT
Start: 2022-02-11

## 2022-02-11 RX ORDER — PREDNISONE 20 MG/1
40 TABLET ORAL
Qty: 10 TABLET | Refills: 0 | Status: SHIPPED | OUTPATIENT
Start: 2022-02-11 | End: 2022-02-16

## 2022-02-11 NOTE — PROGRESS NOTES
[address] [facility]    History of Present Illness:   Andrews Cardoza is a 68 y.o. female with history of HTN, DM, OA of the knees  CC: Right knee pain  History provided by patient and Records    HPI:  Knee Pain:  Reports pain of the Right  medial side of the knee. Pain secondary to unknown that occurred during unknown. Overall symptoms are are worsening. History of known OA of the knees  Location: Right  medial  Duration: Pain/Injury started 1 weeks  ago. Pain lasts waxes and wanes daily. Quality: aching in characteristic. Severity: pain is excruciating , incapacitating and unbearable(9-10 pain scale)  Onset: sudden  Radiation: distally  Other Symptoms: Noting Swelling and locking. Denies redness. Patient does not endorse weakness/tingling sensation. Modifying Factors: Pain is worsened by standing, rising after sitting. Tylenol ineefective  Previous Injuries/Surgeries: None  Previous Imaging: Bilateral OA knees, imaging 2018. Hypertension Follow up:  Currently Taking:   Key CAD CHF Meds             lisinopriL (PRINIVIL, ZESTRIL) 40 mg tablet (Taking) Take 1 Tablet by mouth daily. APPT REQUIRED FOR REFILLS    aspirin delayed-release 81 mg tablet (Taking) Take 81 mg by mouth. amLODIPine (NORVASC) 2.5 mg tablet (Taking) Take 1 Tab by mouth daily. The patient reports:  taking medications as instructed, no medication side effects noted, no TIA's, no chest pain on exertion, no dyspnea on exertion, no swelling of ankles, no orthostatic dizziness or lightheadedness, no orthopnea or paroxysmal nocturnal dyspnea.      BP Readings from Last 3 Encounters:   02/11/22 (!) 153/81   12/27/21 129/82   11/24/21 115/75     Diabetes: Diet Controlled    Health Maintenance  Health Maintenance Due   Topic Date Due    DTaP/Tdap/Td series (1 - Tdap) Never done    Shingrix Vaccine Age 50> (1 of 2) Never done    Eye Exam Retinal or Dilated  11/12/2015    COVID-19 Vaccine (3 - Booster for Moderna series) 08/05/2021       Past Medical, Family, and Social History:     Current Outpatient Medications on File Prior to Visit   Medication Sig Dispense Refill    lisinopriL (PRINIVIL, ZESTRIL) 40 mg tablet Take 1 Tablet by mouth daily. APPT REQUIRED FOR REFILLS 30 Tablet 0    albuterol (PROVENTIL VENTOLIN) 2.5 mg /3 mL (0.083 %) nebu 3 mL by Nebulization route every four (4) hours as needed for Wheezing. 75 mL 0    diclofenac EC (VOLTAREN) 75 mg EC tablet TAKE ONE TABLET BY MOUTH TWICE DAILY AS NEEDED 60 Tablet 0    docusate sodium (COLACE) 100 mg capsule TAKE ONE CAPSULE BY MOUTH TWICE DAILY 180 Capsule 1    calcium-vitamin D (Calcium 600 + D,3,) 600 mg(1,500mg) -200 unit tab Take 1 Tablet by mouth daily. 90 Tablet 1    Metamucil Fiber Singles 3.4 gram pwpk packet MIX 1 PACKET IN LIQUID AND DRINK DAILY 30 Packet 0    Ventolin HFA 90 mcg/actuation inhaler INHALE 2 PUFFS BY MOUTH EVERY 6 HOURS AS NEEDED FOR WHEEZING 18 g 5    cyanocobalamin (VITAMIN B-12) 500 mcg tablet Take 500 mcg by mouth daily.  hydrocortisone (CORTAID) 1 % topical cream Apply  to affected area two (2) times a day. use thin layer 30 g 0    aspirin delayed-release 81 mg tablet Take 81 mg by mouth.  mupirocin calcium (BACTROBAN) 2 % topical cream Apply  to affected area two (2) times a day. 15 g 0    amLODIPine (NORVASC) 2.5 mg tablet Take 1 Tab by mouth daily. 30 Tab 4    OLANZapine (ZYPREXA) 10 mg tablet       lurasidone (LATUDA) 80 mg tab tablet Take 1 Tab by mouth two (2) times a day. 30 Tab 0    Metamucil, with sugar, 3.4 gram pwpk MIX 1 PACKET IN LIQUID AND DRINK DAILY 30 Packet 5    polyethylene glycol (MIRALAX) 17 gram packet Take 1 Packet by mouth daily. (Patient not taking: Reported on 12/27/2021) 30 Packet 2    mirtazapine (REMERON) 15 mg tablet  (Patient not taking: Reported on 12/27/2021)       No current facility-administered medications on file prior to visit.        Patient Active Problem List   Diagnosis Code    HTN (hypertension) I5    MR (mental retardation) F79    Diabetes mellitus type 2, diet-controlled (HCC) E11.9    Tardive dyskinesia G24.01    Schizophrenia ([address] F20.9    Severe obesity (BMI 35.0-39. 9) with comorbidity ([address] E66.01    Type 2 diabetes with nephropathy ([facility] E11.21    Primary osteoarthritis of left knee M17.12    Valgus deformity, not elsewhere classified, left knee M21.062    B12 deficiency E53.8    Type 2 diabetes mellitus with chronic kidney disease (HCC) E11.22       Social History     Socioeconomic History    Marital status:    Tobacco Use    Smoking status: Former Smoker    Smokeless tobacco: Never Used   Substance and Sexual Activity    Alcohol use: No    Drug use: No        Review of Systems   Review of Systems   Constitutional: Negative for chills and fever. Gastrointestinal: Negative for abdominal pain, nausea and vomiting. Musculoskeletal: Positive for joint pain. Objective:     Visit Vitals  BP (!) 153/81 (BP 1 Location: Right arm, BP Patient Position: Sitting, BP Cuff Size: Adult)   Pulse 82   Temp 98.3 °F (36.8 °C) (Oral)   Resp 16   Ht 5' 2\" (1.575 m)   Wt 188 lb (85.3 kg)   SpO2 98%   BMI 34.39 kg/m²        Physical Exam  Vitals and nursing note reviewed. Constitutional:       Appearance: Normal appearance. HENT:      Head: Normocephalic and atraumatic. Cardiovascular:      Rate and Rhythm: Normal rate and regular rhythm. Pulses: Normal pulses. Heart sounds: Normal heart sounds. No murmur heard. No friction rub. No gallop. Pulmonary:      Effort: Pulmonary effort is normal.      Breath sounds: Normal breath sounds. Abdominal:      General: Abdomen is flat. Bowel sounds are normal.      Palpations: Abdomen is soft. Musculoskeletal:      Cervical back: Normal range of motion and neck supple. Comments: Right Knee: Mild swelling, mild heat, no severe tenderness. Skin:     General: Skin is warm and dry.    Neurological:      Mental Status: She is alert. Pertinent Labs/Studies:      Assessment and orders:       ICD-10-CM ICD-9-CM    1. Primary osteoarthritis of right knee  M17.11 715.16 diclofenac (VOLTAREN) 1 % gel      predniSONE (DELTASONE) 20 mg tablet   2. Primary hypertension  I10 401.9    3. Diabetes mellitus type 2, diet-controlled ([facility])  E11.9 250.00    4. Type 2 diabetes mellitus with chronic kidney disease, without long-term current use of insulin, unspecified CKD stage ([facility])  E11.22 250.40      585.9    5. Undifferentiated schizophrenia ([facility]  F20.3 295.90    6. Type 2 diabetes with nephropathy ([facility])  E11.21 250.40      583.81      Diagnoses and all orders for this visit:    1. Primary osteoarthritis of right knee: Trial of topical nad oral, follow with Injection if needed. -     diclofenac (VOLTAREN) 1 % gel; Apply 1 g to affected area four (4) times daily. Right Knee  -     predniSONE (DELTASONE) 20 mg tablet; Take 40 mg by mouth daily (with breakfast) for 5 days. 2. Primary hypertension: Mildly elevated with pain    3. Diabetes mellitus type 2, diet-controlled ([facility]): Known may increase blood sugar    4. Type 2 diabetes mellitus with chronic kidney disease, without long-term current use of insulin, unspecified CKD stage ([facility]    5. Undifferentiated schizophrenia ([facility]): Stable    6. Type 2 diabetes with nephropathy ([facility]      Follow-up and Dispositions    · Return in about 1 week (around 2/18/2022), or if symptoms worsen or fail to improve, for Injection knee. I have discussed the diagnosis with the patient and the intended plan as seen in the above orders. Social history, medical history, and labs were reviewed. The patient has received an after-visit summary and questions were answered concerning future plans. I have discussed medication side effects and warnings with the patient as well.     MD JOSEFA Beltran & KARSON ORDAZ [facility]  02/11/22

## 2022-02-11 NOTE — PROGRESS NOTES
1. Have you been to the ER, urgent care clinic since your last visit? Hospitalized since your last visit? No    2. Have you seen or consulted any other health care providers outside of the [address] [place] [address] since your last visit? Include any pap smears or colon screening. No    Reviewed record in preparation for visit and have necessary documentation  Goals that were addressed and/or need to be completed during or after this appointment include     Health Maintenance Due   Topic Date Due    DTaP/Tdap/Td series (1 - Tdap) Never done    Shingrix Vaccine Age 50> (1 of 2) Never done    Eye Exam Retinal or Dilated  11/12/2015    COVID-19 Vaccine (3 - Booster for Valentino Flattery series) 08/05/2021       Patient is accompanied by self I have received verbal consent from Lorenza Miller to discuss any/all medical information while they are present in the room.

## 2022-03-18 PROBLEM — M17.12 PRIMARY OSTEOARTHRITIS OF LEFT KNEE: Status: ACTIVE | Noted: 2018-09-21

## 2022-03-19 PROBLEM — M21.062 VALGUS DEFORMITY, NOT ELSEWHERE CLASSIFIED, LEFT KNEE: Status: ACTIVE | Noted: 2018-09-21

## 2022-03-19 PROBLEM — E66.01 SEVERE OBESITY (BMI 35.0-39.9) WITH COMORBIDITY (HCC): Status: ACTIVE | Noted: 2018-04-13

## 2022-03-19 PROBLEM — E53.8 B12 DEFICIENCY: Status: ACTIVE | Noted: 2021-06-09

## 2022-03-20 PROBLEM — E11.21 TYPE 2 DIABETES WITH NEPHROPATHY (HCC): Status: ACTIVE | Noted: 2018-06-11

## 2022-03-20 PROBLEM — E11.22 TYPE 2 DIABETES MELLITUS WITH CHRONIC KIDNEY DISEASE (HCC): Status: ACTIVE | Noted: 2022-02-11

## 2022-06-27 ENCOUNTER — OFFICE VISIT (OUTPATIENT)
Dept: FAMILY MEDICINE CLINIC | Age: 77
End: 2022-06-27
Payer: MEDICARE

## 2022-06-27 VITALS
TEMPERATURE: 98.6 F | WEIGHT: 188 LBS | HEART RATE: 76 BPM | HEIGHT: 62 IN | RESPIRATION RATE: 20 BRPM | DIASTOLIC BLOOD PRESSURE: 71 MMHG | OXYGEN SATURATION: 96 % | SYSTOLIC BLOOD PRESSURE: 161 MMHG | BODY MASS INDEX: 34.6 KG/M2

## 2022-06-27 DIAGNOSIS — F20.3 UNDIFFERENTIATED SCHIZOPHRENIA (HCC): ICD-10-CM

## 2022-06-27 DIAGNOSIS — N18.30 STAGE 3 CHRONIC KIDNEY DISEASE, UNSPECIFIED WHETHER STAGE 3A OR 3B CKD (HCC): ICD-10-CM

## 2022-06-27 DIAGNOSIS — N18.30 TYPE 2 DIABETES MELLITUS WITH STAGE 3 CHRONIC KIDNEY DISEASE, WITHOUT LONG-TERM CURRENT USE OF INSULIN, UNSPECIFIED WHETHER STAGE 3A OR 3B CKD (HCC): ICD-10-CM

## 2022-06-27 DIAGNOSIS — K59.00 CONSTIPATION, UNSPECIFIED CONSTIPATION TYPE: ICD-10-CM

## 2022-06-27 DIAGNOSIS — E11.9 DIABETES MELLITUS TYPE 2, DIET-CONTROLLED (HCC): Primary | ICD-10-CM

## 2022-06-27 DIAGNOSIS — E53.8 B12 DEFICIENCY: ICD-10-CM

## 2022-06-27 DIAGNOSIS — K59.01 SLOW TRANSIT CONSTIPATION: ICD-10-CM

## 2022-06-27 DIAGNOSIS — E11.22 TYPE 2 DIABETES MELLITUS WITH STAGE 3 CHRONIC KIDNEY DISEASE, WITHOUT LONG-TERM CURRENT USE OF INSULIN, UNSPECIFIED WHETHER STAGE 3A OR 3B CKD (HCC): ICD-10-CM

## 2022-06-27 PROCEDURE — G8536 NO DOC ELDER MAL SCRN: HCPCS | Performed by: FAMILY MEDICINE

## 2022-06-27 PROCEDURE — 1090F PRES/ABSN URINE INCON ASSESS: CPT | Performed by: FAMILY MEDICINE

## 2022-06-27 PROCEDURE — G8754 DIAS BP LESS 90: HCPCS | Performed by: FAMILY MEDICINE

## 2022-06-27 PROCEDURE — G8417 CALC BMI ABV UP PARAM F/U: HCPCS | Performed by: FAMILY MEDICINE

## 2022-06-27 PROCEDURE — G8510 SCR DEP NEG, NO PLAN REQD: HCPCS | Performed by: FAMILY MEDICINE

## 2022-06-27 PROCEDURE — G8427 DOCREV CUR MEDS BY ELIG CLIN: HCPCS | Performed by: FAMILY MEDICINE

## 2022-06-27 PROCEDURE — 1101F PT FALLS ASSESS-DOCD LE1/YR: CPT | Performed by: FAMILY MEDICINE

## 2022-06-27 PROCEDURE — G8753 SYS BP > OR = 140: HCPCS | Performed by: FAMILY MEDICINE

## 2022-06-27 PROCEDURE — 1123F ACP DISCUSS/DSCN MKR DOCD: CPT | Performed by: FAMILY MEDICINE

## 2022-06-27 PROCEDURE — 99214 OFFICE O/P EST MOD 30 MIN: CPT | Performed by: FAMILY MEDICINE

## 2022-06-27 PROCEDURE — G8400 PT W/DXA NO RESULTS DOC: HCPCS | Performed by: FAMILY MEDICINE

## 2022-06-27 RX ORDER — POLYETHYLENE GLYCOL 3350 17 G/17G
17 POWDER, FOR SOLUTION ORAL DAILY
Qty: 30 PACKET | Refills: 2 | Status: SHIPPED | OUTPATIENT
Start: 2022-06-27

## 2022-06-27 RX ORDER — DOCUSATE SODIUM 100 MG/1
100 CAPSULE, LIQUID FILLED ORAL 2 TIMES DAILY
Qty: 180 CAPSULE | Refills: 1 | Status: SHIPPED | OUTPATIENT
Start: 2022-06-27

## 2022-06-27 NOTE — PROGRESS NOTES
1. \"Have you been to the ER, urgent care clinic since your last visit? Hospitalized since your last visit? \" No    2. \"Have you seen or consulted any other health care providers outside of the [address] [place] [address] since your last visit? \" No     3. For patients aged 39-70: Has the patient had a colonoscopy / FIT/ Cologuard? NA - based on age      If the patient is female:    4. For patients aged 41-77: Has the patient had a mammogram within the past 2 years? NA - based on age or sex      11. For patients aged 21-65: Has the patient had a pap smear?  NA - based on age or sex    Health Maintenance Due   Topic Date Due    DTaP/Tdap/Td series (1 - Tdap) Never done    Shingrix Vaccine Age 50> (1 of 2) Never done    Eye Exam Retinal or Dilated  11/12/2015    COVID-19 Vaccine (3 - Booster for Moderna series) 08/05/2021    A1C test (Diabetic or Prediabetic)  05/24/2022    Foot Exam Q1  06/09/2022    MICROALBUMIN Q1  06/11/2022

## 2022-06-27 NOTE — PROGRESS NOTES
[address] [facility]    History of Present Illness:   Charlotte Simpson is a 68 y.o. female with history of HTN, DM, OA, Schizophrenia, Obesity, B12 Deficincy  CC: Follow up  History provided by patient and Records    HPI:  Hypertension Follow up:  Currently Taking:   Key CAD CHF Meds             lisinopriL (PRINIVIL, ZESTRIL) 40 mg tablet (Taking) TAKE ONE TABLET BY MOUTH EVERY DAY    aspirin delayed-release 81 mg tablet (Taking) Take 81 mg by mouth. The patient reports:  taking medications as instructed, no medication side effects noted, no TIA's, no chest pain on exertion, no dyspnea on exertion, no swelling of ankles, no orthostatic dizziness or lightheadedness, no orthopnea or paroxysmal nocturnal dyspnea. BP Readings from Last 3 Encounters:   06/27/22 (!) 161/71   02/11/22 (!) 153/81   12/27/21 129/82      Diabetes Follow up: Overall the patient feels well with good energy level. Current Medications:   Key Antihyperglycemic Medications     Patient is on no antihyperglycemic meds. .   Insulin dependence: NO   Frequency of home glucose testing: prn   Blood Sugar range at home: <150    Last eye exam: In past 12 months. Last foot exam: This year.    Polyuria, polyphagia or polydipsia: NO   Retinopathy: NO   Neuropathy SX: NO   Low blood sugar symptoms: NO   Dietary compliance: compliant most of the time   Medication compliance: compliant most of the time   On ASA: YES   Tobacco Use: NO   Depression: NO     Wt Readings from Last 3 Encounters:   06/27/22 188 lb (85.3 kg)   02/11/22 188 lb (85.3 kg)   12/27/21 188 lb (85.3 kg)      Lab Results   Component Value Date/Time    Hemoglobin A1c 6.2 (H) 11/24/2021 01:45 PM    Hemoglobin A1c (POC) 5.5 10/30/2019 03:27 PM      Lab Results   Component Value Date/Time    Microalbumin/Creat ratio (mg/g creat) 190 (H) 06/11/2021 09:15 AM    Microalbumin,urine random 3.32 06/11/2021 09:15 AM       Lab Results   Component Value Date/Time    Creatinine 1.02 11/24/2021 01:45 PM      Schizophrenia: Latuda and Zyprexa still, no other medications. Health Maintenance  Health Maintenance Due   Topic Date Due    DTaP/Tdap/Td series (1 - Tdap) Never done    Shingrix Vaccine Age 50> (1 of 2) Never done    Eye Exam Retinal or Dilated  11/12/2015    COVID-19 Vaccine (3 - Booster for Moderna series) 08/05/2021    A1C test (Diabetic or Prediabetic)  05/24/2022    MICROALBUMIN Q1  06/11/2022       Past Medical, Family, and Social History:     Current Outpatient Medications on File Prior to Visit   Medication Sig Dispense Refill    lisinopriL (PRINIVIL, ZESTRIL) 40 mg tablet TAKE ONE TABLET BY MOUTH EVERY DAY 90 Tablet 0    diclofenac (VOLTAREN) 1 % gel Apply 1 g to affected area four (4) times daily. Right Knee 150 g 1    albuterol (PROVENTIL VENTOLIN) 2.5 mg /3 mL (0.083 %) nebu 3 mL by Nebulization route every four (4) hours as needed for Wheezing. 75 mL 0    diclofenac EC (VOLTAREN) 75 mg EC tablet TAKE ONE TABLET BY MOUTH TWICE DAILY AS NEEDED 60 Tablet 0    calcium-vitamin D (Calcium 600 + D,3,) 600 mg(1,500mg) -200 unit tab Take 1 Tablet by mouth daily. 90 Tablet 1    Ventolin HFA 90 mcg/actuation inhaler INHALE 2 PUFFS BY MOUTH EVERY 6 HOURS AS NEEDED FOR WHEEZING 18 g 5    cyanocobalamin (VITAMIN B-12) 500 mcg tablet Take 500 mcg by mouth daily.  hydrocortisone (CORTAID) 1 % topical cream Apply  to affected area two (2) times a day. use thin layer 30 g 0    aspirin delayed-release 81 mg tablet Take 81 mg by mouth.  mupirocin calcium (BACTROBAN) 2 % topical cream Apply  to affected area two (2) times a day. 15 g 0    OLANZapine (ZYPREXA) 10 mg tablet       lurasidone (LATUDA) 80 mg tab tablet Take 1 Tab by mouth two (2) times a day.  30 Tab 0    [DISCONTINUED] docusate sodium (COLACE) 100 mg capsule TAKE ONE CAPSULE BY MOUTH TWICE DAILY 180 Capsule 1    [DISCONTINUED] Metamucil, with sugar, 3.4 gram pwpk MIX 1 PACKET IN LIQUID AND DRINK DAILY (Patient not taking: Reported on 6/27/2022) 30 Packet 5    [DISCONTINUED] Metamucil Fiber Singles 3.4 gram pwpk packet MIX 1 PACKET IN LIQUID AND DRINK DAILY (Patient not taking: Reported on 6/27/2022) 30 Packet 0    [DISCONTINUED] polyethylene glycol (MIRALAX) 17 gram packet Take 1 Packet by mouth daily. 30 Packet 2    [DISCONTINUED] amLODIPine (NORVASC) 2.5 mg tablet Take 1 Tab by mouth daily. 30 Tab 4    [DISCONTINUED] mirtazapine (REMERON) 15 mg tablet  (Patient not taking: Reported on 12/27/2021)       No current facility-administered medications on file prior to visit. Patient Active Problem List   Diagnosis Code    HTN (hypertension) I5    MR (mental retardation) F79    Diabetes mellitus type 2, diet-controlled ([address] E11.9    Tardive dyskinesia G24.01    Schizophrenia ([address] F20.9    Severe obesity (BMI 35.0-39. 9) with comorbidity ([address] E66.01    Type 2 diabetes with nephropathy ([address] E11.21    Primary osteoarthritis of left knee M17.12    Valgus deformity, not elsewhere classified, left knee M21.062    B12 deficiency E53.8    Type 2 diabetes mellitus with chronic kidney disease (HCC) E11.22    Chronic renal disease, stage III N18.30       Social History     Socioeconomic History    Marital status:    Tobacco Use    Smoking status: Former Smoker    Smokeless tobacco: Never Used   Substance and Sexual Activity    Alcohol use: No    Drug use: No        Review of Systems   Review of Systems   Constitutional: Negative for chills and fever. HENT: Negative for congestion. Cardiovascular: Negative for chest pain and palpitations. Gastrointestinal: Negative for abdominal pain, nausea and vomiting. Objective:     Visit Vitals  BP (!) 161/71   Pulse 76   Temp 98.6 °F (37 °C) (Oral)   Resp 20   Ht 5' 2\" (1.575 m)   Wt 188 lb (85.3 kg)   SpO2 96%   BMI 34.39 kg/m²        Physical Exam  Vitals and nursing note reviewed.    Constitutional:       Appearance: Normal appearance. HENT:      Head: Normocephalic and atraumatic. Cardiovascular:      Rate and Rhythm: Normal rate and regular rhythm. Pulses: Normal pulses. Heart sounds: Normal heart sounds. No murmur heard. No friction rub. No gallop. Pulmonary:      Effort: Pulmonary effort is normal.      Breath sounds: Normal breath sounds. Abdominal:      General: Abdomen is flat. Bowel sounds are normal.      Palpations: Abdomen is soft. Musculoskeletal:      Cervical back: Normal range of motion and neck supple. Neurological:      Mental Status: She is alert. Diabetic foot exam:     Left Foot:   Visual Exam: normal    Pulse DP: 2+ (normal)   Filament test: normal sensation    Vibratory sensation: normal      Right Foot:   Visual Exam: normal    Pulse DP: 2+ (normal)   Filament test: normal sensation    Vibratory sensation: normal         Pertinent Labs/Studies:      Assessment and orders:       ICD-10-CM ICD-9-CM    1. Diabetes mellitus type 2, diet-controlled ([facility])  E11.9 250.00 LIPID PANEL      HEMOGLOBIN A1C WITH EAG      MICROALBUMIN, UR, RAND W/ MICROALB/CREAT RATIO      MICROALBUMIN, UR, RAND W/ MICROALB/CREAT RATIO      HEMOGLOBIN A1C WITH EAG      LIPID PANEL   2. Slow transit constipation  K59.01 564.01 polyethylene glycol (MIRALAX) 17 gram packet   3. Constipation, unspecified constipation type  K59.00 564.00 docusate sodium (COLACE) 100 mg capsule   4. Type 2 diabetes mellitus with stage 3 chronic kidney disease, without long-term current use of insulin, unspecified whether stage 3a or 3b CKD ([facility])  E11.22 250.40 CBC W/O DIFF    U20.59 103.8 METABOLIC PANEL, COMPREHENSIVE       DIABETES FOOT EXAM      METABOLIC PANEL, COMPREHENSIVE      CBC W/O DIFF   5. Stage 3 chronic kidney disease, unspecified whether stage 3a or 3b CKD ([facility])  N18.30 585.3    6. Undifferentiated schizophrenia ([facility]  F20.3 295.90    7. B12 deficiency  E53.8 266.2      Diagnoses and all orders for this visit:    1. Diabetes mellitus type 2, diet-controlled ([facility]  -     LIPID PANEL; Future  -     HEMOGLOBIN A1C WITH EAG; Future  -     MICROALBUMIN, UR, RAND W/ MICROALB/CREAT RATIO; Future    2. Slow transit constipation  -     polyethylene glycol (MIRALAX) 17 gram packet; Take 1 Packet by mouth daily. 3. Constipation, unspecified constipation type  -     docusate sodium (COLACE) 100 mg capsule; Take 1 Capsule by mouth two (2) times a day. 4. Type 2 diabetes mellitus with stage 3 chronic kidney disease, without long-term current use of insulin, unspecified whether stage 3a or 3b CKD ([facility])  -     CBC W/O DIFF; Future  -     METABOLIC PANEL, COMPREHENSIVE; Future  -      DIABETES FOOT EXAM    5. Stage 3 chronic kidney disease, unspecified whether stage 3a or 3b CKD ([facility]    6. Undifferentiated schizophrenia ([facility]    7. B12 deficiency      Follow-up and Dispositions    · Return in about 3 months (around 9/27/2022). I have discussed the diagnosis with the patient and the intended plan as seen in the above orders. Social history, medical history, and labs were reviewed. The patient has received an after-visit summary and questions were answered concerning future plans. I have discussed medication side effects and warnings with the patient as well.     MD JOSEFA Acevedo & KARSON ORDAZ [facility]  06/27/22

## 2022-06-28 LAB
ALBUMIN SERPL-MCNC: 4.3 G/DL (ref 3.5–5)
ALBUMIN/GLOB SERPL: 1.3 {RATIO} (ref 1.1–2.2)
ALP SERPL-CCNC: 58 U/L (ref 45–117)
ALT SERPL-CCNC: 13 U/L (ref 12–78)
ANION GAP SERPL CALC-SCNC: 5 MMOL/L (ref 5–15)
AST SERPL-CCNC: 11 U/L (ref 15–37)
BILIRUB SERPL-MCNC: 0.3 MG/DL (ref 0.2–1)
BUN SERPL-MCNC: 18 MG/DL (ref 6–20)
BUN/CREAT SERPL: 21 (ref 12–20)
CALCIUM SERPL-MCNC: 9.6 MG/DL (ref 8.5–10.1)
CHLORIDE SERPL-SCNC: 103 MMOL/L (ref 97–108)
CHOLEST SERPL-MCNC: 170 MG/DL
CO2 SERPL-SCNC: 28 MMOL/L (ref 21–32)
CREAT SERPL-MCNC: 0.86 MG/DL (ref 0.55–1.02)
CREAT UR-MCNC: 27.4 MG/DL
ERYTHROCYTE [DISTWIDTH] IN BLOOD BY AUTOMATED COUNT: 14.6 % (ref 11.5–14.5)
EST. AVERAGE GLUCOSE BLD GHB EST-MCNC: 120 MG/DL
GLOBULIN SER CALC-MCNC: 3.3 G/DL (ref 2–4)
GLUCOSE SERPL-MCNC: 97 MG/DL (ref 65–100)
HBA1C MFR BLD: 5.8 % (ref 4–5.6)
HCT VFR BLD AUTO: 33.1 % (ref 35–47)
HDLC SERPL-MCNC: 68 MG/DL
HDLC SERPL: 2.5 {RATIO} (ref 0–5)
HGB BLD-MCNC: 10.4 G/DL (ref 11.5–16)
LDLC SERPL CALC-MCNC: 88.8 MG/DL (ref 0–100)
MCH RBC QN AUTO: 31 PG (ref 26–34)
MCHC RBC AUTO-ENTMCNC: 31.4 G/DL (ref 30–36.5)
MCV RBC AUTO: 98.8 FL (ref 80–99)
MICROALBUMIN UR-MCNC: 0.69 MG/DL
MICROALBUMIN/CREAT UR-RTO: 25 MG/G (ref 0–30)
NRBC # BLD: 0 K/UL (ref 0–0.01)
NRBC BLD-RTO: 0 PER 100 WBC
PLATELET # BLD AUTO: 337 K/UL (ref 150–400)
PMV BLD AUTO: 8.9 FL (ref 8.9–12.9)
POTASSIUM SERPL-SCNC: 4.2 MMOL/L (ref 3.5–5.1)
PROT SERPL-MCNC: 7.6 G/DL (ref 6.4–8.2)
RBC # BLD AUTO: 3.35 M/UL (ref 3.8–5.2)
SODIUM SERPL-SCNC: 136 MMOL/L (ref 136–145)
TRIGL SERPL-MCNC: 66 MG/DL (ref ?–150)
VLDLC SERPL CALC-MCNC: 13.2 MG/DL
WBC # BLD AUTO: 6.8 K/UL (ref 3.6–11)

## 2022-09-22 ENCOUNTER — OFFICE VISIT (OUTPATIENT)
Dept: FAMILY MEDICINE CLINIC | Age: 77
End: 2022-09-22
Payer: MEDICARE

## 2022-09-22 VITALS
HEART RATE: 76 BPM | RESPIRATION RATE: 16 BRPM | WEIGHT: 199 LBS | OXYGEN SATURATION: 96 % | HEIGHT: 62 IN | TEMPERATURE: 97.8 F | SYSTOLIC BLOOD PRESSURE: 159 MMHG | BODY MASS INDEX: 36.62 KG/M2 | DIASTOLIC BLOOD PRESSURE: 75 MMHG

## 2022-09-22 DIAGNOSIS — Z23 ENCOUNTER FOR IMMUNIZATION: ICD-10-CM

## 2022-09-22 DIAGNOSIS — G24.01 TARDIVE DYSKINESIA: ICD-10-CM

## 2022-09-22 DIAGNOSIS — E55.9 VITAMIN D DEFICIENCY: ICD-10-CM

## 2022-09-22 DIAGNOSIS — F20.3 UNDIFFERENTIATED SCHIZOPHRENIA (HCC): ICD-10-CM

## 2022-09-22 DIAGNOSIS — E66.01 SEVERE OBESITY (BMI 35.0-39.9) WITH COMORBIDITY (HCC): ICD-10-CM

## 2022-09-22 DIAGNOSIS — E53.8 B12 DEFICIENCY: ICD-10-CM

## 2022-09-22 DIAGNOSIS — E11.21 TYPE 2 DIABETES WITH NEPHROPATHY (HCC): ICD-10-CM

## 2022-09-22 DIAGNOSIS — E11.9 DIABETES MELLITUS TYPE 2, DIET-CONTROLLED (HCC): ICD-10-CM

## 2022-09-22 DIAGNOSIS — I10 PRIMARY HYPERTENSION: Primary | ICD-10-CM

## 2022-09-22 DIAGNOSIS — M17.12 PRIMARY OSTEOARTHRITIS OF LEFT KNEE: ICD-10-CM

## 2022-09-22 PROBLEM — E11.22 TYPE 2 DIABETES MELLITUS WITH CHRONIC KIDNEY DISEASE (HCC): Status: RESOLVED | Noted: 2022-02-11 | Resolved: 2022-09-22

## 2022-09-22 PROBLEM — N18.30 CHRONIC RENAL DISEASE, STAGE III (HCC): Status: RESOLVED | Noted: 2022-06-27 | Resolved: 2022-09-22

## 2022-09-22 PROCEDURE — 3044F HG A1C LEVEL LT 7.0%: CPT | Performed by: FAMILY MEDICINE

## 2022-09-22 PROCEDURE — 1123F ACP DISCUSS/DSCN MKR DOCD: CPT | Performed by: FAMILY MEDICINE

## 2022-09-22 PROCEDURE — 1090F PRES/ABSN URINE INCON ASSESS: CPT | Performed by: FAMILY MEDICINE

## 2022-09-22 PROCEDURE — 99214 OFFICE O/P EST MOD 30 MIN: CPT | Performed by: FAMILY MEDICINE

## 2022-09-22 PROCEDURE — G8753 SYS BP > OR = 140: HCPCS | Performed by: FAMILY MEDICINE

## 2022-09-22 PROCEDURE — G8427 DOCREV CUR MEDS BY ELIG CLIN: HCPCS | Performed by: FAMILY MEDICINE

## 2022-09-22 PROCEDURE — 1101F PT FALLS ASSESS-DOCD LE1/YR: CPT | Performed by: FAMILY MEDICINE

## 2022-09-22 PROCEDURE — G8510 SCR DEP NEG, NO PLAN REQD: HCPCS | Performed by: FAMILY MEDICINE

## 2022-09-22 PROCEDURE — G8754 DIAS BP LESS 90: HCPCS | Performed by: FAMILY MEDICINE

## 2022-09-22 PROCEDURE — G8536 NO DOC ELDER MAL SCRN: HCPCS | Performed by: FAMILY MEDICINE

## 2022-09-22 PROCEDURE — 90694 VACC AIIV4 NO PRSRV 0.5ML IM: CPT | Performed by: FAMILY MEDICINE

## 2022-09-22 PROCEDURE — G0008 ADMIN INFLUENZA VIRUS VAC: HCPCS | Performed by: FAMILY MEDICINE

## 2022-09-22 PROCEDURE — G8417 CALC BMI ABV UP PARAM F/U: HCPCS | Performed by: FAMILY MEDICINE

## 2022-09-22 PROCEDURE — G8400 PT W/DXA NO RESULTS DOC: HCPCS | Performed by: FAMILY MEDICINE

## 2022-09-22 RX ORDER — MIRTAZAPINE 15 MG/1
TABLET, FILM COATED ORAL
COMMUNITY
Start: 2022-09-12

## 2022-09-22 RX ORDER — ALBUTEROL SULFATE 90 UG/1
AEROSOL, METERED RESPIRATORY (INHALATION)
Qty: 18 G | Refills: 5 | Status: SHIPPED | OUTPATIENT
Start: 2022-09-22

## 2022-09-22 NOTE — PROGRESS NOTES
[facility]    History of Present Illness:   Jeison Veronica is a 68 y.o. female with history of HTN, DM, HLD, OA, Schizophrenia  CC: Follow up  History provided by patient and Records    HPI:  Hypertension Follow up:  Currently Taking:   Key CAD CHF Meds               lisinopriL (PRINIVIL, ZESTRIL) 40 mg tablet (Taking) TAKE ONE TABLET BY MOUTH EVERY DAY    aspirin delayed-release 81 mg tablet (Taking) Take 81 mg by mouth. The patient reports:  taking medications as instructed, no medication side effects noted, no TIA's, no chest pain on exertion, no dyspnea on exertion, no swelling of ankles, no orthostatic dizziness or lightheadedness, no orthopnea or paroxysmal nocturnal dyspnea. BP Readings from Last 3 Encounters:   09/22/22 (!) 166/73   06/27/22 (!) 161/71   02/11/22 (!) 153/81      Schizophrenia: Going to [facility] and started on Remeron, Zyprexa, and Latuda per patient. Health Maintenance  Health Maintenance Due   Topic Date Due    DTaP/Tdap/Td series (1 - Tdap) Never done    Shingrix Vaccine Age 49> (1 of 2) Never done    Eye Exam Retinal or Dilated  11/12/2015    COVID-19 Vaccine (3 - Booster for Moderna series) 08/05/2021       Past Medical, Family, and Social History:     Current Outpatient Medications on File Prior to Visit   Medication Sig Dispense Refill    mirtazapine (REMERON) 15 mg tablet       albuterol (PROVENTIL VENTOLIN) 2.5 mg /3 mL (0.083 %) nebu INHALE 3 ML VIA NEBULIZER EVERY 4 HOURS AS NEEDED FOR WHEEZING 75 mL 1    lisinopriL (PRINIVIL, ZESTRIL) 40 mg tablet TAKE ONE TABLET BY MOUTH EVERY DAY 90 Tablet 1    polyethylene glycol (MIRALAX) 17 gram packet Take 1 Packet by mouth daily. 30 Packet 2    docusate sodium (COLACE) 100 mg capsule Take 1 Capsule by mouth two (2) times a day. 180 Capsule 1    diclofenac (VOLTAREN) 1 % gel Apply 1 g to affected area four (4) times daily.  Right Knee 150 g 1    diclofenac EC (VOLTAREN) 75 mg EC tablet TAKE ONE TABLET BY MOUTH TWICE DAILY AS NEEDED 60 Tablet 0    calcium-vitamin D (Calcium 600 + D,3,) 600 mg(1,500mg) -200 unit tab Take 1 Tablet by mouth daily. 90 Tablet 1    cyanocobalamin (VITAMIN B12) 500 mcg tablet Take 500 mcg by mouth daily. hydrocortisone (CORTAID) 1 % topical cream Apply  to affected area two (2) times a day. use thin layer 30 g 0    aspirin delayed-release 81 mg tablet Take 81 mg by mouth.      mupirocin calcium (BACTROBAN) 2 % topical cream Apply  to affected area two (2) times a day. 15 g 0    OLANZapine (ZYPREXA) 10 mg tablet       lurasidone (LATUDA) 80 mg tab tablet Take 1 Tab by mouth two (2) times a day. 30 Tab 0    [DISCONTINUED] Ventolin HFA 90 mcg/actuation inhaler INHALE 2 PUFFS BY MOUTH EVERY 6 HOURS AS NEEDED FOR WHEEZING 18 g 5     No current facility-administered medications on file prior to visit. Patient Active Problem List   Diagnosis Code    HTN (hypertension) I10    MR (mental retardation) F79    Diabetes mellitus type 2, diet-controlled ([facility] E11.9    Tardive dyskinesia G24.01    Schizophrenia ([facility] F20.9    Severe obesity (BMI 35.0-39. 9) with comorbidity (HCC) E66.01    Type 2 diabetes with nephropathy ([facility]) E11.21    Primary osteoarthritis of left knee M17.12    Valgus deformity, not elsewhere classified, left knee M21.062    B12 deficiency E53.8       Social History     Socioeconomic History    Marital status:    Tobacco Use    Smoking status: Former     Packs/day: 2.00     Years: 30.00     Pack years: 60.00     Types: Cigarettes     Quit date: 2000     Years since quittin.7    Smokeless tobacco: Never   Substance and Sexual Activity    Alcohol use: No    Drug use: No     Social Determinants of Health     Financial Resource Strain: Low Risk     Difficulty of Paying Living Expenses: Not hard at all   Food Insecurity: Food Insecurity Present    Worried About Running Out of Food in the Last Year: Sometimes true    Bernabe of Food in the Last Year: Never true        Review of Systems   Review of Systems   Constitutional:  Negative for chills and fever. Cardiovascular:  Negative for chest pain and palpitations. Gastrointestinal:  Negative for abdominal pain, nausea and vomiting. Objective:   Visit Vitals  BP (!) 166/73 (BP 1 Location: Right upper arm, BP Patient Position: Sitting, BP Cuff Size: Large adult)   Pulse 76   Temp 97.8 °F (36.6 °C) (Oral)   Resp 16   Ht 5' 2\" (1.575 m)   Wt 199 lb (90.3 kg)   SpO2 96%   BMI 36.40 kg/m²        Physical Exam  Vitals and nursing note reviewed. Constitutional:       Appearance: Normal appearance. HENT:      Head: Normocephalic and atraumatic. Cardiovascular:      Rate and Rhythm: Normal rate and regular rhythm. Pulses: Normal pulses. Heart sounds: Normal heart sounds. No murmur heard. No friction rub. No gallop. Pulmonary:      Effort: Pulmonary effort is normal.      Breath sounds: Normal breath sounds. Abdominal:      General: Abdomen is flat. Bowel sounds are normal.      Palpations: Abdomen is soft. Musculoskeletal:      Cervical back: Normal range of motion and neck supple. Skin:     General: Skin is warm and dry. Neurological:      Mental Status: She is alert. Mental status is at baseline. Psychiatric:         Mood and Affect: Mood normal.         Behavior: Behavior normal.       Pertinent Labs/Studies:      Assessment and orders:       ICD-10-CM ICD-9-CM    1. Primary hypertension  I10 401.9 CBC W/O DIFF      METABOLIC PANEL, COMPREHENSIVE      2. Encounter for immunization  Z23 V03.89 ADMIN INFLUENZA VIRUS VAC      INFLUENZA, FLUAD, (AGE 72 Y+), IM, PF, 0.5 ML      3. Severe obesity (BMI 35.0-39. 9) with comorbidity ([facility]  E66.01 278.01       4. Diabetes mellitus type 2, diet-controlled (HCC)  E11.9 250.00 LIPID PANEL      HEMOGLOBIN A1C WITH EAG      5. Type 2 diabetes with nephropathy (HCC)  E11.21 250.40 LIPID PANEL     583.81 HEMOGLOBIN A1C WITH EAG      6.  Tardive dyskinesia  G24.01 333.85       7. Primary osteoarthritis of left knee  M17.12 715.16       8. Undifferentiated schizophrenia ([facility]  F20.3 295.90       9. Vitamin D deficiency  E55.9 268.9 VITAMIN D, 25 HYDROXY      10. B12 deficiency  E53.8 266.2 VITAMIN B12 & FOLATE        Diagnoses and all orders for this visit:    1. Primary hypertension: Our goal is to normalize the blood pressure to decrease the risks of strokes and heart attacks. The patient is in agreement with the plan. Reports better at home nad reports taking Lisinopril  -     CBC W/O DIFF; Future  -     METABOLIC PANEL, COMPREHENSIVE; Future    2. Encounter for immunization  -     ADMIN INFLUENZA VIRUS VAC  -     INFLUENZA, FLUAD, (AGE 65 Y+), IM, PF, 0.5 ML    3. Severe obesity (BMI 35.0-39. 9) with comorbidity ([facility]    4. Diabetes mellitus type 2, diet-controlled ([facility]): Discussed with patient today that the goal for their diabetes is to have a HgA1C<7 and ideally as close to 6.5 as possible. We discussed diet and medications. The goal for the cholesterol LDL is less than 70 and HDL>40. Patient is aware of the need for yearly eye exams and to take care of their feet daily. Discussed with patient that blood pressure should be less than 130/80 and watching salt intake is very important.    -     LIPID PANEL; Future  -     HEMOGLOBIN A1C WITH EAG; Future    5. Type 2 diabetes with nephropathy (HCC)  -     LIPID PANEL; Future  -     HEMOGLOBIN A1C WITH EAG; Future    6. Tardive dyskinesia    7. Primary osteoarthritis of left knee    8. Undifferentiated schizophrenia ([facility]    9. Vitamin D deficiency  -     VITAMIN D, 25 HYDROXY; Future    10. B12 deficiency  -     VITAMIN B12 & FOLATE; Future    Other orders  -     albuterol (Ventolin HFA) 90 mcg/actuation inhaler; INHALE 2 PUFFS BY MOUTH EVERY 6 HOURS AS NEEDED FOR WHEEZING    Follow-up and Dispositions    Return in about 3 months (around 12/22/2022).            I have discussed the diagnosis with the patient and the intended plan as seen in the above orders. Social history, medical history, and labs were reviewed. The patient has received an after-visit summary and questions were answered concerning future plans. I have discussed medication side effects and warnings with the patient as well.     MD JOSEFA Bolaños & KARSON ORDAZ [facility]  09/22/22

## 2022-09-22 NOTE — PROGRESS NOTES
Chief Complaint   Patient presents with    Follow Up Chronic Condition     1. \"Have you been to the ER, urgent care clinic since your last visit? Hospitalized since your last visit? \" No    2. \"Have you seen or consulted any other health care providers outside of the [address] [place] [address] since your last visit? \" No     3. For patients aged 39-70: Has the patient had a colonoscopy / FIT/ Cologuard? NA - based on age      If the patient is female:    4. For patients aged 41-77: Has the patient had a mammogram within the past 2 years? NA - based on age or sex      11. For patients aged 21-65: Has the patient had a pap smear?  NA - based on age or sex    Health Maintenance Due   Topic Date Due    DTaP/Tdap/Td series (1 - Tdap) Never done    Shingrix Vaccine Age 49> (1 of 2) Never done    Eye Exam Retinal or Dilated  11/12/2015    COVID-19 Vaccine (3 - Booster for Moderna series) 08/05/2021    Flu Vaccine (1) 08/01/2022

## 2022-09-23 LAB
25(OH)D3 SERPL-MCNC: 27.7 NG/ML (ref 30–100)
ALBUMIN SERPL-MCNC: 4.1 G/DL (ref 3.5–5)
ALBUMIN/GLOB SERPL: 1.3 {RATIO} (ref 1.1–2.2)
ALP SERPL-CCNC: 55 U/L (ref 45–117)
ALT SERPL-CCNC: 15 U/L (ref 12–78)
ANION GAP SERPL CALC-SCNC: 5 MMOL/L (ref 5–15)
AST SERPL-CCNC: 11 U/L (ref 15–37)
BILIRUB SERPL-MCNC: 0.3 MG/DL (ref 0.2–1)
BUN SERPL-MCNC: 17 MG/DL (ref 6–20)
BUN/CREAT SERPL: 17 (ref 12–20)
CALCIUM SERPL-MCNC: 9.1 MG/DL (ref 8.5–10.1)
CHLORIDE SERPL-SCNC: 102 MMOL/L (ref 97–108)
CHOLEST SERPL-MCNC: 175 MG/DL
CO2 SERPL-SCNC: 28 MMOL/L (ref 21–32)
CREAT SERPL-MCNC: 1.02 MG/DL (ref 0.55–1.02)
ERYTHROCYTE [DISTWIDTH] IN BLOOD BY AUTOMATED COUNT: 14.6 % (ref 11.5–14.5)
EST. AVERAGE GLUCOSE BLD GHB EST-MCNC: 126 MG/DL
FOLATE SERPL-MCNC: 16.7 NG/ML (ref 5–21)
GLOBULIN SER CALC-MCNC: 3.2 G/DL (ref 2–4)
GLUCOSE SERPL-MCNC: 94 MG/DL (ref 65–100)
HBA1C MFR BLD: 6 % (ref 4–5.6)
HCT VFR BLD AUTO: 30.9 % (ref 35–47)
HDLC SERPL-MCNC: 78 MG/DL
HDLC SERPL: 2.2 {RATIO} (ref 0–5)
HGB BLD-MCNC: 9.7 G/DL (ref 11.5–16)
LDLC SERPL CALC-MCNC: 87.4 MG/DL (ref 0–100)
MCH RBC QN AUTO: 31.2 PG (ref 26–34)
MCHC RBC AUTO-ENTMCNC: 31.4 G/DL (ref 30–36.5)
MCV RBC AUTO: 99.4 FL (ref 80–99)
NRBC # BLD: 0 K/UL (ref 0–0.01)
NRBC BLD-RTO: 0 PER 100 WBC
PLATELET # BLD AUTO: 302 K/UL (ref 150–400)
PMV BLD AUTO: 9.1 FL (ref 8.9–12.9)
POTASSIUM SERPL-SCNC: 4.3 MMOL/L (ref 3.5–5.1)
PROT SERPL-MCNC: 7.3 G/DL (ref 6.4–8.2)
RBC # BLD AUTO: 3.11 M/UL (ref 3.8–5.2)
SODIUM SERPL-SCNC: 135 MMOL/L (ref 136–145)
TRIGL SERPL-MCNC: 48 MG/DL (ref ?–150)
VIT B12 SERPL-MCNC: >2000 PG/ML (ref 193–986)
VLDLC SERPL CALC-MCNC: 9.6 MG/DL
WBC # BLD AUTO: 7.8 K/UL (ref 3.6–11)

## 2022-12-19 ENCOUNTER — OFFICE VISIT (OUTPATIENT)
Dept: FAMILY MEDICINE CLINIC | Age: 77
End: 2022-12-19
Payer: MEDICARE

## 2022-12-19 VITALS
BODY MASS INDEX: 35.51 KG/M2 | SYSTOLIC BLOOD PRESSURE: 140 MMHG | HEIGHT: 62 IN | OXYGEN SATURATION: 98 % | DIASTOLIC BLOOD PRESSURE: 73 MMHG | RESPIRATION RATE: 20 BRPM | TEMPERATURE: 97.6 F | WEIGHT: 193 LBS | HEART RATE: 76 BPM

## 2022-12-19 DIAGNOSIS — E53.8 B12 DEFICIENCY: ICD-10-CM

## 2022-12-19 DIAGNOSIS — G24.01 TARDIVE DYSKINESIA: ICD-10-CM

## 2022-12-19 DIAGNOSIS — M17.12 PRIMARY OSTEOARTHRITIS OF LEFT KNEE: ICD-10-CM

## 2022-12-19 DIAGNOSIS — I10 ESSENTIAL HYPERTENSION: ICD-10-CM

## 2022-12-19 DIAGNOSIS — F20.3 UNDIFFERENTIATED SCHIZOPHRENIA (HCC): ICD-10-CM

## 2022-12-19 DIAGNOSIS — E11.9 DIABETES MELLITUS TYPE 2, DIET-CONTROLLED (HCC): ICD-10-CM

## 2022-12-19 DIAGNOSIS — I10 PRIMARY HYPERTENSION: Primary | ICD-10-CM

## 2022-12-19 PROBLEM — E11.22 TYPE 2 DIABETES MELLITUS WITH CHRONIC KIDNEY DISEASE (HCC): Status: ACTIVE | Noted: 2022-12-19

## 2022-12-19 PROCEDURE — G8400 PT W/DXA NO RESULTS DOC: HCPCS | Performed by: FAMILY MEDICINE

## 2022-12-19 PROCEDURE — G8754 DIAS BP LESS 90: HCPCS | Performed by: FAMILY MEDICINE

## 2022-12-19 PROCEDURE — 3078F DIAST BP <80 MM HG: CPT | Performed by: FAMILY MEDICINE

## 2022-12-19 PROCEDURE — G8753 SYS BP > OR = 140: HCPCS | Performed by: FAMILY MEDICINE

## 2022-12-19 PROCEDURE — G8536 NO DOC ELDER MAL SCRN: HCPCS | Performed by: FAMILY MEDICINE

## 2022-12-19 PROCEDURE — 1101F PT FALLS ASSESS-DOCD LE1/YR: CPT | Performed by: FAMILY MEDICINE

## 2022-12-19 PROCEDURE — G8432 DEP SCR NOT DOC, RNG: HCPCS | Performed by: FAMILY MEDICINE

## 2022-12-19 PROCEDURE — 1090F PRES/ABSN URINE INCON ASSESS: CPT | Performed by: FAMILY MEDICINE

## 2022-12-19 PROCEDURE — G8417 CALC BMI ABV UP PARAM F/U: HCPCS | Performed by: FAMILY MEDICINE

## 2022-12-19 PROCEDURE — G8427 DOCREV CUR MEDS BY ELIG CLIN: HCPCS | Performed by: FAMILY MEDICINE

## 2022-12-19 PROCEDURE — 3074F SYST BP LT 130 MM HG: CPT | Performed by: FAMILY MEDICINE

## 2022-12-19 PROCEDURE — 3044F HG A1C LEVEL LT 7.0%: CPT | Performed by: FAMILY MEDICINE

## 2022-12-19 PROCEDURE — 99214 OFFICE O/P EST MOD 30 MIN: CPT | Performed by: FAMILY MEDICINE

## 2022-12-19 PROCEDURE — 1123F ACP DISCUSS/DSCN MKR DOCD: CPT | Performed by: FAMILY MEDICINE

## 2022-12-19 RX ORDER — LISINOPRIL 40 MG/1
40 TABLET ORAL DAILY
Qty: 90 TABLET | Refills: 1 | Status: SHIPPED | OUTPATIENT
Start: 2022-12-19

## 2022-12-19 NOTE — PATIENT INSTRUCTIONS
Suzan Falcon with [facility]  [address] [place] [address],  [address] [place], [facility]  [phone]        Blood Pressure Record     Patient Name:  ______________________ :  ______________________    Date/Time BP Reading Pulse

## 2022-12-19 NOTE — PROGRESS NOTES
[facility]    History of Present Illness:   Jerel Rodriguez is a 68 y.o. female with history of  HTN, DM, HLD, OA, Schizophrenia  CC: Chronic conditions  History provided by patient and Records    HPI:  Hypertension Follow up:  Currently Taking:   Key CAD CHF Meds               lisinopriL (PRINIVIL, ZESTRIL) 40 mg tablet (Taking) Take 1 Tablet by mouth daily. aspirin delayed-release 81 mg tablet (Taking) Take 81 mg by mouth. The patient reports:  taking medications as instructed, no medication side effects noted, no TIA's, no chest pain on exertion, no dyspnea on exertion, no swelling of ankles, no orthostatic dizziness or lightheadedness, no orthopnea or paroxysmal nocturnal dyspnea. BP Readings from Last 3 Encounters:   12/19/22 (!) 155/74   09/22/22 (!) 159/75   06/27/22 (!) 161/71      Diabetes Follow up: Overall the patient feels well with good energy level. Current Medications:   Key Antihyperglycemic Medications       Patient is on no antihyperglycemic meds. .   Insulin dependence: NO   Frequency of home glucose testing: prn   Blood Sugar range at home: N/A    Last eye exam: In past 12 months. Last foot exam: This year.    Polyuria, polyphagia or polydipsia: NO   Retinopathy: NO   Neuropathy SX: NO   Low blood sugar symptoms: NO   Dietary compliance: compliant most of the time   Medication compliance: compliant most of the time   On ASA: Yes   Tobacco Use: NO   Depression: NO     Wt Readings from Last 3 Encounters:   12/19/22 193 lb (87.5 kg)   09/22/22 199 lb (90.3 kg)   06/27/22 188 lb (85.3 kg)      Lab Results   Component Value Date/Time    Hemoglobin A1c 6.0 (H) 09/22/2022 02:05 PM    Hemoglobin A1c (POC) 5.5 10/30/2019 03:27 PM      Lab Results   Component Value Date/Time    Microalbumin/Creat ratio (mg/g creat) 25 06/27/2022 12:17 PM    Microalbumin,urine random 0.69 06/27/2022 12:17 PM       Lab Results   Component Value Date/Time    Creatinine 1.02 09/22/2022 02:05 PM     OA Left Knee: Mildly irritated due to cold weather. Tardive Dyskinesia: Controlled. Schizophrenia: On Remeron, Zyprexa, and Latuda per patient. Health Maintenance  Health Maintenance Due   Topic Date Due    DTaP/Tdap/Td series (1 - Tdap) Never done    Shingrix Vaccine Age 49> (1 of 2) Never done    Eye Exam Retinal or Dilated  11/12/2015    COVID-19 Vaccine (3 - Booster for Moderna series) 04/30/2021    Medicare Yearly Exam  12/28/2022       Past Medical, Family, and Social History:     Current Outpatient Medications on File Prior to Visit   Medication Sig Dispense Refill    mirtazapine (REMERON) 15 mg tablet       albuterol (Ventolin HFA) 90 mcg/actuation inhaler INHALE 2 PUFFS BY MOUTH EVERY 6 HOURS AS NEEDED FOR WHEEZING 18 g 5    albuterol (PROVENTIL VENTOLIN) 2.5 mg /3 mL (0.083 %) nebu INHALE 3 ML VIA NEBULIZER EVERY 4 HOURS AS NEEDED FOR WHEEZING 75 mL 1    diclofenac (VOLTAREN) 1 % gel Apply 1 g to affected area four (4) times daily. Right Knee 150 g 1    calcium-vitamin D (Calcium 600 + D,3,) 600 mg(1,500mg) -200 unit tab Take 1 Tablet by mouth daily. 90 Tablet 1    cyanocobalamin (VITAMIN B12) 500 mcg tablet Take 500 mcg by mouth daily. hydrocortisone (CORTAID) 1 % topical cream Apply  to affected area two (2) times a day. use thin layer 30 g 0    aspirin delayed-release 81 mg tablet Take 81 mg by mouth. OLANZapine (ZYPREXA) 10 mg tablet       lurasidone (LATUDA) 80 mg tab tablet Take 1 Tab by mouth two (2) times a day. 30 Tab 0    [DISCONTINUED] lisinopriL (PRINIVIL, ZESTRIL) 40 mg tablet TAKE ONE TABLET BY MOUTH EVERY DAY 90 Tablet 1    polyethylene glycol (MIRALAX) 17 gram packet Take 1 Packet by mouth daily. (Patient not taking: Reported on 12/19/2022) 30 Packet 2    docusate sodium (COLACE) 100 mg capsule Take 1 Capsule by mouth two (2) times a day.  (Patient not taking: Reported on 12/19/2022) 180 Capsule 1    [DISCONTINUED] diclofenac EC (VOLTAREN) 75 mg EC tablet TAKE ONE TABLET BY MOUTH TWICE DAILY AS NEEDED (Patient not taking: Reported on 2022) 60 Tablet 0    [DISCONTINUED] mupirocin calcium (BACTROBAN) 2 % topical cream Apply  to affected area two (2) times a day. (Patient not taking: Reported on 2022) 15 g 0     No current facility-administered medications on file prior to visit. Patient Active Problem List   Diagnosis Code    HTN (hypertension) I10    MR (mental retardation) F79    Diabetes mellitus type 2, diet-controlled ([facility] E11.9    Tardive dyskinesia G24.01    Schizophrenia ([facility] F20.9    Severe obesity (BMI 35.0-39. 9) with comorbidity (HCC) E66.01    Type 2 diabetes with nephropathy ([facility]) E11.21    Primary osteoarthritis of left knee M17.12    Valgus deformity, not elsewhere classified, left knee M21.062    B12 deficiency E53.8    Type 2 diabetes mellitus with chronic kidney disease ([facility]) E11.22       Social History     Socioeconomic History    Marital status:    Tobacco Use    Smoking status: Former     Packs/day: 2.00     Years: 30.00     Pack years: 60.00     Types: Cigarettes     Quit date: 2000     Years since quittin.9    Smokeless tobacco: Never   Substance and Sexual Activity    Alcohol use: No    Drug use: No     Social Determinants of Health     Financial Resource Strain: Low Risk     Difficulty of Paying Living Expenses: Not hard at all   Food Insecurity: Food Insecurity Present    Worried About Running Out of Food in the Last Year: Sometimes true    Ran Out of Food in the Last Year: Never true        Review of Systems   Review of Systems   Constitutional:  Negative for chills and fever. HENT:  Negative for congestion. Cardiovascular:  Negative for chest pain and palpitations. Gastrointestinal:  Negative for abdominal pain, nausea and vomiting. Musculoskeletal:  Positive for joint pain. Negative for myalgias. Neurological:  Negative for dizziness, tingling and headaches.      Objective:   Visit Vitals  BP (!) 155/74   Pulse 76   Temp 97.6 °F (36.4 °C)   Resp 20   Ht 5' 2\" (1.575 m)   Wt 193 lb (87.5 kg)   SpO2 98%   BMI 35.30 kg/m²        Physical Exam  Vitals and nursing note reviewed. Constitutional:       Appearance: Normal appearance. HENT:      Head: Normocephalic and atraumatic. Cardiovascular:      Rate and Rhythm: Normal rate and regular rhythm. Pulses: Normal pulses. Heart sounds: Normal heart sounds. No murmur heard. No friction rub. No gallop. Pulmonary:      Effort: Pulmonary effort is normal.      Breath sounds: Normal breath sounds. Abdominal:      General: Abdomen is flat. Bowel sounds are normal.      Palpations: Abdomen is soft. Musculoskeletal:      Cervical back: Normal range of motion and neck supple. Skin:     General: Skin is warm and dry. Neurological:      Mental Status: She is alert. Pertinent Labs/Studies:      Assessment and orders:       ICD-10-CM ICD-9-CM    1. Primary hypertension  I10 401.9 CBC W/O DIFF      METABOLIC PANEL, COMPREHENSIVE      2. Essential hypertension  I10 401.9 lisinopriL (PRINIVIL, ZESTRIL) 40 mg tablet      3. Diabetes mellitus type 2, diet-controlled ([facility])  E11.9 250.00 LIPID PANEL      HEMOGLOBIN A1C WITH EAG      4. B12 deficiency  E53.8 266.2 VITAMIN B12 & FOLATE      5. Tardive dyskinesia  G24.01 333.85       6. Undifferentiated schizophrenia ([facility]  F20.3 295.90       7. Primary osteoarthritis of left knee  M17.12 715.16         Diagnoses and all orders for this visit:    1. Primary hypertension: Our goal is to normalize the blood pressure to decrease the risks of strokes and heart attacks. The patient is in agreement with the plan. -     CBC W/O DIFF; Future  -     METABOLIC PANEL, COMPREHENSIVE; Future    2. Essential hypertension  -     lisinopriL (PRINIVIL, ZESTRIL) 40 mg tablet; Take 1 Tablet by mouth daily.     3. Diabetes mellitus type 2, diet-controlled ([facility]): Discussed with patient today that the goal for their diabetes is to have a HgA1C<7 and ideally as close to 6.5 as possible. We discussed diet and medications. The goal for the cholesterol LDL is less than 70 and HDL>40. Patient is aware of the need for yearly eye exams and to take care of their feet daily. Discussed with patient that blood pressure should be less than 130/80 and watching salt intake is very important.    -     LIPID PANEL; Future  -     HEMOGLOBIN A1C WITH EAG; Future    4. B12 deficiency  -     VITAMIN B12 & FOLATE; Future    5. Tardive dyskinesia    6. Undifferentiated schizophrenia ([address]    7. Primary osteoarthritis of left knee    Follow-up and Dispositions    Return in about 3 months (around 3/19/2023). I have discussed the diagnosis with the patient and the intended plan as seen in the above orders. Social history, medical history, and labs were reviewed. The patient has received an after-visit summary and questions were answered concerning future plans. I have discussed medication side effects and warnings with the patient as well.     MD JOSEFA Kirby & KARSON ORDAZ [facility]  12/19/22

## 2022-12-19 NOTE — PROGRESS NOTES
1. Have you been to the ER, urgent care clinic since your last visit? Hospitalized since your last visit? No    2. Have you seen or consulted any other health care providers outside of the [address] [place] [address] since your last visit? Include any pap smears or colon screening. No  Reviewed record in preparation for visit and have necessary documentation  Pt did not bring medication to office visit for review  opportunity was given for questions      3. For patients aged 39-70: Has the patient had a colonoscopy / FIT/ Cologuard? Yes - no Care Gap present      If the patient is female:    4. For patients aged 41-77: Has the patient had a mammogram within the past 2 years? NA - based on age or sex      11. For patients aged 21-65: Has the patient had a pap smear?  NA - based on age or sex      Goals that were addressed and/or need to be completed during or after this appointment include   Health Maintenance Due   Topic Date Due    DTaP/Tdap/Td series (1 - Tdap) Never done    Shingrix Vaccine Age 49> (1 of 2) Never done    Eye Exam Retinal or Dilated  11/12/2015    COVID-19 Vaccine (3 - Booster for Moderna series) 04/30/2021    Medicare Yearly Exam  12/28/2022

## 2022-12-20 LAB
ALBUMIN SERPL-MCNC: 4 G/DL (ref 3.5–5)
ALBUMIN/GLOB SERPL: 1.3 {RATIO} (ref 1.1–2.2)
ALP SERPL-CCNC: 48 U/L (ref 45–117)
ALT SERPL-CCNC: 13 U/L (ref 12–78)
ANION GAP SERPL CALC-SCNC: 6 MMOL/L (ref 5–15)
AST SERPL-CCNC: 11 U/L (ref 15–37)
BILIRUB SERPL-MCNC: 0.3 MG/DL (ref 0.2–1)
BUN SERPL-MCNC: 12 MG/DL (ref 6–20)
BUN/CREAT SERPL: 12 (ref 12–20)
CALCIUM SERPL-MCNC: 9.6 MG/DL (ref 8.5–10.1)
CHLORIDE SERPL-SCNC: 105 MMOL/L (ref 97–108)
CHOLEST SERPL-MCNC: 172 MG/DL
CO2 SERPL-SCNC: 29 MMOL/L (ref 21–32)
CREAT SERPL-MCNC: 1.04 MG/DL (ref 0.55–1.02)
ERYTHROCYTE [DISTWIDTH] IN BLOOD BY AUTOMATED COUNT: 14.2 % (ref 11.5–14.5)
EST. AVERAGE GLUCOSE BLD GHB EST-MCNC: 123 MG/DL
FOLATE SERPL-MCNC: 17 NG/ML (ref 5–21)
GLOBULIN SER CALC-MCNC: 3 G/DL (ref 2–4)
GLUCOSE SERPL-MCNC: 107 MG/DL (ref 65–100)
HBA1C MFR BLD: 5.9 % (ref 4–5.6)
HCT VFR BLD AUTO: 33.7 % (ref 35–47)
HDLC SERPL-MCNC: 57 MG/DL
HDLC SERPL: 3 {RATIO} (ref 0–5)
HGB BLD-MCNC: 10.1 G/DL (ref 11.5–16)
LDLC SERPL CALC-MCNC: 103 MG/DL (ref 0–100)
MCH RBC QN AUTO: 31 PG (ref 26–34)
MCHC RBC AUTO-ENTMCNC: 30 G/DL (ref 30–36.5)
MCV RBC AUTO: 103.4 FL (ref 80–99)
NRBC # BLD: 0 K/UL (ref 0–0.01)
NRBC BLD-RTO: 0 PER 100 WBC
PLATELET # BLD AUTO: 298 K/UL (ref 150–400)
PMV BLD AUTO: 9.8 FL (ref 8.9–12.9)
POTASSIUM SERPL-SCNC: 4.4 MMOL/L (ref 3.5–5.1)
PROT SERPL-MCNC: 7 G/DL (ref 6.4–8.2)
RBC # BLD AUTO: 3.26 M/UL (ref 3.8–5.2)
SODIUM SERPL-SCNC: 140 MMOL/L (ref 136–145)
TRIGL SERPL-MCNC: 60 MG/DL (ref ?–150)
VIT B12 SERPL-MCNC: 1926 PG/ML (ref 193–986)
VLDLC SERPL CALC-MCNC: 12 MG/DL
WBC # BLD AUTO: 6.6 K/UL (ref 3.6–11)

## 2023-05-22 ENCOUNTER — OFFICE VISIT (OUTPATIENT)
Facility: CLINIC | Age: 78
End: 2023-05-22
Payer: MEDICARE

## 2023-05-22 VITALS
SYSTOLIC BLOOD PRESSURE: 117 MMHG | RESPIRATION RATE: 18 BRPM | BODY MASS INDEX: 36.25 KG/M2 | TEMPERATURE: 98.3 F | DIASTOLIC BLOOD PRESSURE: 68 MMHG | OXYGEN SATURATION: 95 % | HEIGHT: 62 IN | HEART RATE: 83 BPM | WEIGHT: 197 LBS

## 2023-05-22 DIAGNOSIS — F20.9 SCHIZOPHRENIA, UNSPECIFIED TYPE (HCC): ICD-10-CM

## 2023-05-22 DIAGNOSIS — E11.22 TYPE 2 DIABETES MELLITUS WITH CHRONIC KIDNEY DISEASE, WITHOUT LONG-TERM CURRENT USE OF INSULIN, UNSPECIFIED CKD STAGE (HCC): Primary | ICD-10-CM

## 2023-05-22 DIAGNOSIS — E66.01 SEVERE OBESITY (BMI 35.0-39.9) WITH COMORBIDITY (HCC): ICD-10-CM

## 2023-05-22 DIAGNOSIS — E55.9 VITAMIN D DEFICIENCY, UNSPECIFIED: ICD-10-CM

## 2023-05-22 DIAGNOSIS — E53.8 B12 DEFICIENCY: ICD-10-CM

## 2023-05-22 DIAGNOSIS — I10 PRIMARY HYPERTENSION: ICD-10-CM

## 2023-05-22 DIAGNOSIS — G24.01 TARDIVE DYSKINESIA: ICD-10-CM

## 2023-05-22 DIAGNOSIS — E78.2 MIXED HYPERLIPIDEMIA: ICD-10-CM

## 2023-05-22 DIAGNOSIS — E11.9 DIABETES MELLITUS TYPE 2, DIET-CONTROLLED (HCC): ICD-10-CM

## 2023-05-22 PROCEDURE — 99214 OFFICE O/P EST MOD 30 MIN: CPT | Performed by: FAMILY MEDICINE

## 2023-05-22 PROCEDURE — 3074F SYST BP LT 130 MM HG: CPT | Performed by: FAMILY MEDICINE

## 2023-05-22 PROCEDURE — 1123F ACP DISCUSS/DSCN MKR DOCD: CPT | Performed by: FAMILY MEDICINE

## 2023-05-22 PROCEDURE — 3078F DIAST BP <80 MM HG: CPT | Performed by: FAMILY MEDICINE

## 2023-05-22 SDOH — ECONOMIC STABILITY: FOOD INSECURITY: WITHIN THE PAST 12 MONTHS, THE FOOD YOU BOUGHT JUST DIDN'T LAST AND YOU DIDN'T HAVE MONEY TO GET MORE.: NEVER TRUE

## 2023-05-22 SDOH — ECONOMIC STABILITY: HOUSING INSECURITY
IN THE LAST 12 MONTHS, WAS THERE A TIME WHEN YOU DID NOT HAVE A STEADY PLACE TO SLEEP OR SLEPT IN A SHELTER (INCLUDING NOW)?: NO

## 2023-05-22 SDOH — ECONOMIC STABILITY: FOOD INSECURITY: WITHIN THE PAST 12 MONTHS, YOU WORRIED THAT YOUR FOOD WOULD RUN OUT BEFORE YOU GOT MONEY TO BUY MORE.: NEVER TRUE

## 2023-05-22 SDOH — ECONOMIC STABILITY: INCOME INSECURITY: HOW HARD IS IT FOR YOU TO PAY FOR THE VERY BASICS LIKE FOOD, HOUSING, MEDICAL CARE, AND HEATING?: NOT HARD AT ALL

## 2023-05-22 ASSESSMENT — ENCOUNTER SYMPTOMS: CHEST TIGHTNESS: 0

## 2023-05-22 ASSESSMENT — PATIENT HEALTH QUESTIONNAIRE - PHQ9
SUM OF ALL RESPONSES TO PHQ QUESTIONS 1-9: 0
2. FEELING DOWN, DEPRESSED OR HOPELESS: 0
1. LITTLE INTEREST OR PLEASURE IN DOING THINGS: 0
SUM OF ALL RESPONSES TO PHQ QUESTIONS 1-9: 0
SUM OF ALL RESPONSES TO PHQ QUESTIONS 1-9: 0
SUM OF ALL RESPONSES TO PHQ9 QUESTIONS 1 & 2: 0
SUM OF ALL RESPONSES TO PHQ QUESTIONS 1-9: 0

## 2023-05-22 NOTE — PROGRESS NOTES
Chief Complaint   Patient presents with    Follow-up Chronic Condition     1. \"Have you been to the ER, urgent care clinic since your last visit? Hospitalized since your last visit? \" No    2. \"Have you seen or consulted any other health care providers outside of the [address] [place] [address] since your last visit? \" Marylen Pina     3. For patients aged 39-70: Has the patient had a colonoscopy / FIT/ Cologuard? N/A      If the patient is female:    4. For patients aged 41-77: Has the patient had a mammogram within the past 2 years? N/A      5. For patients aged 21-65: Has the patient had a pap smear?  N/A    Health Maintenance Due   Topic Date Due    DTaP/Tdap/Td vaccine (1 - Tdap) Never done    Shingles vaccine (1 of 2) Never done    DEXA (modify frequency per FRAX score)  Never done    Diabetic retinal exam  11/12/2014    COVID-19 Vaccine (3 - Booster for Miguel Alfredo series) 04/30/2021    Annual Wellness Visit (AWV)  12/28/2022    A1C test (Diabetic or Prediabetic)  06/19/2023

## 2023-05-22 NOTE — PROGRESS NOTES
[facility]    History of Present Illness:   Chioma Eric is a 68 y.o. female with history of  HTN, DM, HLD, OA, Schizophrenia  CC: Follow up  History provided by patient and Records    HPI:  Hypertension Follow up: The patient reports:  taking medications as instructed, no medication side effects noted, no TIA's, no chest pain on exertion, no dyspnea on exertion, no swelling of ankles, no orthostatic dizziness or lightheadedness, no orthopnea or paroxysmal nocturnal dyspnea. BP Readings from Last 3 Encounters:   05/22/23 117/68   12/19/22 (!) 140/73   09/22/22 (!) 159/75      Diabetes Follow up: Overall the patient feels well with good energy level. Current Medications: This patient does not have an active medication from one of the medication groupers. .   Insulin dependence: No   Frequency of home glucose testing: PRN   Blood Sugar range at home: <200    Last eye exam: In past 12 months. Last foot exam: This year. Polyuria, polyphagia or polydipsia: No   Retinopathy: No   Neuropathy SX: No   Low blood sugar symptoms: No   Dietary compliance: compliant most of the time   Medication compliance: compliant most of the time   On ASA: No   Tobacco Use: No   Depression: No     Wt Readings from Last 3 Encounters:   05/22/23 197 lb (89.4 kg)   12/19/22 193 lb (87.5 kg)   09/22/22 199 lb (90.3 kg)        Lab Results   Component Value Date/Time    WNP9ACHX 5.5 10/30/2019 03:27 PM      No results found for: MCACR, MCA2, MCAU, MCAU2    No results found for: FRAN, CREAPOC, CREA      Schizophrenia: [facility], patient noting she is hearing voices, but they are not saying anything distressing currently. [facility] is aware.       Health Maintenance  Health Maintenance Due   Topic Date Due    Diabetic retinal exam  11/12/2014    COVID-19 Vaccine (3 - Booster for Moderna series) 04/30/2021    Annual Wellness Visit (AWV)  12/28/2022    A1C test (Diabetic or Prediabetic)  06/19/2023

## 2023-05-23 LAB
ALBUMIN SERPL-MCNC: 4.2 G/DL (ref 3.5–5)
ALBUMIN/GLOB SERPL: 1.3 (ref 1.1–2.2)
ALP SERPL-CCNC: 53 U/L (ref 45–117)
ALT SERPL-CCNC: 18 U/L (ref 12–78)
ANION GAP SERPL CALC-SCNC: 5 MMOL/L (ref 5–15)
AST SERPL-CCNC: 10 U/L (ref 15–37)
BILIRUB SERPL-MCNC: 0.3 MG/DL (ref 0.2–1)
BUN SERPL-MCNC: 21 MG/DL (ref 6–20)
BUN/CREAT SERPL: 21 (ref 12–20)
CALCIUM SERPL-MCNC: 9.5 MG/DL (ref 8.5–10.1)
CHLORIDE SERPL-SCNC: 108 MMOL/L (ref 97–108)
CHOLEST SERPL-MCNC: 189 MG/DL
CO2 SERPL-SCNC: 27 MMOL/L (ref 21–32)
CREAT SERPL-MCNC: 1.02 MG/DL (ref 0.55–1.02)
CREAT UR-MCNC: 105 MG/DL
ERYTHROCYTE [DISTWIDTH] IN BLOOD BY AUTOMATED COUNT: 13.9 % (ref 11.5–14.5)
EST. AVERAGE GLUCOSE BLD GHB EST-MCNC: 114 MG/DL
GLOBULIN SER CALC-MCNC: 3.3 G/DL (ref 2–4)
GLUCOSE SERPL-MCNC: 108 MG/DL (ref 65–100)
HBA1C MFR BLD: 5.6 % (ref 4–5.6)
HCT VFR BLD AUTO: 35.6 % (ref 35–47)
HDLC SERPL-MCNC: 68 MG/DL
HDLC SERPL: 2.8 (ref 0–5)
HGB BLD-MCNC: 10.8 G/DL (ref 11.5–16)
LDLC SERPL CALC-MCNC: 110.6 MG/DL (ref 0–100)
MCH RBC QN AUTO: 30.4 PG (ref 26–34)
MCHC RBC AUTO-ENTMCNC: 30.3 G/DL (ref 30–36.5)
MCV RBC AUTO: 100.3 FL (ref 80–99)
MICROALBUMIN UR-MCNC: 4.08 MG/DL
MICROALBUMIN/CREAT UR-RTO: 39 MG/G (ref 0–30)
NRBC # BLD: 0 K/UL (ref 0–0.01)
NRBC BLD-RTO: 0 PER 100 WBC
PLATELET # BLD AUTO: 292 K/UL (ref 150–400)
PMV BLD AUTO: 10.1 FL (ref 8.9–12.9)
POTASSIUM SERPL-SCNC: 4 MMOL/L (ref 3.5–5.1)
PROT SERPL-MCNC: 7.5 G/DL (ref 6.4–8.2)
RBC # BLD AUTO: 3.55 M/UL (ref 3.8–5.2)
SODIUM SERPL-SCNC: 140 MMOL/L (ref 136–145)
TRIGL SERPL-MCNC: 52 MG/DL
VIT B12 SERPL-MCNC: 1773 PG/ML (ref 193–986)
VLDLC SERPL CALC-MCNC: 10.4 MG/DL
WBC # BLD AUTO: 7.4 K/UL (ref 3.6–11)

## 2023-05-24 LAB — 25(OH)D3 SERPL-MCNC: 26.8 NG/ML (ref 30–100)

## 2023-06-19 RX ORDER — LISINOPRIL 40 MG/1
TABLET ORAL
Qty: 90 TABLET | Refills: 1 | Status: SHIPPED | OUTPATIENT
Start: 2023-06-19

## 2023-07-11 RX ORDER — POLYETHYLENE GLYCOL 3350 17 G/17G
POWDER, FOR SOLUTION ORAL
Qty: 30 EACH | Refills: 2 | Status: SHIPPED | OUTPATIENT
Start: 2023-07-11

## 2023-08-01 RX ORDER — ALBUTEROL SULFATE 2.5 MG/3ML
SOLUTION RESPIRATORY (INHALATION)
Qty: 75 ML | Refills: 1 | Status: SHIPPED | OUTPATIENT
Start: 2023-08-01

## 2023-08-31 ENCOUNTER — OFFICE VISIT (OUTPATIENT)
Facility: CLINIC | Age: 78
End: 2023-08-31
Payer: MEDICARE

## 2023-08-31 VITALS
SYSTOLIC BLOOD PRESSURE: 158 MMHG | BODY MASS INDEX: 36.25 KG/M2 | OXYGEN SATURATION: 99 % | DIASTOLIC BLOOD PRESSURE: 87 MMHG | HEART RATE: 77 BPM | WEIGHT: 197 LBS | RESPIRATION RATE: 20 BRPM | TEMPERATURE: 98.5 F | HEIGHT: 62 IN

## 2023-08-31 DIAGNOSIS — E11.9 DIABETES MELLITUS TYPE 2, DIET-CONTROLLED (HCC): ICD-10-CM

## 2023-08-31 DIAGNOSIS — E11.21 TYPE 2 DIABETES WITH NEPHROPATHY (HCC): ICD-10-CM

## 2023-08-31 DIAGNOSIS — E11.22 TYPE 2 DIABETES MELLITUS WITH CHRONIC KIDNEY DISEASE, WITHOUT LONG-TERM CURRENT USE OF INSULIN, UNSPECIFIED CKD STAGE (HCC): ICD-10-CM

## 2023-08-31 DIAGNOSIS — I10 PRIMARY HYPERTENSION: Primary | ICD-10-CM

## 2023-08-31 DIAGNOSIS — J45.20 MILD INTERMITTENT ASTHMA WITHOUT COMPLICATION: ICD-10-CM

## 2023-08-31 DIAGNOSIS — M17.12 PRIMARY OSTEOARTHRITIS OF LEFT KNEE: ICD-10-CM

## 2023-08-31 DIAGNOSIS — E66.01 SEVERE OBESITY (BMI 35.0-39.9) WITH COMORBIDITY (HCC): ICD-10-CM

## 2023-08-31 DIAGNOSIS — F20.9 SCHIZOPHRENIA, UNSPECIFIED TYPE (HCC): ICD-10-CM

## 2023-08-31 PROCEDURE — 3077F SYST BP >= 140 MM HG: CPT | Performed by: FAMILY MEDICINE

## 2023-08-31 PROCEDURE — 3044F HG A1C LEVEL LT 7.0%: CPT | Performed by: FAMILY MEDICINE

## 2023-08-31 PROCEDURE — 99214 OFFICE O/P EST MOD 30 MIN: CPT | Performed by: FAMILY MEDICINE

## 2023-08-31 PROCEDURE — 1123F ACP DISCUSS/DSCN MKR DOCD: CPT | Performed by: FAMILY MEDICINE

## 2023-08-31 PROCEDURE — 3079F DIAST BP 80-89 MM HG: CPT | Performed by: FAMILY MEDICINE

## 2023-08-31 RX ORDER — ALBUTEROL SULFATE 90 UG/1
AEROSOL, METERED RESPIRATORY (INHALATION)
Qty: 18 G | Refills: 1 | Status: SHIPPED | OUTPATIENT
Start: 2023-08-31

## 2023-08-31 ASSESSMENT — ENCOUNTER SYMPTOMS: ABDOMINAL PAIN: 0

## 2023-08-31 NOTE — PROGRESS NOTES
[facility]    History of Present Illness:   Meryle Relic is a 66 y.o. female with history of HTN, DM, HLD, OA, Schizophrenia  CC: Follow up  History provided by patient and Records    HPI:  Hypertension Follow up: The patient reports:  taking medications as instructed, no medication side effects noted, no TIA's, no chest pain on exertion, no dyspnea on exertion, no swelling of ankles. BP Readings from Last 3 Encounters:   08/31/23 (!) 158/87   05/22/23 117/68   12/19/22 (!) 140/73      Diabetes Follow up: Overall the patient feels well with good energy level. Current Medications: This patient does not have an active medication from one of the medication groupers. .   Insulin dependence: No              Frequency of home glucose testing: PRN              Blood Sugar range at home: <200                Last eye exam: In past 12 months. Last foot exam: This year. Polyuria, polyphagia or polydipsia: No              Retinopathy: No              Neuropathy SX: No              Low blood sugar symptoms: No              Dietary compliance: compliant most of the time              Medication compliance: compliant most of the time              On ASA: No              Tobacco Use: No              Depression: No     Wt Readings from Last 3 Encounters:   08/31/23 197 lb (89.4 kg)   05/22/23 197 lb (89.4 kg)   12/19/22 193 lb (87.5 kg)        Lab Results   Component Value Date/Time    WGB1MJWF 5.5 10/30/2019 03:27 PM      No results found for: MCA2, MCAU, MCAU2    No results found for: FRAN, CREAPOC, CREA      Schizophrenia: Seeing [facility], patient noting Denies any voices at this time. Jordan Benavides is aware. Asthma: Stable at this time but needs a refill on her inhaler.     Health Maintenance  Health Maintenance Due   Topic Date Due    Diabetic retinal exam  11/12/2014    COVID-19 Vaccine (3 - Booster for  Ross series) 04/30/2021    Annual Wellness Visit (AWV)

## 2023-09-20 ENCOUNTER — IMMUNIZATION (OUTPATIENT)
Facility: CLINIC | Age: 78
End: 2023-09-20
Payer: MEDICARE

## 2023-09-20 VITALS
OXYGEN SATURATION: 98 % | DIASTOLIC BLOOD PRESSURE: 79 MMHG | HEART RATE: 74 BPM | TEMPERATURE: 97.8 F | SYSTOLIC BLOOD PRESSURE: 169 MMHG

## 2023-09-20 DIAGNOSIS — Z23 ENCOUNTER FOR IMMUNIZATION: Primary | ICD-10-CM

## 2023-09-20 PROCEDURE — G0008 ADMIN INFLUENZA VIRUS VAC: HCPCS | Performed by: FAMILY MEDICINE

## 2023-09-20 PROCEDURE — 90694 VACC AIIV4 NO PRSRV 0.5ML IM: CPT | Performed by: FAMILY MEDICINE

## 2023-09-20 NOTE — PROGRESS NOTES
Patient here for flu shot only. Denies any recent illnesses. VIS provided. BP elevated this visit. Denies sx. Reports she has not yet taken her medications this morning and is nervous about shot/doctor's office. PCP notified.

## 2023-12-08 RX ORDER — LISINOPRIL 40 MG/1
TABLET ORAL
Qty: 90 TABLET | Refills: 0 | Status: SHIPPED | OUTPATIENT
Start: 2023-12-08

## 2024-01-10 ENCOUNTER — OFFICE VISIT (OUTPATIENT)
Facility: CLINIC | Age: 79
End: 2024-01-10
Payer: MEDICARE

## 2024-01-10 VITALS
OXYGEN SATURATION: 98 % | BODY MASS INDEX: 37.28 KG/M2 | DIASTOLIC BLOOD PRESSURE: 68 MMHG | WEIGHT: 202.6 LBS | HEIGHT: 62 IN | TEMPERATURE: 97.7 F | RESPIRATION RATE: 18 BRPM | SYSTOLIC BLOOD PRESSURE: 133 MMHG | HEART RATE: 87 BPM

## 2024-01-10 DIAGNOSIS — Z00.00 MEDICARE ANNUAL WELLNESS VISIT, SUBSEQUENT: Primary | ICD-10-CM

## 2024-01-10 DIAGNOSIS — E55.9 VITAMIN D DEFICIENCY, UNSPECIFIED: ICD-10-CM

## 2024-01-10 DIAGNOSIS — E53.8 B12 DEFICIENCY: ICD-10-CM

## 2024-01-10 DIAGNOSIS — E11.9 DIABETES MELLITUS TYPE 2, DIET-CONTROLLED (HCC): ICD-10-CM

## 2024-01-10 DIAGNOSIS — G24.01 TARDIVE DYSKINESIA: ICD-10-CM

## 2024-01-10 DIAGNOSIS — E11.22 TYPE 2 DIABETES MELLITUS WITH CHRONIC KIDNEY DISEASE, WITHOUT LONG-TERM CURRENT USE OF INSULIN, UNSPECIFIED CKD STAGE (HCC): ICD-10-CM

## 2024-01-10 DIAGNOSIS — F20.9 SCHIZOPHRENIA, UNSPECIFIED TYPE (HCC): ICD-10-CM

## 2024-01-10 DIAGNOSIS — I10 PRIMARY HYPERTENSION: ICD-10-CM

## 2024-01-10 DIAGNOSIS — F79 INTELLECTUAL DISABILITY: ICD-10-CM

## 2024-01-10 DIAGNOSIS — E78.2 MIXED HYPERLIPIDEMIA: ICD-10-CM

## 2024-01-10 LAB
ERYTHROCYTE [DISTWIDTH] IN BLOOD BY AUTOMATED COUNT: 15.6 % (ref 11.5–14.5)
HCT VFR BLD AUTO: 33.4 % (ref 35–47)
HGB BLD-MCNC: 10.3 G/DL (ref 11.5–16)
MCH RBC QN AUTO: 30.1 PG (ref 26–34)
MCHC RBC AUTO-ENTMCNC: 30.8 G/DL (ref 30–36.5)
MCV RBC AUTO: 97.7 FL (ref 80–99)
NRBC # BLD: 0 K/UL (ref 0–0.01)
NRBC BLD-RTO: 0 PER 100 WBC
PLATELET # BLD AUTO: 285 K/UL (ref 150–400)
PMV BLD AUTO: 10.3 FL (ref 8.9–12.9)
RBC # BLD AUTO: 3.42 M/UL (ref 3.8–5.2)
WBC # BLD AUTO: 7 K/UL (ref 3.6–11)

## 2024-01-10 PROCEDURE — 1123F ACP DISCUSS/DSCN MKR DOCD: CPT | Performed by: FAMILY MEDICINE

## 2024-01-10 PROCEDURE — 99214 OFFICE O/P EST MOD 30 MIN: CPT | Performed by: FAMILY MEDICINE

## 2024-01-10 PROCEDURE — 3078F DIAST BP <80 MM HG: CPT | Performed by: FAMILY MEDICINE

## 2024-01-10 PROCEDURE — 3075F SYST BP GE 130 - 139MM HG: CPT | Performed by: FAMILY MEDICINE

## 2024-01-10 PROCEDURE — G0439 PPPS, SUBSEQ VISIT: HCPCS | Performed by: FAMILY MEDICINE

## 2024-01-10 RX ORDER — OLANZAPINE 2.5 MG/1
2.5 TABLET, FILM COATED ORAL EVERY MORNING
COMMUNITY
Start: 2024-01-03

## 2024-01-10 ASSESSMENT — ENCOUNTER SYMPTOMS
CHEST TIGHTNESS: 0
ABDOMINAL PAIN: 0

## 2024-01-10 ASSESSMENT — LIFESTYLE VARIABLES
HOW OFTEN DO YOU HAVE A DRINK CONTAINING ALCOHOL: NEVER
HOW MANY STANDARD DRINKS CONTAINING ALCOHOL DO YOU HAVE ON A TYPICAL DAY: PATIENT DOES NOT DRINK

## 2024-01-10 ASSESSMENT — PATIENT HEALTH QUESTIONNAIRE - PHQ9
SUM OF ALL RESPONSES TO PHQ QUESTIONS 1-9: 0
1. LITTLE INTEREST OR PLEASURE IN DOING THINGS: 0
SUM OF ALL RESPONSES TO PHQ QUESTIONS 1-9: 0
SUM OF ALL RESPONSES TO PHQ9 QUESTIONS 1 & 2: 0
2. FEELING DOWN, DEPRESSED OR HOPELESS: 0

## 2024-01-10 NOTE — PATIENT INSTRUCTIONS
stop and talk to your doctor.  Where can you learn more?  Go to https://[email or web address].net/patientEd and enter P600 to learn more about \"Learning About Being Active as an Older Adult.\"  Current as of: June 6, 2023               Content Version: 13.9  © 8557-2828 [employer].   Care instructions adapted under license by [employer]. If you have questions about a medical condition or this instruction, always ask your healthcare professional. [employer] disclaims any warranty or liability for your use of this information.           Learning About Dental Care for Older Adults  Dental care for older adults: Overview  Dental care for older people is much the same as for younger adults. But older adults do have concerns that younger adults do not. Older adults may have problems with gum disease and decay on the roots of their teeth. They may need missing teeth replaced or broken fillings fixed. Or they may have dentures that need to be cared for. Some older adults may have trouble holding a toothbrush.  You can help remind the person you are caring for to brush and floss their teeth or to clean their dentures. In some cases, you may need to do the brushing and other dental care tasks. People who have trouble using their hands or who have dementia may need this extra help.  How can you help with dental care?  Normal dental care  To keep the teeth and gums healthy:  Brush the teeth with fluoride toothpaste twice a day--in the morning and at night--and floss at least once a day. Plaque can quickly build up on the teeth of older adults.  Watch for the signs of gum disease. These signs include gums that bleed after brushing or after eating hard foods, such as apples.  See a dentist regularly. Many experts recommend checkups every 6 months.  Keep the dentist up to date on any new medications the person is taking.  Encourage a balanced diet that includes whole grains, vegetables, and fruits, and that

## 2024-01-10 NOTE — PROGRESS NOTES
1. \"Have you been to the ER, urgent care clinic since your last visit?  Hospitalized since your last visit?\" no    2. \"Have you seen or consulted any other health care providers outside of the [facility] since your last visit?\" [facility]     .  Health Maintenance Due   Topic Date Due    Respiratory Syncytial Virus (RSV) Pregnant or age 60 yrs+ (1 - 1-dose 60+ series) Never done    Diabetic retinal exam  11/12/2014    COVID-19 Vaccine (3 - 2023-24 season) 09/01/2023    A1C test (Diabetic or Prediabetic)  11/22/2023    Annual Wellness Visit (Medicare Advantage)  Never done      
[facility]    History of Present Illness:   Rody Martins is a 78 y.o. female with history of  HTN, DM, HLD, OA, Schizophrenia   CC: Medicare wellness, Follow up   History provided by patient and Records    HPI:  Hypertension Follow up:  The patient reports:  taking medications as instructed, no medication side effects noted, no TIA's, no chest pain on exertion, no dyspnea on exertion, no swelling of ankles, no orthostatic dizziness or lightheadedness, no orthopnea or paroxysmal nocturnal dyspnea.     BP Readings from Last 3 Encounters:   01/10/24 133/68   09/20/23 (!) 169/79   08/31/23 (!) 158/87      Schizophrenia: Seeing [facility], patient noting does hear some voices at this time, but denies commands. Esteban is aware, seen 2 months ago.     Asthma: Stable at this time but needs a refill on her inhaler.    Diabetes Follow up: Overall the patient feels well with good energy level.     Current Medications: This patient does not have an active medication from one of the medication groupers..   Insulin dependence: No              Frequency of home glucose testing: PRN              Blood Sugar range at home: <200                Last eye exam: In past 12 months.              Last foot exam: This year.              Polyuria, polyphagia or polydipsia: No              Retinopathy: No              Neuropathy SX: No              Low blood sugar symptoms: No              Dietary compliance: compliant most of the time              Medication compliance: compliant most of the time              On ASA: No              Tobacco Use: No              Depression: No     Wt Readings from Last 3 Encounters:   01/10/24 91.9 kg (202 lb 9.6 oz)   08/31/23 89.4 kg (197 lb)   05/22/23 89.4 kg (197 lb)        Lab Results   Component Value Date/Time    RSG7MEMR 5.5 10/30/2019 03:27 PM     Hemoglobin A1C   Date Value Ref Range Status   05/22/2023 5.6 4.0 - 5.6 % Final     Comment:     NEW METHOD  PLEASE NOTE NEW 
  Vision Screen:  Do you have difficulty driving, watching TV, or doing any of your daily activities because of your eyesight?: (!) Yes  Have you had an eye exam within the past year?: (!) No  No results found.    Interventions:   Patient encouraged to make appointment with their eye specialist    Safety:  Do you have non-slip mats or non-slip surfaces or shower bars or grab bars in your shower or bathtub?: (!) No  Interventions:  Discussed     Advanced Directives:  Do you have a Living Will?: (!) No    Intervention:  has NO advanced directive - information provided       Objective   Vitals:    01/10/24 0927   BP: 133/68   Site: Right Upper Arm   Position: Sitting   Cuff Size: Large Adult   Pulse: 87   Resp: 18   Temp: 97.7 °F (36.5 °C)   TempSrc: Temporal   SpO2: 98%   Weight: 91.9 kg (202 lb 9.6 oz)   Height: 1.575 m (5' 2\")      Body mass index is 37.06 kg/m².     Allergies   Allergen Reactions    Sulfamethoxazole-Trimethoprim Nausea Only     Prior to Visit Medications    Medication Sig Taking? Authorizing Provider   OLANZapine (ZYPREXA) 2.5 MG tablet Take 1 tablet by mouth every morning Yes Provider, MD Aries   lisinopril (PRINIVIL;ZESTRIL) 40 MG tablet TAKE ONE TABLET BY MOUTH ONCE A DAY Yes Boo Winston MD   albuterol sulfate HFA (PROVENTIL;VENTOLIN;PROAIR) 108 (90 Base) MCG/ACT inhaler INHALE 2 PUFFS BY MOUTH EVERY 6 HOURS AS NEEDED FOR WHEEZING Yes Boo Winston MD   albuterol (PROVENTIL) (2.5 MG/3ML) 0.083% nebulizer solution INHALE 3 ML VIA NEBULIZER EVERY 4 HOURS AS NEEDED FOR WHEEZING Yes Boo Winston MD   HEALTHYLAX 17 g packet MIX 1 PACKET IN LIQUID ONCE DAILY Yes Boo Winston MD   aspirin 81 MG EC tablet Take 1 tablet by mouth Yes Automatic Reconciliation, Ar   Calcium Carbonate-Vitamin D 600-5 MG-MCG TABS Take 1 tablet by mouth daily Yes Automatic Reconciliation, Ar   cyanocobalamin 500 MCG tablet Take 1 tablet by mouth daily Yes Automatic Reconciliation, Ar

## 2024-01-11 LAB
25(OH)D3 SERPL-MCNC: 31.6 NG/ML (ref 30–100)
ALBUMIN SERPL-MCNC: 4 G/DL (ref 3.5–5)
ALBUMIN/GLOB SERPL: 1.2 (ref 1.1–2.2)
ALP SERPL-CCNC: 57 U/L (ref 45–117)
ALT SERPL-CCNC: 16 U/L (ref 12–78)
ANION GAP SERPL CALC-SCNC: 4 MMOL/L (ref 5–15)
AST SERPL-CCNC: 13 U/L (ref 15–37)
BILIRUB SERPL-MCNC: 0.3 MG/DL (ref 0.2–1)
BUN SERPL-MCNC: 22 MG/DL (ref 6–20)
BUN/CREAT SERPL: 18 (ref 12–20)
CALCIUM SERPL-MCNC: 8.6 MG/DL (ref 8.5–10.1)
CHLORIDE SERPL-SCNC: 106 MMOL/L (ref 97–108)
CHOLEST SERPL-MCNC: 174 MG/DL
CO2 SERPL-SCNC: 27 MMOL/L (ref 21–32)
CREAT SERPL-MCNC: 1.19 MG/DL (ref 0.55–1.02)
CREAT UR-MCNC: 26.1 MG/DL
EST. AVERAGE GLUCOSE BLD GHB EST-MCNC: 117 MG/DL
GLOBULIN SER CALC-MCNC: 3.3 G/DL (ref 2–4)
GLUCOSE SERPL-MCNC: 100 MG/DL (ref 65–100)
HBA1C MFR BLD: 5.7 % (ref 4–5.6)
HDLC SERPL-MCNC: 62 MG/DL
HDLC SERPL: 2.8 (ref 0–5)
LDLC SERPL CALC-MCNC: 96 MG/DL (ref 0–100)
MICROALBUMIN UR-MCNC: 0.89 MG/DL
MICROALBUMIN/CREAT UR-RTO: 34 MG/G (ref 0–30)
POTASSIUM SERPL-SCNC: 4.3 MMOL/L (ref 3.5–5.1)
PROT SERPL-MCNC: 7.3 G/DL (ref 6.4–8.2)
SODIUM SERPL-SCNC: 137 MMOL/L (ref 136–145)
TRIGL SERPL-MCNC: 80 MG/DL
VIT B12 SERPL-MCNC: >2000 PG/ML (ref 193–986)
VLDLC SERPL CALC-MCNC: 16 MG/DL

## 2024-02-20 ENCOUNTER — OFFICE VISIT (OUTPATIENT)
Facility: CLINIC | Age: 79
End: 2024-02-20
Payer: MEDICARE

## 2024-02-20 VITALS
OXYGEN SATURATION: 99 % | DIASTOLIC BLOOD PRESSURE: 79 MMHG | RESPIRATION RATE: 18 BRPM | TEMPERATURE: 98 F | WEIGHT: 199 LBS | BODY MASS INDEX: 36.62 KG/M2 | HEART RATE: 78 BPM | SYSTOLIC BLOOD PRESSURE: 145 MMHG | HEIGHT: 62 IN

## 2024-02-20 DIAGNOSIS — I10 ESSENTIAL (PRIMARY) HYPERTENSION: ICD-10-CM

## 2024-02-20 DIAGNOSIS — R26.89 BALANCE PROBLEM: ICD-10-CM

## 2024-02-20 DIAGNOSIS — H26.9 CATARACT OF BOTH EYES, UNSPECIFIED CATARACT TYPE: ICD-10-CM

## 2024-02-20 DIAGNOSIS — H54.7 DECREASED VISUAL ACUITY: ICD-10-CM

## 2024-02-20 DIAGNOSIS — S09.90XA INJURY OF HEAD, INITIAL ENCOUNTER: Primary | ICD-10-CM

## 2024-02-20 PROCEDURE — 3077F SYST BP >= 140 MM HG: CPT | Performed by: FAMILY MEDICINE

## 2024-02-20 PROCEDURE — 3078F DIAST BP <80 MM HG: CPT | Performed by: FAMILY MEDICINE

## 2024-02-20 PROCEDURE — 1123F ACP DISCUSS/DSCN MKR DOCD: CPT | Performed by: FAMILY MEDICINE

## 2024-02-20 PROCEDURE — 99214 OFFICE O/P EST MOD 30 MIN: CPT | Performed by: FAMILY MEDICINE

## 2024-02-20 NOTE — PROGRESS NOTES
Chief Complaint   Patient presents with    Head Injury     Dropped cane and reached over to , striking left side of head on door knob. 2/16/24. Denies pain. Fading bruise beneath left eye.       History of Present Illness:  Rody Martins is a 78 y.o. female w/ PMHx of HTN, DM2, tardive dyskinesia, schizophrenia, knee osteoarthritis, obesity who presents to clinic for evaluation of facial pain and swelling after sustaining GLF.  - Pt bent over to  cane and hit head on door knob.  - Hit on medial left eyebrow.  - Developed black eye the next day.  - Used ice pack.  - Denies having any pain, vision changes. Denies weakness or numbness.  - Lives alone.  - Daughter and granddaughter live down the street. Daughter visits pt daily.  - Pt denies falling in the past several years.    HTN:  - Pt prescribed lisinopril 40 mg daily.  - Does not have BP machine at home.  - Denies chest pain or SOB.  - Creatinine 1.19 on 1/10/24. eGFR 47.          Past Medical History:   Diagnosis Date    Hypertension     Psychotic disorder (HCC)        Current Outpatient Medications   Medication Sig Dispense Refill    OLANZapine (ZYPREXA) 2.5 MG tablet Take 1 tablet by mouth every morning      lisinopril (PRINIVIL;ZESTRIL) 40 MG tablet TAKE ONE TABLET BY MOUTH ONCE A DAY 90 tablet 0    albuterol sulfate HFA (PROVENTIL;VENTOLIN;PROAIR) 108 (90 Base) MCG/ACT inhaler INHALE 2 PUFFS BY MOUTH EVERY 6 HOURS AS NEEDED FOR WHEEZING 18 g 1    albuterol (PROVENTIL) (2.5 MG/3ML) 0.083% nebulizer solution INHALE 3 ML VIA NEBULIZER EVERY 4 HOURS AS NEEDED FOR WHEEZING 75 mL 1    HEALTHYLAX 17 g packet MIX 1 PACKET IN LIQUID ONCE DAILY (Patient taking differently: Take 1 packet by mouth daily as needed) 30 each 2    aspirin 81 MG EC tablet Take 1 tablet by mouth      Calcium Carbonate-Vitamin D 600-5 MG-MCG TABS Take 1 tablet by mouth daily      cyanocobalamin 500 MCG tablet Take 1 tablet by mouth daily      docusate (COLACE, DULCOLAX) 100 MG

## 2024-03-14 ENCOUNTER — OFFICE VISIT (OUTPATIENT)
Facility: CLINIC | Age: 79
End: 2024-03-14
Payer: MEDICARE

## 2024-03-14 VITALS
OXYGEN SATURATION: 99 % | BODY MASS INDEX: 37.36 KG/M2 | RESPIRATION RATE: 20 BRPM | WEIGHT: 203 LBS | TEMPERATURE: 98.6 F | SYSTOLIC BLOOD PRESSURE: 134 MMHG | HEART RATE: 71 BPM | DIASTOLIC BLOOD PRESSURE: 67 MMHG | HEIGHT: 62 IN

## 2024-03-14 DIAGNOSIS — I10 PRIMARY HYPERTENSION: ICD-10-CM

## 2024-03-14 DIAGNOSIS — E11.9 DIABETES MELLITUS TYPE 2, DIET-CONTROLLED (HCC): Primary | ICD-10-CM

## 2024-03-14 DIAGNOSIS — Z91.09 ENVIRONMENTAL ALLERGIES: ICD-10-CM

## 2024-03-14 PROCEDURE — 99214 OFFICE O/P EST MOD 30 MIN: CPT | Performed by: FAMILY MEDICINE

## 2024-03-14 PROCEDURE — 3078F DIAST BP <80 MM HG: CPT | Performed by: FAMILY MEDICINE

## 2024-03-14 PROCEDURE — 3044F HG A1C LEVEL LT 7.0%: CPT | Performed by: FAMILY MEDICINE

## 2024-03-14 PROCEDURE — 1123F ACP DISCUSS/DSCN MKR DOCD: CPT | Performed by: FAMILY MEDICINE

## 2024-03-14 PROCEDURE — 3075F SYST BP GE 130 - 139MM HG: CPT | Performed by: FAMILY MEDICINE

## 2024-03-14 RX ORDER — FEXOFENADINE HCL 180 MG/1
180 TABLET ORAL DAILY
Qty: 30 TABLET | Refills: 0 | Status: SHIPPED | OUTPATIENT
Start: 2024-03-14 | End: 2024-04-13

## 2024-03-14 RX ORDER — FLUTICASONE PROPIONATE 50 MCG
1 SPRAY, SUSPENSION (ML) NASAL DAILY
Qty: 16 G | Refills: 2 | Status: SHIPPED | OUTPATIENT
Start: 2024-03-14

## 2024-03-14 ASSESSMENT — ENCOUNTER SYMPTOMS
SINUS PAIN: 0
SINUS PRESSURE: 0
COUGH: 1
WHEEZING: 1

## 2024-03-14 NOTE — PROGRESS NOTES
Chief Complaint   Patient presents with    Follow-up Chronic Condition     Blood pressure recheck    Cough     Reports intermittently. Reports with wheezing and dyspnea on exertion.         \"Have you been to the ER, urgent care clinic since your last visit?  Hospitalized since your last visit?\"    NO    “Have you seen or consulted any other health care providers outside of [facility] since your last visit?”    NO               Health Maintenance Due   Topic Date Due    Respiratory Syncytial Virus (RSV) Pregnant or age 60 yrs+ (1 - 1-dose 60+ series) Never done    Diabetic retinal exam  11/12/2014    COVID-19 Vaccine (3 - 2023-24 season) 09/01/2023       
effects and warnings with the patient as well.    Boo Winston MD  [facility]  03/14/24

## 2024-03-22 DIAGNOSIS — I10 PRIMARY HYPERTENSION: Primary | ICD-10-CM

## 2024-03-22 RX ORDER — LISINOPRIL 40 MG/1
TABLET ORAL
Qty: 90 TABLET | Refills: 1 | Status: SHIPPED | OUTPATIENT
Start: 2024-03-22

## 2024-04-29 ENCOUNTER — OFFICE VISIT (OUTPATIENT)
Facility: CLINIC | Age: 79
End: 2024-04-29
Payer: MEDICARE

## 2024-04-29 VITALS
OXYGEN SATURATION: 99 % | SYSTOLIC BLOOD PRESSURE: 136 MMHG | HEIGHT: 62 IN | HEART RATE: 89 BPM | BODY MASS INDEX: 37.54 KG/M2 | DIASTOLIC BLOOD PRESSURE: 81 MMHG | WEIGHT: 204 LBS | RESPIRATION RATE: 18 BRPM | TEMPERATURE: 98.8 F

## 2024-04-29 DIAGNOSIS — M17.12 PRIMARY OSTEOARTHRITIS OF LEFT KNEE: Primary | ICD-10-CM

## 2024-04-29 PROCEDURE — 99213 OFFICE O/P EST LOW 20 MIN: CPT

## 2024-04-29 PROCEDURE — 3079F DIAST BP 80-89 MM HG: CPT

## 2024-04-29 PROCEDURE — 1123F ACP DISCUSS/DSCN MKR DOCD: CPT

## 2024-04-29 PROCEDURE — 3075F SYST BP GE 130 - 139MM HG: CPT

## 2024-04-30 NOTE — PROGRESS NOTES
Chief Complaint   Patient presents with    Joint Swelling    Leg Swelling     L. Leg swelling x a couple weeks       \"Have you been to the ER, urgent care clinic since your last visit?  Hospitalized since your last visit?\"    NO    “Have you seen or consulted any other health care providers outside of [facility] since your last visit?”    NO              Health Maintenance Due   Topic Date Due    Respiratory Syncytial Virus (RSV) Pregnant or age 60 yrs+ (1 - 1-dose 60+ series) Never done    Diabetic retinal exam  11/12/2014    COVID-19 Vaccine (3 - 2023-24 season) 09/01/2023        
I reviewed with the resident the medical history and the resident's findings on the physical examination.  I discussed with the resident the patient's diagnosis and concur with the plan.     Cara Arias MD 4/30/2024   
appreciable joint effusion on exam today swelling does not go into calf and there is no calf tenderness so no suspicion for DVT at this time.  No signs concerning for an infected joint.  There is potential for a Baker's cyst given she mentions that she does have intermittent tenderness at her posterior knee and effusion posteriorly as well.  She does have significant joint deformity in the setting of her osteoarthritis.  She uses Tylenol and Voltaren gel for pain relief, but is interested in a corticosteroid injection for additional pain relief.  Will send message to PCP for potential CSI here in the office but given her deformity may benefit from seeing orthopedics again for potential ultrasound-guided injection.    Follow up: PRN     Diagnosis Orders   1. Primary osteoarthritis of left knee            Patient's care was discussed with Dr. Arias.    Modesta Vogt DO  Family Medicine Resident    Patient's past medical history, including but not limited to problem list, allergies, medications, social history, family history, and surgical history reviewed. I have reviewed pertinent labs results and other data. We discussed the expected course, resolution and complications of the diagnosis(es) in detail.  Medication risks, benefits, costs, interactions, and alternatives were discussed as indicated.  I advised her to contact the office if her condition worsens, changes or fails to improve as anticipated. She expressed understanding with the diagnosis(es) and plan.

## 2024-05-24 ENCOUNTER — PROCEDURE VISIT (OUTPATIENT)
Facility: CLINIC | Age: 79
End: 2024-05-24

## 2024-05-24 VITALS
BODY MASS INDEX: 37.73 KG/M2 | SYSTOLIC BLOOD PRESSURE: 164 MMHG | HEIGHT: 62 IN | HEART RATE: 81 BPM | WEIGHT: 205 LBS | RESPIRATION RATE: 16 BRPM | TEMPERATURE: 97.6 F | DIASTOLIC BLOOD PRESSURE: 77 MMHG

## 2024-05-24 DIAGNOSIS — I10 PRIMARY HYPERTENSION: ICD-10-CM

## 2024-05-24 DIAGNOSIS — E11.9 DIABETES MELLITUS TYPE 2, DIET-CONTROLLED (HCC): ICD-10-CM

## 2024-05-24 DIAGNOSIS — I87.2 VENOUS INSUFFICIENCY: Primary | ICD-10-CM

## 2024-05-24 RX ORDER — TRIAMCINOLONE ACETONIDE 0.25 MG/G
1 CREAM TOPICAL 2 TIMES DAILY PRN
Qty: 80 G | Refills: 1 | Status: SHIPPED | OUTPATIENT
Start: 2024-05-24

## 2024-05-24 ASSESSMENT — ENCOUNTER SYMPTOMS
ABDOMINAL PAIN: 0
ABDOMINAL DISTENTION: 0

## 2024-05-24 NOTE — PROGRESS NOTES
1. \"Have you been to the ER, urgent care clinic since your last visit?  Hospitalized since your last visit?\" no    2. \"Have you seen or consulted any other [facility] care providers [facility]?\" no         Health Maintenance Due   Topic Date Due    DTaP/Tdap/Td vaccine (1 - Tdap) Never done    Shingles vaccine (1 of 2) Never done    DEXA (modify frequency per FRAX score)  Never done    Respiratory Syncytial Virus (RSV) Pregnant or age 60 yrs+ (1 - 1-dose 60+ series) Never done    Diabetic retinal exam  11/12/2014    COVID-19 Vaccine (3 - 2023-24 season) 09/01/2023      
insufficiency  - triamcinolone (KENALOG) 0.025 % cream; Apply 1 g topically 2 times daily as needed (Itching) Apply Topically to legs  Dispense: 80 g; Refill: 1    2. Diabetes mellitus type 2, diet-controlled (HCC)    3. Primary hypertension       Follow-up and Dispositions    Return in about 3 months (around 8/24/2024).           I have discussed the diagnosis with the patient and the intended plan as seen in the above orders.  Social history, medical history, and labs were reviewed.  The patient has received an after-visit summary and questions were answered concerning future plans.  I have discussed medication side effects and warnings with the patient as well.    Boo Winston MD  [facility]  05/24/24

## 2024-09-09 DIAGNOSIS — I10 PRIMARY HYPERTENSION: ICD-10-CM

## 2024-09-09 RX ORDER — LISINOPRIL 40 MG/1
TABLET ORAL
Qty: 90 TABLET | Refills: 0 | Status: SHIPPED | OUTPATIENT
Start: 2024-09-09

## 2024-10-25 ENCOUNTER — IMMUNIZATION (OUTPATIENT)
Facility: CLINIC | Age: 79
End: 2024-10-25
Payer: MEDICARE

## 2024-10-25 VITALS
HEART RATE: 80 BPM | OXYGEN SATURATION: 96 % | TEMPERATURE: 98.3 F | DIASTOLIC BLOOD PRESSURE: 76 MMHG | SYSTOLIC BLOOD PRESSURE: 167 MMHG | RESPIRATION RATE: 20 BRPM

## 2024-10-25 DIAGNOSIS — Z23 ENCOUNTER FOR IMMUNIZATION: Primary | ICD-10-CM

## 2024-10-25 PROCEDURE — 90653 IIV ADJUVANT VACCINE IM: CPT | Performed by: FAMILY MEDICINE

## 2024-10-25 PROCEDURE — G0008 ADMIN INFLUENZA VIRUS VAC: HCPCS | Performed by: FAMILY MEDICINE

## 2024-10-25 SDOH — ECONOMIC STABILITY: FOOD INSECURITY: WITHIN THE PAST 12 MONTHS, YOU WORRIED THAT YOUR FOOD WOULD RUN OUT BEFORE YOU GOT MONEY TO BUY MORE.: NEVER TRUE

## 2024-10-25 SDOH — ECONOMIC STABILITY: FOOD INSECURITY: WITHIN THE PAST 12 MONTHS, THE FOOD YOU BOUGHT JUST DIDN'T LAST AND YOU DIDN'T HAVE MONEY TO GET MORE.: NEVER TRUE

## 2024-10-25 SDOH — ECONOMIC STABILITY: INCOME INSECURITY: HOW HARD IS IT FOR YOU TO PAY FOR THE VERY BASICS LIKE FOOD, HOUSING, MEDICAL CARE, AND HEATING?: NOT HARD AT ALL

## 2024-10-25 NOTE — PROGRESS NOTES
Patient here for flu shot only. Denies any recent illness. Tolerated well.   Patient instructed to schedule routine check up with PCP.

## 2024-11-01 DIAGNOSIS — J45.20 MILD INTERMITTENT ASTHMA WITHOUT COMPLICATION: ICD-10-CM

## 2024-11-01 RX ORDER — ALBUTEROL SULFATE 90 UG/1
INHALANT RESPIRATORY (INHALATION)
Qty: 18 G | Refills: 1 | Status: SHIPPED | OUTPATIENT
Start: 2024-11-01

## 2024-11-27 ENCOUNTER — OFFICE VISIT (OUTPATIENT)
Facility: CLINIC | Age: 79
End: 2024-11-27
Payer: MEDICARE

## 2024-11-27 VITALS
DIASTOLIC BLOOD PRESSURE: 67 MMHG | HEART RATE: 78 BPM | BODY MASS INDEX: 37.73 KG/M2 | SYSTOLIC BLOOD PRESSURE: 135 MMHG | WEIGHT: 205 LBS | TEMPERATURE: 98.1 F | OXYGEN SATURATION: 98 % | HEIGHT: 62 IN | RESPIRATION RATE: 20 BRPM

## 2024-11-27 DIAGNOSIS — E11.21 TYPE 2 DIABETES WITH NEPHROPATHY (HCC): ICD-10-CM

## 2024-11-27 DIAGNOSIS — G24.01 TARDIVE DYSKINESIA: ICD-10-CM

## 2024-11-27 DIAGNOSIS — E55.9 VITAMIN D DEFICIENCY, UNSPECIFIED: ICD-10-CM

## 2024-11-27 DIAGNOSIS — I10 PRIMARY HYPERTENSION: ICD-10-CM

## 2024-11-27 DIAGNOSIS — I87.2 VENOUS INSUFFICIENCY: ICD-10-CM

## 2024-11-27 DIAGNOSIS — E78.2 MIXED HYPERLIPIDEMIA: ICD-10-CM

## 2024-11-27 DIAGNOSIS — E53.8 B12 DEFICIENCY: ICD-10-CM

## 2024-11-27 DIAGNOSIS — M17.12 PRIMARY OSTEOARTHRITIS OF LEFT KNEE: ICD-10-CM

## 2024-11-27 DIAGNOSIS — E11.9 DIABETES MELLITUS TYPE 2, DIET-CONTROLLED (HCC): Primary | ICD-10-CM

## 2024-11-27 DIAGNOSIS — E66.01 SEVERE OBESITY (BMI 35.0-39.9) WITH COMORBIDITY: ICD-10-CM

## 2024-11-27 DIAGNOSIS — E11.22 TYPE 2 DIABETES MELLITUS WITH CHRONIC KIDNEY DISEASE, WITHOUT LONG-TERM CURRENT USE OF INSULIN, UNSPECIFIED CKD STAGE (HCC): ICD-10-CM

## 2024-11-27 DIAGNOSIS — F20.9 SCHIZOPHRENIA, UNSPECIFIED TYPE (HCC): ICD-10-CM

## 2024-11-27 PROCEDURE — 1123F ACP DISCUSS/DSCN MKR DOCD: CPT | Performed by: FAMILY MEDICINE

## 2024-11-27 PROCEDURE — 3080F DIAST BP >= 90 MM HG: CPT | Performed by: FAMILY MEDICINE

## 2024-11-27 PROCEDURE — 99214 OFFICE O/P EST MOD 30 MIN: CPT | Performed by: FAMILY MEDICINE

## 2024-11-27 PROCEDURE — 3075F SYST BP GE 130 - 139MM HG: CPT | Performed by: FAMILY MEDICINE

## 2024-11-27 PROCEDURE — 3044F HG A1C LEVEL LT 7.0%: CPT | Performed by: FAMILY MEDICINE

## 2024-11-27 RX ORDER — LISINOPRIL 40 MG/1
40 TABLET ORAL DAILY
Qty: 90 TABLET | Refills: 1 | Status: SHIPPED | OUTPATIENT
Start: 2024-11-27

## 2024-11-27 RX ORDER — LURASIDONE HYDROCHLORIDE 40 MG/1
TABLET, FILM COATED ORAL
COMMUNITY
Start: 2024-11-18

## 2024-11-27 ASSESSMENT — ENCOUNTER SYMPTOMS
ABDOMINAL PAIN: 0
CHEST TIGHTNESS: 0
SINUS PRESSURE: 0
APNEA: 0
SINUS PAIN: 0

## 2024-11-27 NOTE — PROGRESS NOTES
\"Have you been to the ER, urgent care clinic since your last visit?  Hospitalized since your last visit?\"    NO    “Have you seen or consulted any other health care providers outside of [facility] since your last visit?”    NO            Click Here for Release of Records Request     Health Maintenance Due   Topic Date Due    DTaP/Tdap/Td vaccine (1 - Tdap) Never done    DEXA (modify frequency per FRAX score)  Never done    Diabetic retinal exam  11/12/2014    Respiratory Syncytial Virus (RSV) Pregnant or age 60 yrs+ (1 - 1-dose 75+ series) Never done    A1C test (Diabetic or Prediabetic)  07/10/2024    Diabetic foot exam  08/31/2024    COVID-19 Vaccine (3 - 2023-24 season) 09/01/2024

## 2024-11-27 NOTE — PROGRESS NOTES
[facility]    History of Present Illness:   Rody Martins is a 79 y.o. female with history of HTN, DM, HLD, OA, Schizophrenia    CC: Follow up  History provided by patient and Records    HPI:  Schizophrenia: Patient notes she does hear voices still, and notes that it is typically worse when she is home alone.  Is seeing [facility] which she likes and feels she is doing overall well.  Does not follow  voices.    Allergic Rhinitis: Mildly congested in the morning with the cooler weather.    Hypertension Follow up:  The patient reports:  taking medications as instructed, no medication side effects noted, no TIA's, no chest pain on exertion, no dyspnea on exertion, no swelling of ankles, no orthostatic dizziness or lightheadedness, no orthopnea or paroxysmal nocturnal dyspnea.     BP Readings from Last 3 Encounters:   11/27/24 (!) 135/97   10/25/24 (!) 167/76   05/24/24 (!) 164/77      Diabetes Follow up: Overall the patient feels well with good energy level.     Current Medications: This patient does not have an active medication from one of the medication groupers..   Insulin dependence: No              Frequency of home glucose testing: PRN              Blood Sugar range at home: <200                Last eye exam: In past 12 months.              Last foot exam: This year.              Polyuria, polyphagia or polydipsia: No              Retinopathy: No              Neuropathy SX: No              Low blood sugar symptoms: No              Dietary compliance: compliant most of the time              Medication compliance: compliant most of the time              On ASA: No              Tobacco Use: No              Depression: No     Wt Readings from Last 3 Encounters:   11/27/24 93 kg (205 lb)   05/24/24 93 kg (205 lb)   04/29/24 92.5 kg (204 lb)        Hemoglobin A1C   Date Value Ref Range Status   01/10/2024 5.7 (H) 4.0 - 5.6 % Final     Comment:     (NOTE)  HbA1C Interpretive Ranges  <5.7

## 2024-11-28 LAB
25(OH)D3 SERPL-MCNC: 28.2 NG/ML (ref 30–100)
ALBUMIN SERPL-MCNC: 3.9 G/DL (ref 3.5–5)
ALBUMIN/GLOB SERPL: 1.1 (ref 1.1–2.2)
ALP SERPL-CCNC: 53 U/L (ref 45–117)
ALT SERPL-CCNC: 17 U/L (ref 12–78)
ANION GAP SERPL CALC-SCNC: 6 MMOL/L (ref 2–12)
AST SERPL-CCNC: 15 U/L (ref 15–37)
BILIRUB SERPL-MCNC: 0.6 MG/DL (ref 0.2–1)
BUN SERPL-MCNC: 17 MG/DL (ref 6–20)
BUN/CREAT SERPL: 20 (ref 12–20)
CALCIUM SERPL-MCNC: 9.6 MG/DL (ref 8.5–10.1)
CHLORIDE SERPL-SCNC: 109 MMOL/L (ref 97–108)
CHOLEST SERPL-MCNC: 186 MG/DL
CO2 SERPL-SCNC: 27 MMOL/L (ref 21–32)
CREAT SERPL-MCNC: 0.87 MG/DL (ref 0.55–1.02)
CREAT UR-MCNC: 28.6 MG/DL
GLOBULIN SER CALC-MCNC: 3.5 G/DL (ref 2–4)
GLUCOSE SERPL-MCNC: 113 MG/DL (ref 65–100)
HDLC SERPL-MCNC: 69 MG/DL
HDLC SERPL: 2.7 (ref 0–5)
LDLC SERPL CALC-MCNC: 98.2 MG/DL (ref 0–100)
MICROALBUMIN UR-MCNC: 1.76 MG/DL
MICROALBUMIN/CREAT UR-RTO: 62 MG/G (ref 0–30)
POTASSIUM SERPL-SCNC: 4.1 MMOL/L (ref 3.5–5.1)
PROT SERPL-MCNC: 7.4 G/DL (ref 6.4–8.2)
SODIUM SERPL-SCNC: 142 MMOL/L (ref 136–145)
SPECIMEN HOLD: NORMAL
TRIGL SERPL-MCNC: 94 MG/DL
VIT B12 SERPL-MCNC: 1742 PG/ML (ref 193–986)
VLDLC SERPL CALC-MCNC: 18.8 MG/DL

## 2024-12-01 DIAGNOSIS — E11.9 DIABETES MELLITUS TYPE 2, DIET-CONTROLLED (HCC): Primary | ICD-10-CM

## 2024-12-02 ENCOUNTER — TELEPHONE (OUTPATIENT)
Facility: CLINIC | Age: 79
End: 2024-12-02

## 2024-12-02 DIAGNOSIS — E11.9 DIABETES MELLITUS TYPE 2, DIET-CONTROLLED (HCC): ICD-10-CM

## 2024-12-02 NOTE — TELEPHONE ENCOUNTER
Pt returned call advised,  Labs have been reordered. She may come in during lab hours to have these obtained.     Pt verbalized understanding.

## 2024-12-02 NOTE — TELEPHONE ENCOUNTER
Patient called, no answer. Message left for return call.    Labs have been reordered. She may come in during lab hours to have these obtained.

## 2024-12-05 DIAGNOSIS — E11.9 DIABETES MELLITUS TYPE 2, DIET-CONTROLLED (HCC): ICD-10-CM

## 2024-12-05 DIAGNOSIS — E11.9 DIABETES MELLITUS TYPE 2, DIET-CONTROLLED (HCC): Primary | ICD-10-CM

## 2024-12-06 LAB
ERYTHROCYTE [DISTWIDTH] IN BLOOD BY AUTOMATED COUNT: 14.9 % (ref 11.5–14.5)
EST. AVERAGE GLUCOSE BLD GHB EST-MCNC: 114 MG/DL
HBA1C MFR BLD: 5.6 % (ref 4–5.6)
HCT VFR BLD AUTO: 33.6 % (ref 35–47)
HGB BLD-MCNC: 10.1 G/DL (ref 11.5–16)
MCH RBC QN AUTO: 30 PG (ref 26–34)
MCHC RBC AUTO-ENTMCNC: 30.1 G/DL (ref 30–36.5)
MCV RBC AUTO: 99.7 FL (ref 80–99)
NRBC # BLD: 0 K/UL (ref 0–0.01)
NRBC BLD-RTO: 0 PER 100 WBC
PLATELET # BLD AUTO: 274 K/UL (ref 150–400)
PMV BLD AUTO: 9.9 FL (ref 8.9–12.9)
RBC # BLD AUTO: 3.37 M/UL (ref 3.8–5.2)
WBC # BLD AUTO: 6.4 K/UL (ref 3.6–11)

## 2024-12-27 ENCOUNTER — OFFICE VISIT (OUTPATIENT)
Facility: CLINIC | Age: 79
End: 2024-12-27

## 2024-12-27 VITALS
SYSTOLIC BLOOD PRESSURE: 111 MMHG | RESPIRATION RATE: 17 BRPM | TEMPERATURE: 99.1 F | WEIGHT: 204 LBS | OXYGEN SATURATION: 95 % | DIASTOLIC BLOOD PRESSURE: 49 MMHG | HEIGHT: 62 IN | HEART RATE: 95 BPM | BODY MASS INDEX: 37.54 KG/M2

## 2024-12-27 DIAGNOSIS — I10 PRIMARY HYPERTENSION: ICD-10-CM

## 2024-12-27 DIAGNOSIS — J40 BRONCHITIS: ICD-10-CM

## 2024-12-27 DIAGNOSIS — J18.9 COMMUNITY ACQUIRED PNEUMONIA, UNSPECIFIED LATERALITY: Primary | ICD-10-CM

## 2024-12-27 DIAGNOSIS — E11.9 DIABETES MELLITUS TYPE 2, DIET-CONTROLLED (HCC): ICD-10-CM

## 2024-12-27 RX ORDER — GUAIFENESIN 600 MG/1
600 TABLET, EXTENDED RELEASE ORAL 2 TIMES DAILY
Qty: 30 TABLET | Refills: 0 | Status: SHIPPED | OUTPATIENT
Start: 2024-12-27 | End: 2025-01-11

## 2024-12-27 RX ORDER — PREDNISONE 20 MG/1
20 TABLET ORAL DAILY
Qty: 5 TABLET | Refills: 0 | Status: SHIPPED | OUTPATIENT
Start: 2024-12-27 | End: 2025-01-01

## 2024-12-27 RX ORDER — BLOOD-GLUCOSE METER
KIT MISCELLANEOUS
Qty: 1 KIT | Refills: 0 | Status: SHIPPED | OUTPATIENT
Start: 2024-12-27

## 2024-12-27 ASSESSMENT — ENCOUNTER SYMPTOMS
COUGH: 1
WHEEZING: 1

## 2025-01-02 DIAGNOSIS — J45.20 MILD INTERMITTENT ASTHMA WITHOUT COMPLICATION: ICD-10-CM

## 2025-01-02 RX ORDER — ALBUTEROL SULFATE 90 UG/1
INHALANT RESPIRATORY (INHALATION)
Qty: 8.5 G | Refills: 1 | Status: SHIPPED | OUTPATIENT
Start: 2025-01-02

## 2025-01-15 ENCOUNTER — TELEPHONE (OUTPATIENT)
Facility: CLINIC | Age: 80
End: 2025-01-15

## 2025-02-27 ENCOUNTER — OFFICE VISIT (OUTPATIENT)
Facility: CLINIC | Age: 80
End: 2025-02-27

## 2025-02-27 VITALS
BODY MASS INDEX: 37.17 KG/M2 | HEART RATE: 79 BPM | TEMPERATURE: 97.7 F | DIASTOLIC BLOOD PRESSURE: 74 MMHG | OXYGEN SATURATION: 98 % | HEIGHT: 62 IN | RESPIRATION RATE: 18 BRPM | SYSTOLIC BLOOD PRESSURE: 139 MMHG | WEIGHT: 202 LBS

## 2025-02-27 DIAGNOSIS — G24.01 TARDIVE DYSKINESIA: ICD-10-CM

## 2025-02-27 DIAGNOSIS — E55.9 VITAMIN D DEFICIENCY, UNSPECIFIED: ICD-10-CM

## 2025-02-27 DIAGNOSIS — Z00.00 MEDICARE ANNUAL WELLNESS VISIT, SUBSEQUENT: Primary | ICD-10-CM

## 2025-02-27 DIAGNOSIS — E78.2 MIXED HYPERLIPIDEMIA: ICD-10-CM

## 2025-02-27 DIAGNOSIS — E11.9 DIABETES MELLITUS TYPE 2, DIET-CONTROLLED (HCC): ICD-10-CM

## 2025-02-27 DIAGNOSIS — K59.04 CHRONIC IDIOPATHIC CONSTIPATION: ICD-10-CM

## 2025-02-27 DIAGNOSIS — I10 PRIMARY HYPERTENSION: ICD-10-CM

## 2025-02-27 DIAGNOSIS — F20.9 SCHIZOPHRENIA, UNSPECIFIED TYPE (HCC): ICD-10-CM

## 2025-02-27 DIAGNOSIS — M17.12 PRIMARY OSTEOARTHRITIS OF LEFT KNEE: ICD-10-CM

## 2025-02-27 DIAGNOSIS — E53.8 B12 DEFICIENCY: ICD-10-CM

## 2025-02-27 RX ORDER — LURASIDONE HYDROCHLORIDE 60 MG/1
60 TABLET, FILM COATED ORAL
COMMUNITY

## 2025-02-27 RX ORDER — POLYETHYLENE GLYCOL 3350 17 G/17G
17 POWDER, FOR SOLUTION ORAL DAILY PRN
Qty: 30 EACH | Refills: 1
Start: 2025-02-27

## 2025-02-27 SDOH — ECONOMIC STABILITY: FOOD INSECURITY: WITHIN THE PAST 12 MONTHS, THE FOOD YOU BOUGHT JUST DIDN'T LAST AND YOU DIDN'T HAVE MONEY TO GET MORE.: NEVER TRUE

## 2025-02-27 SDOH — ECONOMIC STABILITY: FOOD INSECURITY: WITHIN THE PAST 12 MONTHS, YOU WORRIED THAT YOUR FOOD WOULD RUN OUT BEFORE YOU GOT MONEY TO BUY MORE.: NEVER TRUE

## 2025-02-27 ASSESSMENT — PATIENT HEALTH QUESTIONNAIRE - PHQ9
2. FEELING DOWN, DEPRESSED OR HOPELESS: NOT AT ALL
SUM OF ALL RESPONSES TO PHQ QUESTIONS 1-9: 0
SUM OF ALL RESPONSES TO PHQ9 QUESTIONS 1 & 2: 0
SUM OF ALL RESPONSES TO PHQ QUESTIONS 1-9: 0
1. LITTLE INTEREST OR PLEASURE IN DOING THINGS: NOT AT ALL

## 2025-02-27 ASSESSMENT — LIFESTYLE VARIABLES
HOW MANY STANDARD DRINKS CONTAINING ALCOHOL DO YOU HAVE ON A TYPICAL DAY: PATIENT DOES NOT DRINK
HOW OFTEN DO YOU HAVE A DRINK CONTAINING ALCOHOL: NEVER

## 2025-02-27 ASSESSMENT — ANXIETY QUESTIONNAIRES
1. FEELING NERVOUS, ANXIOUS, OR ON EDGE: NOT AT ALL
6. BECOMING EASILY ANNOYED OR IRRITABLE: NOT AT ALL
2. NOT BEING ABLE TO STOP OR CONTROL WORRYING: NOT AT ALL
4. TROUBLE RELAXING: NOT AT ALL
3. WORRYING TOO MUCH ABOUT DIFFERENT THINGS: NOT AT ALL
IF YOU CHECKED OFF ANY PROBLEMS ON THIS QUESTIONNAIRE, HOW DIFFICULT HAVE THESE PROBLEMS MADE IT FOR YOU TO DO YOUR WORK, TAKE CARE OF THINGS AT HOME, OR GET ALONG WITH OTHER PEOPLE: NOT DIFFICULT AT ALL
5. BEING SO RESTLESS THAT IT IS HARD TO SIT STILL: NOT AT ALL
7. FEELING AFRAID AS IF SOMETHING AWFUL MIGHT HAPPEN: NOT AT ALL
GAD7 TOTAL SCORE: 0

## 2025-02-27 ASSESSMENT — ENCOUNTER SYMPTOMS
APNEA: 0
ABDOMINAL PAIN: 0
ABDOMINAL DISTENTION: 0
CHEST TIGHTNESS: 0

## 2025-02-27 NOTE — PROGRESS NOTES
[facility]    History of Present Illness:   Rody Martins is a 79 y.o. female with history of HTN, DM, HLD, OA, Schizophrenia    CC: Medicare Wellness/Follow up  History provided by patient and Records    HPI:    Allergic Rhinitis: Overall sable in the day.  In the mronign has some buildup.    Schizophrenia: [facility] is managing.  Seeing Olanzapine, Latuda 60 mg, Remeron 15 mg.    Hypertension Follow up:  The patient reports:  taking medications as instructed, no medication side effects noted, no TIA's, no chest pain on exertion, no dyspnea on exertion, no swelling of ankles, no orthostatic dizziness or lightheadedness, no orthopnea or paroxysmal nocturnal dyspnea.     BP Readings from Last 3 Encounters:   02/27/25 (!) 148/75   12/27/24 (!) 111/49   11/27/24 135/67      Diabetes Follow up: Overall the patient feels well with good energy level.                Current Medications: This patient does not have an active medication from one of the medication groupers..   Insulin dependence: No              Frequency of home glucose testing: PRN              Blood Sugar range at home: <200                Last eye exam: In past 12 months.              Last foot exam: This year.              Polyuria, polyphagia or polydipsia: No              Retinopathy: No              Neuropathy SX: No              Low blood sugar symptoms: No              Dietary compliance: compliant most of the time              Medication compliance: compliant most of the time              On ASA: No              Tobacco Use: No              Depression: No     Wt Readings from Last 3 Encounters:   02/27/25 91.6 kg (202 lb)   12/27/24 92.5 kg (204 lb)   11/27/24 93 kg (205 lb)        Hemoglobin A1C   Date Value Ref Range Status   12/05/2024 5.6 4.0 - 5.6 % Final     Comment:     (NOTE)  HbA1C Interpretive Ranges  <5.7              Normal  5.7 - 6.4         Consider Prediabetes  >6.5              Consider Diabetes          Lab

## 2025-02-27 NOTE — PROGRESS NOTES
\"Have you been to the ER, urgent care clinic since your last visit?  Hospitalized since your last visit?\"    NO    “Have you seen or consulted any other health care providers outside of [facility] since your last visit?”    NO            Click Here for Release of Records Request

## 2025-02-27 NOTE — PATIENT INSTRUCTIONS
Pearl Sweeney for eyes       Learning About Dental Care for Older Adults  Dental care for older adults: Overview  Dental care for older people is much the same as for younger adults. But older adults do have concerns that younger adults do not. Older adults may have problems with gum disease and decay on the roots of their teeth. They may need missing teeth replaced or broken fillings fixed. Or they may have dentures that need to be cared for. Some older adults may have trouble holding a toothbrush.  You can help remind the person you are caring for to brush and floss their teeth or to clean their dentures. In some cases, you may need to do the brushing and other dental care tasks. People who have trouble using their hands or who have dementia may need this extra help.  How can you help with dental care?  Normal dental care  To keep the teeth and gums healthy:  Brush the teeth with fluoride toothpaste twice a day--in the morning and at night--and floss at least once a day. Plaque can quickly build up on the teeth of older adults.  Watch for the signs of gum disease. These signs include gums that bleed after brushing or after eating hard foods, such as apples.  See a dentist regularly. Many experts recommend checkups every 6 months.  Keep the dentist up to date on any new medications the person is taking.  Encourage a balanced diet that includes whole grains, vegetables, and fruits, and that is low in saturated fat and sodium.  Encourage the person you're caring for not to use tobacco products. They can affect dental and general health.  Many older adults have a fixed income and feel that they can't afford dental care. But most [facility] and [facility] have programs in which dentists help older adults by lowering fees. Contact your area's public health offices or  for information about dental care in your area.  Using a toothbrush  Older adults with arthritis sometimes have trouble brushing their teeth because

## 2025-02-27 NOTE — PROGRESS NOTES
Medicare Annual Wellness Visit    Rody Martins is here for Medicare AWV    Assessment & Plan   Medicare annual wellness visit, subsequent  Chronic idiopathic constipation  -     polyethylene glycol (HEALTHYLAX) 17 g packet; Take 1 packet by mouth daily as needed for Constipation, Disp-30 each, R-1This prescription was filled on 6/23/2023. Any refills authorized will be placed on file.NO PRINT  Primary hypertension  -     Comprehensive Metabolic Panel; Future  -     CBC; Future  Diabetes mellitus type 2, diet-controlled (HCC)  -     Hemoglobin A1C; Future  Tardive dyskinesia  Primary osteoarthritis of left knee  B12 deficiency  -     Vitamin B12; Future  Schizophrenia, unspecified type (HCC)  Vitamin D deficiency, unspecified  -     Vitamin D 25 Hydroxy; Future  Mixed hyperlipidemia  -     Lipid Panel; Future       Return in about 3 months (around 5/27/2025).     Subjective     Patient's complete Health Risk Assessment and screening values have been reviewed and are found in Flowsheets. The following problems were reviewed today and where indicated follow up appointments were made and/or referrals ordered.    Positive Risk Factor Screenings with Interventions:                Abnormal BMI (obese):  Body mass index is 36.95 kg/m². (!) Abnormal  Interventions:  low carbohydrate diet      Dentist Screen:  Have you seen the dentist within the past year?: (!) No    Intervention:  Advised to schedule with their dentist     Vision Screen:  Do you have difficulty driving, watching TV, or doing any of your daily activities because of your eyesight?: (!) Yes  Have you had an eye exam within the past year?: (!) No  Interventions:   Patient encouraged to make appointment with their eye specialist      Advanced Directives:  Do you have a Living Will?: (!) No    Intervention:  has NO advanced directive - information provided        Objective   Vitals:    02/27/25 0916   BP: (!) 148/75   Site: Right Upper Arm   Position: Sitting

## 2025-03-01 LAB
25(OH)D3 SERPL-MCNC: 30.7 NG/ML (ref 30–100)
ALBUMIN SERPL-MCNC: 4.1 G/DL (ref 3.5–5)
ALBUMIN/GLOB SERPL: 1.2 (ref 1.1–2.2)
ALP SERPL-CCNC: 59 U/L (ref 45–117)
ALT SERPL-CCNC: 17 U/L (ref 12–78)
ANION GAP SERPL CALC-SCNC: 9 MMOL/L (ref 2–12)
AST SERPL-CCNC: 19 U/L (ref 15–37)
BILIRUB SERPL-MCNC: 0.3 MG/DL (ref 0.2–1)
BUN SERPL-MCNC: 13 MG/DL (ref 6–20)
BUN/CREAT SERPL: 14 (ref 12–20)
CALCIUM SERPL-MCNC: 9.5 MG/DL (ref 8.5–10.1)
CHLORIDE SERPL-SCNC: 110 MMOL/L (ref 97–108)
CHOLEST SERPL-MCNC: 183 MG/DL
CO2 SERPL-SCNC: 22 MMOL/L (ref 21–32)
CREAT SERPL-MCNC: 0.93 MG/DL (ref 0.55–1.02)
ERYTHROCYTE [DISTWIDTH] IN BLOOD BY AUTOMATED COUNT: 15.7 % (ref 11.5–14.5)
EST. AVERAGE GLUCOSE BLD GHB EST-MCNC: 120 MG/DL
GLOBULIN SER CALC-MCNC: 3.3 G/DL (ref 2–4)
GLUCOSE SERPL-MCNC: 101 MG/DL (ref 65–100)
HBA1C MFR BLD: 5.8 % (ref 4–5.6)
HCT VFR BLD AUTO: 32.5 % (ref 35–47)
HDLC SERPL-MCNC: 62 MG/DL
HDLC SERPL: 3 (ref 0–5)
HGB BLD-MCNC: 9.8 G/DL (ref 11.5–16)
LDLC SERPL CALC-MCNC: 104.2 MG/DL (ref 0–100)
MCH RBC QN AUTO: 29.5 PG (ref 26–34)
MCHC RBC AUTO-ENTMCNC: 30.2 G/DL (ref 30–36.5)
MCV RBC AUTO: 97.9 FL (ref 80–99)
NRBC # BLD: 0 K/UL (ref 0–0.01)
NRBC BLD-RTO: 0 PER 100 WBC
PLATELET # BLD AUTO: 296 K/UL (ref 150–400)
PMV BLD AUTO: 10 FL (ref 8.9–12.9)
POTASSIUM SERPL-SCNC: 4.3 MMOL/L (ref 3.5–5.1)
PROT SERPL-MCNC: 7.4 G/DL (ref 6.4–8.2)
RBC # BLD AUTO: 3.32 M/UL (ref 3.8–5.2)
SODIUM SERPL-SCNC: 141 MMOL/L (ref 136–145)
TRIGL SERPL-MCNC: 84 MG/DL
VIT B12 SERPL-MCNC: >2000 PG/ML (ref 193–986)
VLDLC SERPL CALC-MCNC: 16.8 MG/DL
WBC # BLD AUTO: 6.2 K/UL (ref 3.6–11)

## 2025-05-30 DIAGNOSIS — I10 PRIMARY HYPERTENSION: ICD-10-CM

## 2025-05-30 RX ORDER — LISINOPRIL 40 MG/1
40 TABLET ORAL DAILY
Qty: 90 TABLET | Refills: 1 | Status: SHIPPED | OUTPATIENT
Start: 2025-05-30

## 2025-06-11 ENCOUNTER — OFFICE VISIT (OUTPATIENT)
Facility: CLINIC | Age: 80
End: 2025-06-11
Payer: MEDICARE

## 2025-06-11 VITALS
WEIGHT: 195.4 LBS | BODY MASS INDEX: 35.96 KG/M2 | HEIGHT: 62 IN | SYSTOLIC BLOOD PRESSURE: 103 MMHG | RESPIRATION RATE: 18 BRPM | OXYGEN SATURATION: 98 % | TEMPERATURE: 98.2 F | DIASTOLIC BLOOD PRESSURE: 60 MMHG | HEART RATE: 73 BPM

## 2025-06-11 DIAGNOSIS — I10 PRIMARY HYPERTENSION: Primary | ICD-10-CM

## 2025-06-11 DIAGNOSIS — E11.22 TYPE 2 DIABETES MELLITUS WITH CHRONIC KIDNEY DISEASE, WITHOUT LONG-TERM CURRENT USE OF INSULIN, UNSPECIFIED CKD STAGE (HCC): ICD-10-CM

## 2025-06-11 DIAGNOSIS — E66.01 SEVERE OBESITY (BMI 35.0-39.9) WITH COMORBIDITY (HCC): ICD-10-CM

## 2025-06-11 DIAGNOSIS — M17.12 PRIMARY OSTEOARTHRITIS OF LEFT KNEE: ICD-10-CM

## 2025-06-11 DIAGNOSIS — F20.9 SCHIZOPHRENIA, UNSPECIFIED TYPE (HCC): ICD-10-CM

## 2025-06-11 DIAGNOSIS — E11.9 DIABETES MELLITUS TYPE 2, DIET-CONTROLLED (HCC): ICD-10-CM

## 2025-06-11 DIAGNOSIS — E53.8 B12 DEFICIENCY: ICD-10-CM

## 2025-06-11 DIAGNOSIS — E78.2 MIXED HYPERLIPIDEMIA: ICD-10-CM

## 2025-06-11 DIAGNOSIS — M79.89 LEG SWELLING: ICD-10-CM

## 2025-06-11 PROCEDURE — G2211 COMPLEX E/M VISIT ADD ON: HCPCS | Performed by: FAMILY MEDICINE

## 2025-06-11 PROCEDURE — 99214 OFFICE O/P EST MOD 30 MIN: CPT | Performed by: FAMILY MEDICINE

## 2025-06-11 PROCEDURE — 1123F ACP DISCUSS/DSCN MKR DOCD: CPT | Performed by: FAMILY MEDICINE

## 2025-06-11 PROCEDURE — 1160F RVW MEDS BY RX/DR IN RCRD: CPT | Performed by: FAMILY MEDICINE

## 2025-06-11 PROCEDURE — 1126F AMNT PAIN NOTED NONE PRSNT: CPT | Performed by: FAMILY MEDICINE

## 2025-06-11 PROCEDURE — 1159F MED LIST DOCD IN RCRD: CPT | Performed by: FAMILY MEDICINE

## 2025-06-11 PROCEDURE — 3044F HG A1C LEVEL LT 7.0%: CPT | Performed by: FAMILY MEDICINE

## 2025-06-11 PROCEDURE — 3077F SYST BP >= 140 MM HG: CPT | Performed by: FAMILY MEDICINE

## 2025-06-11 PROCEDURE — 3078F DIAST BP <80 MM HG: CPT | Performed by: FAMILY MEDICINE

## 2025-06-11 ASSESSMENT — ENCOUNTER SYMPTOMS
COLOR CHANGE: 1
APNEA: 0
CHEST TIGHTNESS: 0

## 2025-06-11 NOTE — PROGRESS NOTES
Have you been to the ER, urgent care clinic since your last visit?  Hospitalized since your last visit?   NO    Have you seen or consulted any other health care providers outside our system since your last visit?   NO      “Have you had a diabetic eye exam?”    YES      
A1C; Future    4. Primary osteoarthritis of left knee  Stable, use Tylenol    5. B12 deficiency  - Vitamin B12; Future    6. Schizophrenia, unspecified type (HCC)  Stable, eeing specialist    7. Severe obesity (BMI 35.0-39.9) with comorbidity (HCC)  Overall stable    8. Mixed hyperlipidemia  - Lipid Panel; Future     9. Leg swelling: Likely Venous insufficeny also causing some skin changes, Discussed compression stockings.    Follow-up and Dispositions    Return in about 3 months (around 9/11/2025).           I have discussed the diagnosis with the patient and the intended plan as seen in the above orders.  Social history, medical history, and labs were reviewed.  The patient has received an after-visit summary and questions were answered concerning future plans.  I have discussed medication side effects and warnings with the patient as well.    Boo Winston MD  [facility]  06/11/25

## 2025-06-12 ENCOUNTER — RESULTS FOLLOW-UP (OUTPATIENT)
Facility: CLINIC | Age: 80
End: 2025-06-12

## 2025-06-12 LAB
ALBUMIN SERPL-MCNC: 3.8 G/DL (ref 3.5–5)
ALBUMIN/GLOB SERPL: 1.1 (ref 1.1–2.2)
ALP SERPL-CCNC: 58 U/L (ref 45–117)
ALT SERPL-CCNC: 18 U/L (ref 12–78)
ANION GAP SERPL CALC-SCNC: 6 MMOL/L (ref 2–12)
AST SERPL-CCNC: 11 U/L (ref 15–37)
BILIRUB SERPL-MCNC: 0.2 MG/DL (ref 0.2–1)
BUN SERPL-MCNC: 21 MG/DL (ref 6–20)
BUN/CREAT SERPL: 23 (ref 12–20)
CALCIUM SERPL-MCNC: 9.4 MG/DL (ref 8.5–10.1)
CHLORIDE SERPL-SCNC: 108 MMOL/L (ref 97–108)
CHOLEST SERPL-MCNC: 172 MG/DL
CO2 SERPL-SCNC: 27 MMOL/L (ref 21–32)
COMMENT:: NORMAL
CREAT SERPL-MCNC: 0.91 MG/DL (ref 0.55–1.02)
ERYTHROCYTE [DISTWIDTH] IN BLOOD BY AUTOMATED COUNT: 15.2 % (ref 11.5–14.5)
EST. AVERAGE GLUCOSE BLD GHB EST-MCNC: 131 MG/DL
GLOBULIN SER CALC-MCNC: 3.5 G/DL (ref 2–4)
GLUCOSE SERPL-MCNC: 106 MG/DL (ref 65–100)
HBA1C MFR BLD: 6.2 % (ref 4–5.6)
HCT VFR BLD AUTO: 33.2 % (ref 35–47)
HDLC SERPL-MCNC: 64 MG/DL
HDLC SERPL: 2.7 (ref 0–5)
HGB BLD-MCNC: 10 G/DL (ref 11.5–16)
LDLC SERPL CALC-MCNC: 96.2 MG/DL (ref 0–100)
MCH RBC QN AUTO: 29.9 PG (ref 26–34)
MCHC RBC AUTO-ENTMCNC: 30.1 G/DL (ref 30–36.5)
MCV RBC AUTO: 99.4 FL (ref 80–99)
NRBC # BLD: 0 K/UL (ref 0–0.01)
NRBC BLD-RTO: 0 PER 100 WBC
PLATELET # BLD AUTO: 292 K/UL (ref 150–400)
PMV BLD AUTO: 9.6 FL (ref 8.9–12.9)
POTASSIUM SERPL-SCNC: 3.9 MMOL/L (ref 3.5–5.1)
PROT SERPL-MCNC: 7.3 G/DL (ref 6.4–8.2)
RBC # BLD AUTO: 3.34 M/UL (ref 3.8–5.2)
SODIUM SERPL-SCNC: 141 MMOL/L (ref 136–145)
SPECIMEN HOLD: NORMAL
TRIGL SERPL-MCNC: 59 MG/DL
VIT B12 SERPL-MCNC: 1757 PG/ML (ref 193–986)
VLDLC SERPL CALC-MCNC: 11.8 MG/DL
WBC # BLD AUTO: 5.7 K/UL (ref 3.6–11)